# Patient Record
Sex: MALE | Race: WHITE | Employment: OTHER | URBAN - NONMETROPOLITAN AREA
[De-identification: names, ages, dates, MRNs, and addresses within clinical notes are randomized per-mention and may not be internally consistent; named-entity substitution may affect disease eponyms.]

---

## 2020-05-18 ENCOUNTER — OFFICE VISIT (OUTPATIENT)
Dept: FAMILY MEDICINE CLINIC | Age: 60
End: 2020-05-18
Payer: COMMERCIAL

## 2020-05-18 VITALS
HEIGHT: 69 IN | DIASTOLIC BLOOD PRESSURE: 64 MMHG | TEMPERATURE: 98 F | HEART RATE: 66 BPM | OXYGEN SATURATION: 98 % | WEIGHT: 238 LBS | RESPIRATION RATE: 18 BRPM | BODY MASS INDEX: 35.25 KG/M2 | SYSTOLIC BLOOD PRESSURE: 114 MMHG

## 2020-05-18 DIAGNOSIS — E61.1 IRON DEFICIENCY: ICD-10-CM

## 2020-05-18 DIAGNOSIS — K76.9 LIVER DISEASE: ICD-10-CM

## 2020-05-18 DIAGNOSIS — R00.2 PALPITATION: ICD-10-CM

## 2020-05-18 LAB
ALBUMIN SERPL-MCNC: 4.1 G/DL (ref 3.5–5.2)
ALP BLD-CCNC: 60 U/L (ref 40–130)
ALT SERPL-CCNC: 14 U/L (ref 5–41)
ANION GAP SERPL CALCULATED.3IONS-SCNC: 15 MMOL/L (ref 7–19)
APTT: 33.1 SEC (ref 26–36.2)
AST SERPL-CCNC: 21 U/L (ref 5–40)
BACTERIA: NEGATIVE /HPF
BILIRUB SERPL-MCNC: 1.3 MG/DL (ref 0.2–1.2)
BILIRUBIN URINE: NEGATIVE
BLOOD, URINE: NEGATIVE
BUN BLDV-MCNC: 10 MG/DL (ref 8–23)
CALCIUM SERPL-MCNC: 8.7 MG/DL (ref 8.8–10.2)
CHLORIDE BLD-SCNC: 103 MMOL/L (ref 98–111)
CHOLESTEROL, TOTAL: 149 MG/DL (ref 160–199)
CLARITY: CLEAR
CO2: 25 MMOL/L (ref 22–29)
COLOR: ABNORMAL
CREAT SERPL-MCNC: 0.9 MG/DL (ref 0.5–1.2)
EPITHELIAL CELLS, UA: 5 /HPF (ref 0–5)
FERRITIN: 70 NG/ML (ref 30–400)
FOLATE: >20 NG/ML (ref 4.5–32.2)
GFR NON-AFRICAN AMERICAN: >60
GLUCOSE BLD-MCNC: 94 MG/DL (ref 74–109)
GLUCOSE URINE: NEGATIVE MG/DL
HAV IGM SER IA-ACNC: NORMAL
HCT VFR BLD CALC: 41.8 % (ref 42–52)
HDLC SERPL-MCNC: 35 MG/DL (ref 55–121)
HEMOGLOBIN: 14.2 G/DL (ref 14–18)
HEPATITIS B CORE IGM ANTIBODY: NORMAL
HEPATITIS B SURFACE ANTIGEN INTERPRETATION: NORMAL
HEPATITIS C ANTIBODY INTERPRETATION: NORMAL
HYALINE CASTS: 5 /HPF (ref 0–8)
INR BLD: 1.32 (ref 0.88–1.18)
IRON SATURATION: 25 % (ref 14–50)
IRON: 78 UG/DL (ref 59–158)
KETONES, URINE: NEGATIVE MG/DL
LDL CHOLESTEROL CALCULATED: 101 MG/DL
LEUKOCYTE ESTERASE, URINE: ABNORMAL
MCH RBC QN AUTO: 33.2 PG (ref 27–31)
MCHC RBC AUTO-ENTMCNC: 34 G/DL (ref 33–37)
MCV RBC AUTO: 97.7 FL (ref 80–94)
NITRITE, URINE: NEGATIVE
PDW BLD-RTO: 13.8 % (ref 11.5–14.5)
PH UA: 7 (ref 5–8)
PLATELET # BLD: 79 K/UL (ref 130–400)
PMV BLD AUTO: 12 FL (ref 9.4–12.4)
POTASSIUM SERPL-SCNC: 4.4 MMOL/L (ref 3.5–5)
PROTEIN UA: NEGATIVE MG/DL
PROTHROMBIN TIME: 16.4 SEC (ref 12–14.6)
RBC # BLD: 4.28 M/UL (ref 4.7–6.1)
RBC UA: 1 /HPF (ref 0–4)
SODIUM BLD-SCNC: 143 MMOL/L (ref 136–145)
SPECIFIC GRAVITY UA: 1.02 (ref 1–1.03)
TOTAL IRON BINDING CAPACITY: 314 UG/DL (ref 250–400)
TOTAL PROTEIN: 6.9 G/DL (ref 6.6–8.7)
TRIGL SERPL-MCNC: 67 MG/DL (ref 0–149)
TSH SERPL DL<=0.05 MIU/L-ACNC: 2.53 UIU/ML (ref 0.27–4.2)
UROBILINOGEN, URINE: 2 E.U./DL
VITAMIN B-12: 943 PG/ML (ref 211–946)
VITAMIN D 25-HYDROXY: 40.4 NG/ML
WBC # BLD: 6.1 K/UL (ref 4.8–10.8)
WBC UA: 2 /HPF (ref 0–5)

## 2020-05-18 PROCEDURE — 99204 OFFICE O/P NEW MOD 45 MIN: CPT | Performed by: FAMILY MEDICINE

## 2020-05-18 RX ORDER — UMECLIDINIUM 62.5 UG/1
1 AEROSOL, POWDER ORAL DAILY
Status: ON HOLD | COMMUNITY
End: 2020-10-23

## 2020-05-18 RX ORDER — OXYCODONE AND ACETAMINOPHEN 10; 325 MG/1; MG/1
1 TABLET ORAL EVERY 6 HOURS PRN
COMMUNITY
End: 2020-09-23 | Stop reason: SDUPTHER

## 2020-05-18 RX ORDER — MULTIVIT WITH MINERALS/LUTEIN
1000 TABLET ORAL DAILY
COMMUNITY
End: 2021-04-29 | Stop reason: ALTCHOICE

## 2020-05-18 RX ORDER — CYANOCOBALAMIN (VITAMIN B-12) 500 MCG
450 LOZENGE ORAL DAILY
COMMUNITY
End: 2021-04-29 | Stop reason: ALTCHOICE

## 2020-05-18 RX ORDER — PNV NO.95/FERROUS FUM/FOLIC AC 28MG-0.8MG
325 TABLET ORAL DAILY
Qty: 30 TABLET | Refills: 5 | Status: SHIPPED | OUTPATIENT
Start: 2020-05-18 | End: 2021-04-29 | Stop reason: ALTCHOICE

## 2020-05-18 RX ORDER — FUROSEMIDE 20 MG/1
20 TABLET ORAL DAILY
COMMUNITY
End: 2021-02-23 | Stop reason: SDUPTHER

## 2020-05-18 RX ORDER — SPIRONOLACTONE 50 MG/1
50 TABLET, FILM COATED ORAL DAILY
COMMUNITY
End: 2021-03-16 | Stop reason: SDUPTHER

## 2020-05-18 RX ORDER — FOLIC ACID 1 MG/1
1 TABLET ORAL DAILY
COMMUNITY
End: 2020-09-16 | Stop reason: SDUPTHER

## 2020-05-18 RX ORDER — OMEPRAZOLE 40 MG/1
40 CAPSULE, DELAYED RELEASE ORAL DAILY
COMMUNITY
End: 2021-03-24 | Stop reason: SDUPTHER

## 2020-05-18 RX ORDER — VERAPAMIL HYDROCHLORIDE 120 MG/1
120 CAPSULE, EXTENDED RELEASE ORAL 2 TIMES DAILY
COMMUNITY
End: 2020-12-08 | Stop reason: ALTCHOICE

## 2020-05-18 ASSESSMENT — ENCOUNTER SYMPTOMS
EYES NEGATIVE: 1
ALLERGIC/IMMUNOLOGIC NEGATIVE: 1
RESPIRATORY NEGATIVE: 1
GASTROINTESTINAL NEGATIVE: 1
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
## 2020-05-18 NOTE — PATIENT INSTRUCTIONS
Patient Education        Liver Disease Diet: Care Instructions  Your Care Instructions    The liver does many jobs that are vital to the rest of your body. When something is wrong with the liver, your body may not get the nutrition it needs. It is important that you eat a healthy diet that includes a variety of foods from the basic food groups: grains, vegetables, fruits, dairy, and protein foods. Follow your doctor's instructions for eating carbohydrate, protein, and fat in the right amounts for you. Your doctor also may limit salt or take salt out of your diet to help protect the liver. Always talk with your doctor or dietitian before you make changes in your diet. Follow-up care is a key part of your treatment and safety. Be sure to make and go to all appointments, and call your doctor if you are having problems. It's also a good idea to know your test results and keep a list of the medicines you take. How can you care for yourself at home? · Work with your doctor or dietitian to create a food plan that guides your daily food choices. · Eat a balanced diet. Do not skip meals or go for many hours without eating. If you eat several small meals during the day, you have a better chance of getting the extra calories your body needs for energy. · Your doctor may recommend a high-carbohydrate diet to get enough calories, since you may have to limit fat. You may need to spread carbohydrate throughout your meals and snacks to control the amount of sugar in your blood. Eat six small meals a day, rather than three big ones. Carbohydrate is found in:  ? Whole-grain and refined breads and cereals. ? Some vegetables. ? Cooked beans (such as kidney or black beans) and peas (such as lentils or split peas). ? Fruits. ? Low-fat or nonfat milk and milk products, which also supply protein. ? Candy, table sugar, and sugary soda drinks (try to limit these).   · Follow your doctor's or dietitian's instructions on how to get the right amount of protein in your diet. Examples of animal protein are:  ? Meat, fish, and poultry. ? Eggs. ? Milk and milk products. · Your doctor or dietitian may ask you to eat a certain amount of protein that comes from plants (rather than protein that comes from animals). You can get plant protein from foods such as:  ? Cooked dried beans and peas. ? Peanut butter, nuts, and seeds. ? Tofu. · Limit salt, if your doctor tells you to. This will help prevent fluid buildup in your belly and chest, which can cause serious problems. Salt is in many prepared foods, such as gonzalez, canned foods, snack foods, sauces, and soups. Look for reduced-salt products. · Your doctor may recommend vitamin and mineral supplements. However, do not take any other medicine, including over-the-counter medicines, vitamins, and herbal products, without talking to your doctor first.  · Do NOT take acetaminophen (Tylenol), ibuprofen (Advil, Motrin), or naproxen (Aleve). These can cause more liver damage. · Do not drink any alcohol. It can harm your liver. Talk to your doctor if you need help to stop drinking. · If you have a loss of appetite or have nausea or vomiting, try to:  ? Stay away from foods and food smells that make you feel worse. ? Avoid greasy or fatty foods. ? Eat food that settles your stomach when it feels upset. Try crackers, dry toast, or ivan (ivan tea, hard ivan candy, or crystallized ivan). When should you call for help? Watch closely for changes in your health, and be sure to contact your doctor if you have any problems. Where can you learn more? Go to https://Well[employer] Holdingspepiceweb.[employer]. org and sign in to your [employer] account. Enter X359 in the Pinoccio box to learn more about \"Liver Disease Diet: Care Instructions. \"     If you do not have an account, please click on the \"Sign Up Now\" link.   Current as of: August 21, 2019Content Version: 12.4  © 6110-6939 Healthwise,

## 2020-05-19 ENCOUNTER — TELEPHONE (OUTPATIENT)
Dept: NEUROLOGY | Age: 60
End: 2020-05-19

## 2020-05-21 ENCOUNTER — OFFICE VISIT (OUTPATIENT)
Dept: FAMILY MEDICINE CLINIC | Age: 60
End: 2020-05-21
Payer: COMMERCIAL

## 2020-05-21 VITALS
WEIGHT: 233 LBS | OXYGEN SATURATION: 98 % | TEMPERATURE: 99 F | HEIGHT: 69 IN | BODY MASS INDEX: 34.51 KG/M2 | HEART RATE: 82 BPM | DIASTOLIC BLOOD PRESSURE: 78 MMHG | RESPIRATION RATE: 18 BRPM | SYSTOLIC BLOOD PRESSURE: 142 MMHG

## 2020-05-21 PROCEDURE — 99214 OFFICE O/P EST MOD 30 MIN: CPT | Performed by: FAMILY MEDICINE

## 2020-05-21 ASSESSMENT — ENCOUNTER SYMPTOMS
RESPIRATORY NEGATIVE: 1
EYES NEGATIVE: 1
NAUSEA: 1
ALLERGIC/IMMUNOLOGIC NEGATIVE: 1

## 2020-05-27 ENCOUNTER — TELEPHONE (OUTPATIENT)
Dept: NEUROLOGY | Age: 60
End: 2020-05-27

## 2020-05-27 NOTE — TELEPHONE ENCOUNTER
Received a referral from Dr. Paras Del Real office for this patient. Called and spoke with patient to let him know when I have him scheduled an appointment with Dr. Yolanda Baldwin. Patient is aware of the appointment time/date/location.

## 2020-05-29 ENCOUNTER — HOSPITAL ENCOUNTER (OUTPATIENT)
Dept: MRI IMAGING | Age: 60
Discharge: HOME | End: 2020-05-29
Payer: MEDICARE

## 2020-05-29 ENCOUNTER — HOSPITAL ENCOUNTER (OUTPATIENT)
Dept: ULTRASOUND IMAGING | Age: 60
Discharge: HOME | End: 2020-05-29
Payer: MEDICARE

## 2020-05-29 PROCEDURE — 6360000004 HC RX CONTRAST MEDICATION: Performed by: FAMILY MEDICINE

## 2020-05-29 PROCEDURE — 76705 ECHO EXAM OF ABDOMEN: CPT

## 2020-05-29 PROCEDURE — 70553 MRI BRAIN STEM W/O & W/DYE: CPT

## 2020-05-29 PROCEDURE — A9577 INJ MULTIHANCE: HCPCS | Performed by: FAMILY MEDICINE

## 2020-05-29 PROCEDURE — 76700 US EXAM ABDOM COMPLETE: CPT

## 2020-05-29 RX ADMIN — GADOBENATE DIMEGLUMINE 20 ML: 529 INJECTION, SOLUTION INTRAVENOUS at 18:56

## 2020-06-01 ENCOUNTER — CARE COORDINATION (OUTPATIENT)
Dept: CARE COORDINATION | Age: 60
End: 2020-06-01

## 2020-06-01 ENCOUNTER — VIRTUAL VISIT (OUTPATIENT)
Dept: FAMILY MEDICINE CLINIC | Age: 60
End: 2020-06-01
Payer: MEDICARE

## 2020-06-01 PROCEDURE — 99212 OFFICE O/P EST SF 10 MIN: CPT | Performed by: FAMILY MEDICINE

## 2020-06-04 ENCOUNTER — TELEPHONE (OUTPATIENT)
Dept: ADMINISTRATIVE | Age: 60
End: 2020-06-04

## 2020-06-15 RX ORDER — TRAZODONE HYDROCHLORIDE 100 MG/1
100 TABLET ORAL NIGHTLY
Qty: 30 TABLET | Refills: 5 | Status: SHIPPED | OUTPATIENT
Start: 2020-06-15 | End: 2020-09-23 | Stop reason: SDUPTHER

## 2020-06-25 ENCOUNTER — NURSE TRIAGE (OUTPATIENT)
Dept: OTHER | Facility: CLINIC | Age: 60
End: 2020-06-25

## 2020-06-25 ENCOUNTER — TELEPHONE (OUTPATIENT)
Dept: FAMILY MEDICINE CLINIC | Age: 60
End: 2020-06-25

## 2020-06-25 NOTE — TELEPHONE ENCOUNTER
Patient called [facility] pre-service center [facility]) to schedule appointment, with red flag complaint, transferred to RN access for triage. Reports having dizziness for \"past few days\". States present with change in position. Denies any current dizziness at the time of the call. Patient informed of disposition. Care advice as documented. Instructed patient to call back with worsening symptoms. Soft transfer to pre-service center to schedule appointment as recommended. Please do not respond to the triage nurse through this encounter. Any subsequent communication should be directly with the patient.     Ozzie    Reason for Disposition   [1] MODERATE dizziness (e.g., interferes with normal activities) AND [2] has NOT been evaluated by physician for this  (Exception: dizziness caused by heat exposure, sudden standing, or poor fluid intake)    Protocols used: [facility]

## 2020-06-26 ENCOUNTER — OFFICE VISIT (OUTPATIENT)
Dept: FAMILY MEDICINE CLINIC | Age: 60
End: 2020-06-26
Payer: MEDICARE

## 2020-06-26 VITALS
HEART RATE: 63 BPM | TEMPERATURE: 98 F | OXYGEN SATURATION: 98 % | DIASTOLIC BLOOD PRESSURE: 80 MMHG | SYSTOLIC BLOOD PRESSURE: 130 MMHG | WEIGHT: 234 LBS | BODY MASS INDEX: 34.56 KG/M2

## 2020-06-26 DIAGNOSIS — I95.2 HYPOTENSION DUE TO DRUGS: ICD-10-CM

## 2020-06-26 LAB
ALBUMIN SERPL-MCNC: 4.3 G/DL (ref 3.5–5.2)
ALP BLD-CCNC: 63 U/L (ref 40–130)
ALT SERPL-CCNC: 28 U/L (ref 5–41)
ANION GAP SERPL CALCULATED.3IONS-SCNC: 14 MMOL/L (ref 7–19)
AST SERPL-CCNC: 27 U/L (ref 5–40)
BILIRUB SERPL-MCNC: 1.8 MG/DL (ref 0.2–1.2)
BUN BLDV-MCNC: 18 MG/DL (ref 8–23)
CALCIUM SERPL-MCNC: 10.1 MG/DL (ref 8.8–10.2)
CHLORIDE BLD-SCNC: 99 MMOL/L (ref 98–111)
CO2: 23 MMOL/L (ref 22–29)
CREAT SERPL-MCNC: 1 MG/DL (ref 0.5–1.2)
GFR NON-AFRICAN AMERICAN: >60
GLUCOSE BLD-MCNC: 79 MG/DL (ref 74–109)
HCT VFR BLD CALC: 45.4 % (ref 42–52)
HEMOGLOBIN: 15.8 G/DL (ref 14–18)
MCH RBC QN AUTO: 32.8 PG (ref 27–31)
MCHC RBC AUTO-ENTMCNC: 34.8 G/DL (ref 33–37)
MCV RBC AUTO: 94.4 FL (ref 80–94)
PDW BLD-RTO: 12.9 % (ref 11.5–14.5)
PLATELET # BLD: 90 K/UL (ref 130–400)
PMV BLD AUTO: 12.9 FL (ref 9.4–12.4)
POTASSIUM SERPL-SCNC: 4.5 MMOL/L (ref 3.5–5)
RBC # BLD: 4.81 M/UL (ref 4.7–6.1)
SODIUM BLD-SCNC: 136 MMOL/L (ref 136–145)
TOTAL PROTEIN: 6.9 G/DL (ref 6.6–8.7)
WBC # BLD: 10.6 K/UL (ref 4.8–10.8)

## 2020-06-26 PROCEDURE — 99213 OFFICE O/P EST LOW 20 MIN: CPT | Performed by: FAMILY MEDICINE

## 2020-06-26 PROCEDURE — 93000 ELECTROCARDIOGRAM COMPLETE: CPT | Performed by: FAMILY MEDICINE

## 2020-06-26 PROCEDURE — 1036F TOBACCO NON-USER: CPT | Performed by: FAMILY MEDICINE

## 2020-06-26 PROCEDURE — 3017F COLORECTAL CA SCREEN DOC REV: CPT | Performed by: FAMILY MEDICINE

## 2020-06-26 PROCEDURE — G8427 DOCREV CUR MEDS BY ELIG CLIN: HCPCS | Performed by: FAMILY MEDICINE

## 2020-06-26 PROCEDURE — G8417 CALC BMI ABV UP PARAM F/U: HCPCS | Performed by: FAMILY MEDICINE

## 2020-06-26 ASSESSMENT — ENCOUNTER SYMPTOMS
ALLERGIC/IMMUNOLOGIC NEGATIVE: 1
RESPIRATORY NEGATIVE: 1
EYES NEGATIVE: 1
GASTROINTESTINAL NEGATIVE: 1

## 2020-06-26 NOTE — PROGRESS NOTES
Wt 234 lb (106.1 kg)   SpO2 98%   BMI 34.56 kg/m²      ASSESSMENT:     Diagnosis Orders   1. Hypotension due to drugs  Orthostatic blood pressure and pulse    CBC    Comprehensive Metabolic Panel    EKG 12 Lead        PLAN:    1. EKG shows normal sinus rhythm without evidence of acute ischemia. Recommend a Holter monitor but patient refused secondary to cost.  He said that he cannot afford more testing. Blood work. Orthostatic blood pressure. Encouraged fluid intake. May consider decreasing dose of spironolactone.   Stand up slowly and do not walk until feels back to normal.  Follow-up next scheduled visit

## 2020-06-29 ENCOUNTER — TELEPHONE (OUTPATIENT)
Dept: PRIMARY CARE CLINIC | Age: 60
End: 2020-06-29

## 2020-07-09 ENCOUNTER — TELEPHONE (OUTPATIENT)
Dept: FAMILY MEDICINE CLINIC | Age: 60
End: 2020-07-09

## 2020-07-09 NOTE — TELEPHONE ENCOUNTER
Pt has been going to Dr. Montse Franklin for [facility] for years. He is wanting to go to Pain Management at [facility]. Asking for a referral to them.

## 2020-07-10 ENCOUNTER — TELEPHONE (OUTPATIENT)
Dept: FAMILY MEDICINE CLINIC | Age: 60
End: 2020-07-10

## 2020-07-10 RX ORDER — TIZANIDINE 4 MG/1
TABLET ORAL
Qty: 150 TABLET | Refills: 1 | Status: SHIPPED | OUTPATIENT
Start: 2020-07-10 | End: 2020-09-16

## 2020-07-10 NOTE — TELEPHONE ENCOUNTER
Brendan Fisher is calling for a medication refill that is not listed in his chart. Last office visit: 6/26/2020  Next office visit: Visit date not found   Preferred Pharmacy: [facility]    Please call patient to clarify. Thank You.     *Patient called and asked if he could get his pain medication since he is leaving [facility] on 07/27/20 and doesn't have an appt with pain mgmt until September. Please call patient. Thanks.

## 2020-07-20 ENCOUNTER — OFFICE VISIT (OUTPATIENT)
Dept: FAMILY MEDICINE CLINIC | Age: 60
End: 2020-07-20
Payer: MEDICARE

## 2020-07-20 VITALS
OXYGEN SATURATION: 98 % | DIASTOLIC BLOOD PRESSURE: 74 MMHG | SYSTOLIC BLOOD PRESSURE: 126 MMHG | WEIGHT: 237 LBS | HEART RATE: 72 BPM | TEMPERATURE: 99 F | BODY MASS INDEX: 35 KG/M2

## 2020-07-20 PROCEDURE — 3017F COLORECTAL CA SCREEN DOC REV: CPT | Performed by: FAMILY MEDICINE

## 2020-07-20 PROCEDURE — 1036F TOBACCO NON-USER: CPT | Performed by: FAMILY MEDICINE

## 2020-07-20 PROCEDURE — G8427 DOCREV CUR MEDS BY ELIG CLIN: HCPCS | Performed by: FAMILY MEDICINE

## 2020-07-20 PROCEDURE — 99213 OFFICE O/P EST LOW 20 MIN: CPT | Performed by: FAMILY MEDICINE

## 2020-07-20 PROCEDURE — G8417 CALC BMI ABV UP PARAM F/U: HCPCS | Performed by: FAMILY MEDICINE

## 2020-07-20 RX ORDER — TRAZODONE HYDROCHLORIDE 100 MG/1
200 TABLET ORAL NIGHTLY
Qty: 60 TABLET | Refills: 5 | Status: SHIPPED | OUTPATIENT
Start: 2020-07-20 | End: 2020-08-12 | Stop reason: SDUPTHER

## 2020-07-20 ASSESSMENT — ENCOUNTER SYMPTOMS
ALLERGIC/IMMUNOLOGIC NEGATIVE: 1
EYES NEGATIVE: 1
GASTROINTESTINAL NEGATIVE: 1
RESPIRATORY NEGATIVE: 1

## 2020-07-20 NOTE — PROGRESS NOTES
SUBJECTIVE:    Patient ID: Jesus Reyna is a 56 y.o.male. HPI:   Patient here for follow-up of multiple medical problem  Patient have insomnia. He was taking trazodone 200 mg at bedtime with good results. His prescription was changed by mistake to 100 mg.  100 mg does not help. 200 was helping perfectly. Denies medication side effect. He also have chronic low back pain and sees pain management. Apparently he is a [place] at a time July 27 and his prescription was 3 dated to July 27 but he is cannot be leaving before the pharmacy open. He want me to prescribe his medications. Past Medical History:   Diagnosis Date    Chronic back pain     [facility] ([facility] [address]     COPD (chronic obstructive pulmonary disease) (HCC)     GERD (gastroesophageal reflux disease)     Liver disease       Current Outpatient Medications   Medication Sig Dispense Refill    traZODone (DESYREL) 100 MG tablet Take 2 tablets by mouth nightly 60 tablet 5    tiZANidine (ZANAFLEX) 4 MG tablet 1 - 2 pills every 8 hours prn 150 tablet 1    traZODone (DESYREL) 100 MG tablet Take 1 tablet by mouth nightly 30 tablet 5    linaCLOtide (LINZESS) 72 MCG CAPS capsule Take 1 capsule by mouth every morning (before breakfast) Indications: gets samples 30 capsule 5    diclofenac (VOLTAREN) 50 MG EC tablet Take 50 mg by mouth 2 times daily      folic acid (FOLVITE) 1 MG tablet Take 1 mg by mouth daily      furosemide (LASIX) 20 MG tablet Take 20 mg by mouth daily      Umeclidinium Bromide (INCRUSE ELLIPTA) 62.5 MCG/INH AEPB Inhale 1 puff into the lungs daily      omeprazole (PRILOSEC) 40 MG delayed release capsule Take 40 mg by mouth daily      oxyCODONE-acetaminophen (PERCOCET)  MG per tablet Take 1 tablet by mouth every 6 hours as needed for Pain.  Indications: Dr. Stanislav Pike at Pain Management       spironolactone (ALDACTONE) 50 MG tablet Take 50 mg by mouth daily      verapamil (VERELAN) 120 MG extended release capsule Take conjunctiva is not injected. Pupils: Pupils are equal, round, and reactive to light. Neck:      Musculoskeletal: Normal range of motion and neck supple. Thyroid: No thyromegaly. Vascular: No carotid bruit or JVD. Cardiovascular:      Rate and Rhythm: Normal rate and regular rhythm. Pulses:           Carotid pulses are 2+ on the right side and 2+ on the left side. Radial pulses are 2+ on the right side and 2+ on the left side. Heart sounds: Normal heart sounds, S1 normal and S2 normal. No murmur. Pulmonary:      Effort: Pulmonary effort is normal. No accessory muscle usage. Breath sounds: Normal breath sounds. Abdominal:      General: Bowel sounds are normal. There is no distension or abdominal bruit. Palpations: Abdomen is soft. There is no mass. Tenderness: There is no abdominal tenderness. Hernia: No hernia is present. Musculoskeletal: Normal range of motion. Right lower leg: No edema. Left lower leg: No edema. Lymphadenopathy:      Cervical:      Right cervical: No superficial cervical adenopathy. Left cervical: No superficial cervical adenopathy. Skin:     General: Skin is warm and dry. Coloration: Skin is not jaundiced or pale. Findings: No lesion or rash. Nails: There is no clubbing. Neurological:      Mental Status: He is alert and oriented to person, place, and time. Cranial Nerves: No facial asymmetry. Motor: No weakness or tremor. Coordination: Coordination normal.      Gait: Gait normal.      Deep Tendon Reflexes: Reflexes are normal and symmetric. Psychiatric:         Attention and Perception: Attention normal.         Mood and Affect: Mood normal.         Speech: Speech normal.         Behavior: Behavior normal.         Thought Content:  Thought content normal.         Cognition and Memory: Memory normal.         Judgment: Judgment normal.        /74   Pulse 72   Temp 99 °F (37.2 °C) Wt 237 lb (107.5 kg)   SpO2 98%   BMI 35.00 kg/m²      ASSESSMENT:     Diagnosis Orders   1. Insomnia, unspecified type- not controlled traZODone (DESYREL) 100 MG tablet        PLAN:    1. Increase trazodone to 200 mg. Will need to contact the orthopedic [facility] to see how we can help him. Follow-up 4 weeks.

## 2020-08-10 ENCOUNTER — HOSPITAL ENCOUNTER (OUTPATIENT)
Dept: GENERAL RADIOLOGY | Age: 60
Discharge: HOME | End: 2020-08-10
Payer: MEDICARE

## 2020-08-10 ENCOUNTER — OFFICE VISIT (OUTPATIENT)
Dept: FAMILY MEDICINE CLINIC | Age: 60
End: 2020-08-10
Payer: MEDICARE

## 2020-08-10 VITALS
RESPIRATION RATE: 20 BRPM | DIASTOLIC BLOOD PRESSURE: 60 MMHG | SYSTOLIC BLOOD PRESSURE: 112 MMHG | OXYGEN SATURATION: 98 % | BODY MASS INDEX: 34.96 KG/M2 | TEMPERATURE: 97.9 F | HEIGHT: 69 IN | WEIGHT: 236 LBS | HEART RATE: 75 BPM

## 2020-08-10 PROCEDURE — 99213 OFFICE O/P EST LOW 20 MIN: CPT | Performed by: FAMILY MEDICINE

## 2020-08-10 PROCEDURE — G8417 CALC BMI ABV UP PARAM F/U: HCPCS | Performed by: FAMILY MEDICINE

## 2020-08-10 PROCEDURE — 72040 X-RAY EXAM NECK SPINE 2-3 VW: CPT

## 2020-08-10 PROCEDURE — 3017F COLORECTAL CA SCREEN DOC REV: CPT | Performed by: FAMILY MEDICINE

## 2020-08-10 PROCEDURE — G8427 DOCREV CUR MEDS BY ELIG CLIN: HCPCS | Performed by: FAMILY MEDICINE

## 2020-08-10 PROCEDURE — 1036F TOBACCO NON-USER: CPT | Performed by: FAMILY MEDICINE

## 2020-08-11 ENCOUNTER — NURSE TRIAGE (OUTPATIENT)
Dept: OTHER | Facility: CLINIC | Age: 60
End: 2020-08-11

## 2020-08-11 ENCOUNTER — TELEPHONE (OUTPATIENT)
Dept: FAMILY MEDICINE CLINIC | Age: 60
End: 2020-08-11

## 2020-08-11 NOTE — TELEPHONE ENCOUNTER
Reason for Disposition   [1] Caller requests to speak ONLY to PCP AND [2] URGENT question    Protocols used: PCP CALL - NO TRIAGE-ADULT-    Spoke to Sivan at the office who states that [facility] will call patient back by the end of the day today.

## 2020-08-11 NOTE — TELEPHONE ENCOUNTER
Adrian Parker would like a call to discuss his Labs results.  his preferred call back time is  Anytime

## 2020-08-12 RX ORDER — TRAZODONE HYDROCHLORIDE 100 MG/1
200 TABLET ORAL NIGHTLY
Qty: 180 TABLET | Refills: 1 | Status: SHIPPED | OUTPATIENT
Start: 2020-08-12 | End: 2021-03-08

## 2020-08-16 ASSESSMENT — ENCOUNTER SYMPTOMS
CONSTIPATION: 0
COUGH: 0
BACK PAIN: 0
RHINORRHEA: 0
SHORTNESS OF BREATH: 0
NAUSEA: 0
EYE PAIN: 0
EYE DISCHARGE: 0
ABDOMINAL PAIN: 0
DIARRHEA: 0
VOMITING: 0

## 2020-08-16 NOTE — PATIENT INSTRUCTIONS
Patient Education        Neck: Exercises  Introduction  Here are some examples of exercises for you to try. The exercises may be suggested for a condition or for rehabilitation. Start each exercise slowly. Ease off the exercises if you start to have pain. You will be told when to start these exercises and which ones will work best for you. How to do the exercises  Neck stretch   1. This stretch works best if you keep your shoulder down as you lean away from it. To help you remember to do this, start by relaxing your shoulders and lightly holding on to your thighs or your chair. 2. Tilt your head toward your shoulder and hold for 15 to 30 seconds. Let the weight of your head stretch your muscles. 3. If you would like a little added stretch, use your hand to gently and steadily pull your head toward your shoulder. For example, keeping your right shoulder down, lean your head to the left. 4. Repeat 2 to 4 times toward each shoulder. Diagonal neck stretch   1. Turn your head slightly toward the direction you will be stretching, and tilt your head diagonally toward your chest and hold for 15 to 30 seconds. 2. If you would like a little added stretch, use your hand to gently and steadily pull your head forward on the diagonal.  3. Repeat 2 to 4 times toward each side. Dorsal glide stretch   The dorsal glide stretches the back of the neck. If you feel pain, do not glide so far back. Some people find this exercise easier to do while lying on their backs with an ice pack on the neck. 1. Sit or stand tall and look straight ahead. 2. Slowly tuck your chin as you glide your head backward over your body  3. Hold for a count of 6, and then relax for up to 10 seconds. 4. Repeat 8 to 12 times. Chest and shoulder stretch   1. Sit or stand tall and glide your head backward as in the dorsal glide stretch. 2. Raise both arms so that your hands are next to your ears.   3. Take a deep breath, and as you breathe out, lower your elbows down and behind your back. You will feel your shoulder blades slide down and together, and at the same time you will feel a stretch across your chest and the front of your shoulders. 4. Hold for about 6 seconds, and then relax for up to 10 seconds. 5. Repeat 8 to 12 times. Strengthening: Hands on head   1. Move your head backward, forward, and side to side against gentle pressure from your hands, holding each position for about 6 seconds. 2. Repeat 8 to 12 times. Follow-up care is a key part of your treatment and safety. Be sure to make and go to all appointments, and call your doctor if you are having problems. It's also a good idea to know your test results and keep a list of the medicines you take. Where can you learn more? Go to https://[employer] AppliancepeminiMaritime provinceseb.[employer]. org and sign in to your HeyKiki account. Enter P975 in the SCADA Access box to learn more about \"Neck: Exercises. \"     If you do not have an account, please click on the \"Sign Up Now\" link. Current as of: March 2, 2020               Content Version: 12.5  © 6855-7450 Healthwise, Incorporated. Care instructions adapted under license by [facility] ([facility]). If you have questions about a medical condition or this instruction, always ask your healthcare professional. [address] any warranty or liability for your use of this information.

## 2020-08-18 ENCOUNTER — OFFICE VISIT (OUTPATIENT)
Dept: FAMILY MEDICINE CLINIC | Age: 60
End: 2020-08-18
Payer: MEDICARE

## 2020-08-18 VITALS
TEMPERATURE: 97.3 F | SYSTOLIC BLOOD PRESSURE: 120 MMHG | HEART RATE: 74 BPM | DIASTOLIC BLOOD PRESSURE: 78 MMHG | OXYGEN SATURATION: 98 % | BODY MASS INDEX: 33.97 KG/M2 | WEIGHT: 230 LBS

## 2020-08-18 PROCEDURE — 3017F COLORECTAL CA SCREEN DOC REV: CPT | Performed by: FAMILY MEDICINE

## 2020-08-18 PROCEDURE — G8417 CALC BMI ABV UP PARAM F/U: HCPCS | Performed by: FAMILY MEDICINE

## 2020-08-18 PROCEDURE — 1036F TOBACCO NON-USER: CPT | Performed by: FAMILY MEDICINE

## 2020-08-18 PROCEDURE — 99213 OFFICE O/P EST LOW 20 MIN: CPT | Performed by: FAMILY MEDICINE

## 2020-08-18 PROCEDURE — G8427 DOCREV CUR MEDS BY ELIG CLIN: HCPCS | Performed by: FAMILY MEDICINE

## 2020-08-18 ASSESSMENT — ENCOUNTER SYMPTOMS
ALLERGIC/IMMUNOLOGIC NEGATIVE: 1
EYES NEGATIVE: 1
RESPIRATORY NEGATIVE: 1
GASTROINTESTINAL NEGATIVE: 1

## 2020-08-18 NOTE — PROGRESS NOTES
SUBJECTIVE:    Patient ID: Nidia Garcia is a 56 y.o.male. HPI:   Patient here for evaluation of neck pain. Patient complains of neck pain that radiates to his left shoulder blade and left arm. Cause numbness on the fourth and fifth digit. Denies any weakness in the left arm. He have x-rays that show some osteoarthritis. Some muscle spasms. He sees pain management. He does not want any surgical interventions. Pain worse with activity. Pain medication do not really help much the pain. This been going on for a while now. More than 2 months. Past Medical History:   Diagnosis Date    Chronic back pain     Cirrhosis ([facility] [address]     COPD (chronic obstructive pulmonary disease) (HCC)     GERD (gastroesophageal reflux disease)     Liver disease       Current Outpatient Medications   Medication Sig Dispense Refill    traZODone (DESYREL) 100 MG tablet Take 2 tablets by mouth nightly 180 tablet 1    tiZANidine (ZANAFLEX) 4 MG tablet 1 - 2 pills every 8 hours prn 150 tablet 1    traZODone (DESYREL) 100 MG tablet Take 1 tablet by mouth nightly 30 tablet 5    linaCLOtide (LINZESS) 72 MCG CAPS capsule Take 1 capsule by mouth every morning (before breakfast) Indications: gets samples 30 capsule 5    diclofenac (VOLTAREN) 50 MG EC tablet Take 50 mg by mouth 2 times daily      folic acid (FOLVITE) 1 MG tablet Take 1 mg by mouth daily      furosemide (LASIX) 20 MG tablet Take 20 mg by mouth daily      Umeclidinium Bromide (INCRUSE ELLIPTA) 62.5 MCG/INH AEPB Inhale 1 puff into the lungs daily      omeprazole (PRILOSEC) 40 MG delayed release capsule Take 40 mg by mouth daily      oxyCODONE-acetaminophen (PERCOCET)  MG per tablet Take 1 tablet by mouth every 6 hours as needed for Pain.  Indications: Dr. Nisreen Reid at [facility]       spironolactone (ALDACTONE) 50 MG tablet Take 50 mg by mouth daily      verapamil (VERELAN) 120 MG extended release capsule Take 120 mg by mouth 2 times daily     

## 2020-08-25 ENCOUNTER — HOSPITAL ENCOUNTER (OUTPATIENT)
Dept: PAIN MANAGEMENT | Age: 60
Discharge: HOME | End: 2020-08-25
Payer: MEDICARE

## 2020-08-25 ENCOUNTER — TELEPHONE (OUTPATIENT)
Dept: PRIMARY CARE CLINIC | Age: 60
End: 2020-08-25

## 2020-08-25 NOTE — TELEPHONE ENCOUNTER
Pt was told when he started coming here that he would not get pain medication from Dr. Suzy Orozco. Pt was well aware of this. He had a misunderstanding with Dr. Rick Davis and wanted to be referred to [facility]. He was told by Dr. Suzy Orozco that he should stay with Dr. Rick Davis and he refused. He had an appointment with [facility] today and evidently they did not have what they needed and did not see him today. They re-scheduled him for October 28th and now he will be out of pain medication tomorrow. I explained that I would call both Dr. Rick Davis and [facility] to see if I could help with a rx refill, but Dr. Suzy Orozco would not write it. I told him I would call him as soon as I knew anything. Pt agreed to wait for my call.

## 2020-08-25 NOTE — PROGRESS NOTES
Nursing Admission Record    Current Issues / Falls / ER Visits:  No        Opioids Prescribed: percocet        Hx of any Neck/Back Surgeries? Is Patient currently taking a blood thinner? MRI exams received in the past 2 years:  Yes       When 5/21/2020                                              Where       Imaging on chart: Yes         Imaging records requested: Yes    CT exam received during the last 12 months: No       When                                               Where       Imaging on chart: No         Imaging records requested: No    X-ray exam received during the last 12 months: Yes       When 8/10/2020                                              Where       Imaging on chart: Yes         Imaging records requested: Yes    Nerve Conduction Study/EMG:  No       When                                          Where       Imaging on chart: No         Imaging records requested: No    Physical therapy: active order       When:                       Where     [facility]       When:                        Where     Labs  Yes       When: 6/26/2020                                             Where     PEG Score:     Last PEG Score: n/a    Annual ORT Score:      Annual PHQ Score:     Last UDS Results: n/a    Education Provided:  [x] Review of Mode  [] Agreement Review  [x] PEG Score Calculated [] PHQ Score Calculated [] ORT Score Calculated    [] Compliance Issues Discussed [] Cognitive Behavior Needs [x] Exercise [] Review of Test [] Financial Issues  [x] Tobacco/Alcohol Use Reviewed [x] Teaching [] New Patient [] Picture Obtained    Physician Plan:  [] Outgoing Referral  [] Pharmacy Consult  [] Test Ordered [] Prescription Ordered/Changed [] Blood Thinner Request Form  [] Obtained Test Results / Consult Notes  [] UDS due at next visit, verified per EPIC      [] Suspected Physical Abuse or Suicide Risk assessed - IF YES COMPLETE QUESTIONS BELOW    If any of the following questions are answered yes - contact attending physician for referral:    Has been considering harming self to escape stress, pain problems? [] YES  [] NO  Has a suicide plan? [] YES  [] NO  Has attempted suicide in the past?   [] YES  [] NO  Has a close friend or family member who committed suicide?   [] YES  [] NO    Assessment Completed by:  Electronically signed by Angel Infante RN on 8/25/2020 at 1:02 PM

## 2020-08-31 ENCOUNTER — VIRTUAL VISIT (OUTPATIENT)
Dept: FAMILY MEDICINE CLINIC | Age: 60
End: 2020-08-31
Payer: MEDICARE

## 2020-08-31 PROCEDURE — 99442 PR PHYS/QHP TELEPHONE EVALUATION 11-20 MIN: CPT | Performed by: FAMILY MEDICINE

## 2020-08-31 RX ORDER — OXYCODONE AND ACETAMINOPHEN 10; 325 MG/1; MG/1
1 TABLET ORAL EVERY 6 HOURS PRN
Qty: 60 TABLET | Refills: 0 | Status: SHIPPED | OUTPATIENT
Start: 2020-08-31 | End: 2020-09-16 | Stop reason: SDUPTHER

## 2020-08-31 NOTE — PROGRESS NOTES
Buddy Valdes is a 56 y.o. male evaluated via telephone on 8/31/2020. Consent:  He and/or health care decision maker is aware that that he may receive a bill for this telephone service, depending on his insurance coverage, and has provided verbal consent to proceed: Yes      Documentation:  I communicated with the patient and/or health care decision maker about patient is a 55-year-old white male. He has chronic low back pain that was seen Dr. Montse Franklin at the orthopedic [place]. His back pain have continued to worsen according to him. No recent MRIs. He states that Dr mendiola Assistant was not helpful to him when he was trying to go out of town. He since then was referred to [facility] pain management. Looks like he was late for his appointment and there was some paperwork that was missing. Still not completely comprehend the whole situation very well. He was rescheduled for late October. He has been out of his pain medication for a couple of days now. He takes the medication every 6 hours. Medication provide some relief to the point is tolerable. Pain radiates down his legs. Pain is debilitating. .   Details of this discussion including any medical advice provided: We will set up for an MRI of the lumbar spine. Cora Govea performed and reviewed by me today. We will refill the medication for 2 weeks until he can see pain management. We will follow-up in 2 weeks. I am trying to get his appointment moved to an earlier date. After his pain management appointment I will not continue to refill the medication. Patient states that he comprehend the situation. I affirm this is a Patient Initiated Episode with a Patient who has not had a related appointment within my department in the past 7 days or scheduled within the next 24 hours.     Patient identification was verified at the start of the visit: Yes    Total Time: minutes: 11-20 minutes    Note: not billable if this call serves to triage the patient into an appointment for the relevant concern      Dorita An `````````````````````````````````

## 2020-09-16 RX ORDER — FOLIC ACID 1 MG/1
1 TABLET ORAL DAILY
Qty: 30 TABLET | Refills: 5 | Status: SHIPPED | OUTPATIENT
Start: 2020-09-16 | End: 2021-02-18 | Stop reason: SDUPTHER

## 2020-09-16 RX ORDER — OXYCODONE AND ACETAMINOPHEN 10; 325 MG/1; MG/1
1 TABLET ORAL EVERY 6 HOURS PRN
Status: CANCELLED | OUTPATIENT
Start: 2020-09-16

## 2020-09-16 RX ORDER — OXYCODONE AND ACETAMINOPHEN 10; 325 MG/1; MG/1
1 TABLET ORAL EVERY 6 HOURS PRN
Qty: 60 TABLET | Refills: 0 | Status: SHIPPED | OUTPATIENT
Start: 2020-09-16 | End: 2020-10-01 | Stop reason: SDUPTHER

## 2020-09-16 RX ORDER — TIZANIDINE 4 MG/1
TABLET ORAL
Qty: 150 TABLET | Refills: 1 | Status: SHIPPED | OUTPATIENT
Start: 2020-09-16 | End: 2021-04-01 | Stop reason: SDUPTHER

## 2020-09-16 NOTE — TELEPHONE ENCOUNTER
Patient called stating his  for his disability states he needs to have a physical. He also wants a refill on folic acid and the pain medication until his apt with pain management. Pt is also wanting his testosterone checked. I explained we could talk to him about that at his physical.     He is also needing a referral to neurology. He had an apt back in July but it was cancelled. He needs to get that appointment rescheduled.

## 2020-09-23 ENCOUNTER — OFFICE VISIT (OUTPATIENT)
Dept: FAMILY MEDICINE CLINIC | Age: 60
End: 2020-09-23
Payer: MEDICARE

## 2020-09-23 VITALS
TEMPERATURE: 97.1 F | DIASTOLIC BLOOD PRESSURE: 72 MMHG | OXYGEN SATURATION: 98 % | SYSTOLIC BLOOD PRESSURE: 120 MMHG | WEIGHT: 237 LBS | HEART RATE: 61 BPM | BODY MASS INDEX: 35 KG/M2

## 2020-09-23 DIAGNOSIS — R53.83 FATIGUE, UNSPECIFIED TYPE: ICD-10-CM

## 2020-09-23 LAB
ALBUMIN SERPL-MCNC: 4 G/DL (ref 3.5–5.2)
ALP BLD-CCNC: 47 U/L (ref 40–130)
ALT SERPL-CCNC: 64 U/L (ref 5–41)
ANION GAP SERPL CALCULATED.3IONS-SCNC: 16 MMOL/L (ref 7–19)
AST SERPL-CCNC: 93 U/L (ref 5–40)
BILIRUB SERPL-MCNC: 0.8 MG/DL (ref 0.2–1.2)
BUN BLDV-MCNC: 14 MG/DL (ref 8–23)
CALCIUM SERPL-MCNC: 9.6 MG/DL (ref 8.8–10.2)
CHLORIDE BLD-SCNC: 101 MMOL/L (ref 98–111)
CO2: 26 MMOL/L (ref 22–29)
CREAT SERPL-MCNC: 0.7 MG/DL (ref 0.5–1.2)
GFR AFRICAN AMERICAN: >59
GFR NON-AFRICAN AMERICAN: >60
GLUCOSE BLD-MCNC: 104 MG/DL (ref 74–109)
HCT VFR BLD CALC: 45.4 % (ref 42–52)
HEMOGLOBIN: 14.8 G/DL (ref 14–18)
MCH RBC QN AUTO: 31.2 PG (ref 27–31)
MCHC RBC AUTO-ENTMCNC: 32.6 G/DL (ref 33–37)
MCV RBC AUTO: 95.8 FL (ref 80–94)
PDW BLD-RTO: 13.3 % (ref 11.5–14.5)
PLATELET # BLD: 54 K/UL (ref 130–400)
PMV BLD AUTO: 12.7 FL (ref 9.4–12.4)
POTASSIUM SERPL-SCNC: 4.3 MMOL/L (ref 3.5–5)
RBC # BLD: 4.74 M/UL (ref 4.7–6.1)
SODIUM BLD-SCNC: 143 MMOL/L (ref 136–145)
TOTAL PROTEIN: 6.4 G/DL (ref 6.6–8.7)
TSH SERPL DL<=0.05 MIU/L-ACNC: 2.23 UIU/ML (ref 0.27–4.2)
WBC # BLD: 7.1 K/UL (ref 4.8–10.8)

## 2020-09-23 PROCEDURE — 1036F TOBACCO NON-USER: CPT | Performed by: FAMILY MEDICINE

## 2020-09-23 PROCEDURE — G8427 DOCREV CUR MEDS BY ELIG CLIN: HCPCS | Performed by: FAMILY MEDICINE

## 2020-09-23 PROCEDURE — 99214 OFFICE O/P EST MOD 30 MIN: CPT | Performed by: FAMILY MEDICINE

## 2020-09-23 PROCEDURE — G8417 CALC BMI ABV UP PARAM F/U: HCPCS | Performed by: FAMILY MEDICINE

## 2020-09-23 PROCEDURE — 3017F COLORECTAL CA SCREEN DOC REV: CPT | Performed by: FAMILY MEDICINE

## 2020-09-23 ASSESSMENT — ENCOUNTER SYMPTOMS
RESPIRATORY NEGATIVE: 1
ALLERGIC/IMMUNOLOGIC NEGATIVE: 1
EYES NEGATIVE: 1
GASTROINTESTINAL NEGATIVE: 1

## 2020-09-23 NOTE — PROGRESS NOTES
SUBJECTIVE:    Patient ID: Jesus Garduno is a 56 y.o.male. HPI:   Patient here for wellness examination. Patient is a 24-year-old white male. He was asked by his lobular to make an appointment to get a physical and have cognitive testing. His MoCA score was 23. He was referred to neurology this summer secondary to memory problems. Dementia was suspected at that time. He never follow-up. Depression screen negative. Past history of tobacco use not recent tobacco use. He has history of alcoholism. He relapsed a week or 2 ago according to him. He is drinking sixpack of beer. Denies illegal drug use. He had chronic pain and see pain management. He also complains of fatigue. He still he feels tired all the time. Seems to be getting worse in the last couple of weeks to months. Denies any black stools or bloody stools. He refused colonoscopy but is willing to do Cologuard. Influenza pneumonia vaccine up-to-date. Past Medical History:   Diagnosis Date    Chronic back pain     Cirrhosis (HCC)     COPD (chronic obstructive pulmonary disease) (HCC)     GERD (gastroesophageal reflux disease)     Liver disease       Current Outpatient Medications   Medication Sig Dispense Refill    tiZANidine (ZANAFLEX) 4 MG tablet TAKE 1 TO 2 TABLETS BY MOUTH EVERY 8 HOURS AS NEEDED 150 tablet 1    oxyCODONE-acetaminophen (PERCOCET)  MG per tablet Take 1 tablet by mouth every 6 hours as needed for Pain for up to 14 days.  60 tablet 0    folic acid (FOLVITE) 1 MG tablet Take 1 tablet by mouth daily 30 tablet 5    traZODone (DESYREL) 100 MG tablet Take 2 tablets by mouth nightly 180 tablet 1    linaCLOtide (LINZESS) 72 MCG CAPS capsule Take 1 capsule by mouth every morning (before breakfast) Indications: gets samples 30 capsule 5    diclofenac (VOLTAREN) 50 MG EC tablet Take 50 mg by mouth 2 times daily      furosemide (LASIX) 20 MG tablet Take 20 mg by mouth daily      Umeclidinium Bromide (INCRUSE ELLIPTA) 62.5 MCG/INH AEPB Inhale 1 puff into the lungs daily      omeprazole (PRILOSEC) 40 MG delayed release capsule Take 40 mg by mouth daily      spironolactone (ALDACTONE) 50 MG tablet Take 50 mg by mouth daily      verapamil (VERELAN) 120 MG extended release capsule Take 120 mg by mouth 2 times daily      Cyanocobalamin (HM SUPER VITAMIN B12) 2500 MCG CHEW Take 3,000 mcg by mouth daily      Ascorbic Acid (VITAMIN C) 1000 MG tablet Take 1,000 mg by mouth daily      vitamin D (CHOLECALCIFEROL) 25 MCG (1000 UT) TABS tablet Take 1,000 Units by mouth daily      Vitamin E 400 units TABS Take 450 Units by mouth daily      Ferrous Sulfate (IRON) 325 (65 Fe) MG TABS Take 325 mg by mouth daily 30 tablet 5     No current facility-administered medications for this visit. Allergies   Allergen Reactions    Bee Venom        Review of Systems   Constitutional: Positive for fatigue. HENT: Negative. Eyes: Negative. Respiratory: Negative. Cardiovascular: Negative. Gastrointestinal: Negative. Endocrine: Negative. Genitourinary: Negative. Musculoskeletal: Negative. Skin: Negative. Allergic/Immunologic: Negative. Neurological: Negative. Hematological: Negative. Psychiatric/Behavioral: Negative. OBJECTIVE:    Physical Exam  Vitals signs reviewed. Constitutional:       Appearance: Normal appearance. He is well-developed. HENT:      Head: Normocephalic and atraumatic. Right Ear: Tympanic membrane, ear canal and external ear normal. There is no impacted cerumen. Left Ear: Tympanic membrane, ear canal and external ear normal. There is no impacted cerumen. Nose: Nose normal.      Mouth/Throat:      Lips: Pink. Mouth: Mucous membranes are moist.      Dentition: Normal dentition. Tongue: No lesions. Pharynx: Oropharynx is clear. Uvula midline. Tonsils: No tonsillar exudate or tonsillar abscesses.    Eyes:      General: Lids are normal.         Right eye: No discharge. Left eye: No discharge. Extraocular Movements:      Right eye: Normal extraocular motion. Left eye: Normal extraocular motion. Conjunctiva/sclera: Conjunctivae normal.      Right eye: Right conjunctiva is not injected. Left eye: Left conjunctiva is not injected. Pupils: Pupils are equal, round, and reactive to light. Neck:      Musculoskeletal: Normal range of motion and neck supple. Thyroid: No thyromegaly. Vascular: No carotid bruit or JVD. Cardiovascular:      Rate and Rhythm: Normal rate and regular rhythm. Pulses:           Carotid pulses are 2+ on the right side and 2+ on the left side. Radial pulses are 2+ on the right side and 2+ on the left side. Heart sounds: Normal heart sounds, S1 normal and S2 normal. No murmur. Pulmonary:      Effort: Pulmonary effort is normal. No accessory muscle usage. Breath sounds: Normal breath sounds. Abdominal:      General: Bowel sounds are normal. There is no distension or abdominal bruit. Palpations: Abdomen is soft. There is no mass. Tenderness: There is no abdominal tenderness. Hernia: No hernia is present. Musculoskeletal:      Cervical back: He exhibits decreased range of motion, tenderness, pain and spasm. Right lower leg: No edema. Left lower leg: No edema. Lymphadenopathy:      Cervical:      Right cervical: No superficial cervical adenopathy. Left cervical: No superficial cervical adenopathy. Skin:     General: Skin is warm and dry. Coloration: Skin is not jaundiced or pale. Findings: No lesion or rash. Nails: There is no clubbing. Neurological:      Mental Status: He is alert and oriented to person, place, and time. Cranial Nerves: No facial asymmetry. Motor: No weakness or tremor.       Coordination: Coordination normal.      Gait: Gait normal.      Deep Tendon Reflexes: Reflexes are normal and symmetric. Psychiatric:         Attention and Perception: Attention normal.         Mood and Affect: Mood normal.         Speech: Speech normal.         Behavior: Behavior normal.         Thought Content: Thought content normal.         Cognition and Memory: Memory normal.         Judgment: Judgment normal.        /72   Pulse 61   Temp 97.1 °F (36.2 °C) (Temporal)   Wt 237 lb (107.5 kg)   SpO2 98%   BMI 35.00 kg/m²      ASSESSMENT:     Diagnosis Orders   1. Wellness examination     2. Potential for cognitive impairment-marked current impairment versus early dementia    3. Liver disease-cirrhosis    4. Alcohol abuse- with recent recurrence    5. Fatigue, unspecified type- more than likely multifactorial. CBC    Comprehensive Metabolic Panel    Testosterone Free and Total Male    TSH without Reflex   6. Colon cancer screening  Cologuard        PLAN:    1. Encourage abstinence from alcohol. Encourage diet and exercise. 2.  Refer to neurology. 3.  Continue to monitor. Continue treatment plan. 4.  Encouraged abstinence from alcohol. 5.  Blood work. 6.  Cologuard  Follow-up after seen by neurology.

## 2020-09-23 NOTE — LETTER
[facility] AT [facility]  [address] [address]  Phone: [phone]  Fax: [phone]    Bria Houston MD        September 23, 2020     Patient: Leesa Viramontes   YOB: 1960   Date of Visit: 9/23/2020       To Whom it May Concern:    Thiago Grewal was seen in my clinic on 9/23/2020. We preformed the [facility] cognitive assessment test, he received a 24/30 which is sign of mental decline. Has been referred to neurology for formal testing. If you have any questions or concerns, please don't hesitate to call.     Sincerely,         Bria Houston MD

## 2020-09-23 NOTE — PATIENT INSTRUCTIONS
Patient Education        Learning About Alcohol Use Disorder  What is alcohol use disorder? Alcohol use disorder means that a person drinks alcohol even though it causes harm to themselves or others. It can range from mild to severe. The more signs of this disorder you have, the more severe it may be. Moderate to severe alcohol use disorder is sometimes called addiction. People who have it may find it hard to control their use of alcohol. People who have this disorder may argue with others about how much they're drinking. Their job may be affected because of drinking. They may drink when it's dangerous or illegal, such as when they drive. They also may have a strong need, or craving, to drink. They may feel like they must drink just to get by. Their drinking may increase their risk of getting hurt or being in a car crash. Over time, drinking too much alcohol may cause health problems. These may include high blood pressure, liver problems, or problems with digestion. What are the signs? Maybe you've wondered about your alcohol habits, or how to tell if your drinking is becoming a problem. Here are some of the signs of alcohol use disorder. You may have it if you have two or more of the following signs:  · You drink larger amounts of alcohol than you ever meant to. Or you've been drinking for a longer time than you ever meant to. · You can't cut down or control your use. Or you constantly wish you could cut down. · You spend a lot of time getting or drinking alcohol or recovering from its effects. · You have strong cravings for alcohol. · You can no longer do your main jobs at work, at school, or at home. · You keep drinking alcohol, even though your use hurts your relationships. · You have stopped doing important activities because of your alcohol use. · You drink alcohol in situations where doing so is dangerous. · You keep drinking alcohol even though you know it's causing health problems.   · You high-carbohydrate diet to get enough calories, since you may have to limit fat. You may need to spread carbohydrate throughout your meals and snacks to control the amount of sugar in your blood. Eat six small meals a day, rather than three big ones. Carbohydrate is found in:  ? Whole-grain and refined breads and cereals. ? Some vegetables. ? Cooked beans (such as kidney or black beans) and peas (such as lentils or split peas). ? Fruits. ? Low-fat or nonfat milk and milk products, which also supply protein. ? Candy, table sugar, and sugary soda drinks (try to limit these). · Follow your doctor's or dietitian's instructions on how to get the right amount of protein in your diet. Examples of animal protein are:  ? Meat, fish, and poultry. ? Eggs. ? Milk and milk products. · Your doctor or dietitian may ask you to eat a certain amount of protein that comes from plants (rather than protein that comes from animals). You can get plant protein from foods such as:  ? Cooked dried beans and peas. ? Peanut butter, nuts, and seeds. ? Tofu. · Limit salt, if your doctor tells you to. This will help prevent fluid buildup in your belly and chest, which can cause serious problems. Salt is in many prepared foods, such as gonzalez, canned foods, snack foods, sauces, and soups. Look for reduced-salt products. · Your doctor may recommend vitamin and mineral supplements. However, do not take any other medicine, including over-the-counter medicines, vitamins, and herbal products, without talking to your doctor first.  · Do NOT take acetaminophen (Tylenol), ibuprofen (Advil, Motrin), or naproxen (Aleve). These can cause more liver damage. · Do not drink any alcohol. It can harm your liver. Talk to your doctor if you need help to stop drinking. · If you have a loss of appetite or have nausea or vomiting, try to:  ? Stay away from foods and food smells that make you feel worse. ? Avoid greasy or fatty foods.   ? Eat food that settles your stomach when it feels upset. Try crackers, dry toast, or ivan (ivan tea, hard ivan candy, or crystallized ivan). When should you call for help? Watch closely for changes in your health, and be sure to contact your doctor if you have any problems. Where can you learn more? Go to https://chpepiceweb.[employer]. org and sign in to your [email or web address] account. Enter C627 in the [employer] box to learn more about \"Liver Disease Diet: Care Instructions. \"     If you do not have an account, please click on the \"Sign Up Now\" link. Current as of: August 22, 2019               Content Version: 12.5  © 3797-4019 Healthwise, [employer]. Care instructions adapted under license by [facility] ([facility]). If you have questions about a medical condition or this instruction, always ask your healthcare professional. [address] any warranty or liability for your use of this information. Patient Education        Learning About Positive Thinking  What is positive thinking? Positive thinking, or healthy thinking, is a way to help you stay well or cope with a health problem by changing how you think. It's based on research that shows that you can change how you think. And how you think affects how you feel. Cognitive-behavioral therapy, also called CBT, is a therapy that is often used to help people think in a healthy way. It focuses on thought (cognitive) and action (behavioral). How can positive thinking help you? If you think in a positive way, you may be more able to care for yourself and handle life's challenges. You will feel better. And you may be more able to avoid or cope with stress, anxiety, and depression. CBT may be able to help you sleep better and lose weight. How can you get started with positive thinking? CBT involves techniques that you can practice every day so that healthy thinking comes naturally. Here are the steps for one technique. 1. Stop.  When you notice a negative thought, stop it in its tracks and write it down. 2. Ask. Look at that thought and ask yourself whether it is helpful or unhelpful right now. 3. Choose. Choose a new, helpful thought to replace a negative one. Here's an example of how this might work:  · In a job review, your boss praised several things about your work. But you're feeling down because she had one small criticism. You might even think, \"I'm no good at my job\" or \"She doesn't like me. I must be bad. \" These are negative thoughts. You want to stop them. · Ask yourself questions about the situation and your negative thoughts. You might ask, \"What did my boss say exactly? \" \"Were there positive comments? \" \"Why do I focus only on one criticism? \" Your answers can help you find more accurate and helpful statements. · Now choose a helpful thought to replace the negative thoughts. For example, you might think, \"I've done a lot of good work this year, and my boss noticed it. She thought there was one area I can improve. So I'll think of some things I can do to get stronger in that area. \"  With time and practice, you can learn to see that the harsh things you say to yourself may keep you from enjoying your life and work. You can replace them with more helpful thoughts. Where can you learn more? Go to https://chclive.[employer]. org and sign in to your [employer] account. Enter X486 in the [employer] box to learn more about \"Learning About Positive Thinking. \"     If you do not have an account, please click on the \"Sign Up Now\" link. Current as of: January 31, 2020               Content Version: 12.5  © 2489-1613 Healthwise, Incorporated. Care instructions adapted under license by [facility] [employer] [facility] ([facility]). If you have questions about a medical condition or this instruction, always ask your healthcare professional. [address] any warranty or liability for your use of this information.

## 2020-09-25 LAB
SEX HORMONE BINDING GLOBULIN: 46 NMOL/L (ref 11–80)
TESTOSTERONE FREE-NONMALE: 80.3 PG/ML (ref 47–244)
TESTOSTERONE TOTAL: 477 NG/DL (ref 220–1000)

## 2020-09-30 ENCOUNTER — TELEPHONE (OUTPATIENT)
Dept: FAMILY MEDICINE CLINIC | Age: 60
End: 2020-09-30

## 2020-09-30 NOTE — TELEPHONE ENCOUNTER
Pt refused hematology referral due to financial reasons.  Told patient to call back when he gets his finances settled to let us know so we can start the process for the referral.

## 2020-09-30 NOTE — TELEPHONE ENCOUNTER
Lissett Pearson would like a call to discuss his Labs results. his preferred call back time is  Anytime   Pt called to review test results.

## 2020-10-01 RX ORDER — OXYCODONE AND ACETAMINOPHEN 10; 325 MG/1; MG/1
1 TABLET ORAL EVERY 6 HOURS PRN
Qty: 60 TABLET | Refills: 0 | Status: SHIPPED | OUTPATIENT
Start: 2020-10-01 | End: 2020-10-15 | Stop reason: SDUPTHER

## 2020-10-09 ENCOUNTER — TELEPHONE (OUTPATIENT)
Dept: ADMINISTRATIVE | Age: 60
End: 2020-10-09

## 2020-10-09 NOTE — TELEPHONE ENCOUNTER
The pt is calling stating his Physical Therapist would like the PCP to place an order for an x-ray of his neck asap please?     Please Advise

## 2020-10-12 NOTE — TELEPHONE ENCOUNTER
[facility], per Dr. Lyndsey Cuadra. Pt did have a c-spine xray on 8/10/2020. Faxed that report to Keyur Syed at [phone] [facility] to review and see if she wanted it repeated.

## 2020-10-13 NOTE — TELEPHONE ENCOUNTER
Gayle Guy with [facility] PT department called stating the patient did not need a repeat C-spine xray, but an MRI C-spine would be helpful. Please advise.

## 2020-10-15 RX ORDER — NITROGLYCERIN 0.4 MG/1
0.4 TABLET SUBLINGUAL EVERY 5 MIN PRN
Qty: 25 TABLET | Refills: 3 | Status: SHIPPED | OUTPATIENT
Start: 2020-10-15

## 2020-10-15 RX ORDER — OXYCODONE AND ACETAMINOPHEN 10; 325 MG/1; MG/1
1 TABLET ORAL EVERY 6 HOURS PRN
Qty: 60 TABLET | Refills: 0 | Status: SHIPPED | OUTPATIENT
Start: 2020-10-15 | End: 2020-10-28 | Stop reason: ALTCHOICE

## 2020-10-15 NOTE — TELEPHONE ENCOUNTER
Pt called stating he went to the ER for chest heaviness and shortness of breath. He called needing an appointment to follow up with Dr. Sekou Stewart. Pt has an apt scheduled for Tuesday and will make sure patient has nitro to use prn. Pt also needs pain medication refill for 2 weeks until his pain management appointment, 10/28/20.

## 2020-10-19 ENCOUNTER — VIRTUAL VISIT (OUTPATIENT)
Dept: FAMILY MEDICINE CLINIC | Age: 60
End: 2020-10-19
Payer: MEDICARE

## 2020-10-19 VITALS — HEART RATE: 66 BPM | BODY MASS INDEX: 35.25 KG/M2 | WEIGHT: 238 LBS | HEIGHT: 69 IN

## 2020-10-19 PROCEDURE — 3017F COLORECTAL CA SCREEN DOC REV: CPT | Performed by: FAMILY MEDICINE

## 2020-10-19 PROCEDURE — G8484 FLU IMMUNIZE NO ADMIN: HCPCS | Performed by: FAMILY MEDICINE

## 2020-10-19 PROCEDURE — G0438 PPPS, INITIAL VISIT: HCPCS | Performed by: FAMILY MEDICINE

## 2020-10-19 ASSESSMENT — LIFESTYLE VARIABLES
HAS A RELATIVE, FRIEND, DOCTOR, OR ANOTHER HEALTH PROFESSIONAL EXPRESSED CONCERN ABOUT YOUR DRINKING OR SUGGESTED YOU CUT DOWN: 0
HOW OFTEN DURING THE LAST YEAR HAVE YOU FAILED TO DO WHAT WAS NORMALLY EXPECTED FROM YOU BECAUSE OF DRINKING: 0
HOW OFTEN DURING THE LAST YEAR HAVE YOU BEEN UNABLE TO REMEMBER WHAT HAPPENED THE NIGHT BEFORE BECAUSE YOU HAD BEEN DRINKING: 0
AUDIT TOTAL SCORE: 3
HOW OFTEN DO YOU HAVE A DRINK CONTAINING ALCOHOL: 3
HOW OFTEN DURING THE LAST YEAR HAVE YOU FOUND THAT YOU WERE NOT ABLE TO STOP DRINKING ONCE YOU HAD STARTED: 0
HAVE YOU OR SOMEONE ELSE BEEN INJURED AS A RESULT OF YOUR DRINKING: 0
HOW OFTEN DO YOU HAVE SIX OR MORE DRINKS ON ONE OCCASION: 0
HOW MANY STANDARD DRINKS CONTAINING ALCOHOL DO YOU HAVE ON A TYPICAL DAY: 0
HOW OFTEN DURING THE LAST YEAR HAVE YOU HAD A FEELING OF GUILT OR REMORSE AFTER DRINKING: 0
HOW OFTEN DURING THE LAST YEAR HAVE YOU NEEDED AN ALCOHOLIC DRINK FIRST THING IN THE MORNING TO GET YOURSELF GOING AFTER A NIGHT OF HEAVY DRINKING: 0
AUDIT-C TOTAL SCORE: 3

## 2020-10-19 ASSESSMENT — PATIENT HEALTH QUESTIONNAIRE - PHQ9
SUM OF ALL RESPONSES TO PHQ QUESTIONS 1-9: 2
SUM OF ALL RESPONSES TO PHQ QUESTIONS 1-9: 2
1. LITTLE INTEREST OR PLEASURE IN DOING THINGS: 2
SUM OF ALL RESPONSES TO PHQ9 QUESTIONS 1 & 2: 2
2. FEELING DOWN, DEPRESSED OR HOPELESS: 0
SUM OF ALL RESPONSES TO PHQ QUESTIONS 1-9: 2

## 2020-10-19 NOTE — PROGRESS NOTES
verapamil (VERELAN) 120 MG extended release capsule Take 120 mg by mouth 2 times daily Yes Historical Provider, MD   Cyanocobalamin (HM SUPER VITAMIN B12) 2500 MCG CHEW Take 3,000 mcg by mouth daily Yes Historical Provider, MD   Ascorbic Acid (VITAMIN C) 1000 MG tablet Take 1,000 mg by mouth daily Yes Historical Provider, MD   vitamin D (CHOLECALCIFEROL) 25 MCG (1000 UT) TABS tablet Take 1,000 Units by mouth daily  Historical Provider, MD   Vitamin E 400 units TABS Take 450 Units by mouth daily  Historical Provider, MD   Ferrous Sulfate (IRON) 325 (65 Fe) MG TABS Take 325 mg by mouth daily  Patient not taking: Reported on 10/19/2020  Raymond Garcia MD       Past Medical History:   Diagnosis Date    Chronic back pain     [facility]     COPD [facility]chronic obstructive pulmonary disease) [facility]     GERD (gastroesophageal reflux disease)     Liver disease        Past Surgical History:   Procedure Laterality Date    FRACTURE SURGERY Left 1980    [facility]         Family History   Problem Relation Age of Onset    Other Mother         advance age   Hanny Solum Alcohol Abuse Father     Heart Disease Brother     Cancer Brother         Throat Cancer       CareTeam (Including outside providers/suppliers regularly involved in providing care):   Patient Care Team:  Raymond Garcia MD as PCP - General (Family Medicine)  Raymond Garcia MD as PCP - [facility] Empaneled Provider  Malaika Ramos as Consulting Physician (Gastroenterology)  Karla Martini MD as Consulting Physician    Wt Readings from Last 3 Encounters:   10/19/20 238 lb (108 kg)   09/23/20 237 lb (107.5 kg)   08/18/20 230 lb (104.3 kg)     Vitals:    10/19/20 1426   Pulse: 66   Weight: 238 lb (108 kg)   Height: 5' 9\" (1.753 m)     Body mass index is 35.15 kg/m². Based upon direct observation of the patient, evaluation of cognition reveals recent and remote memory intact.     Patient's complete Health Risk Assessment and screening values have been your stairways have a railing or banister?: Yes  Are your doorways, halls and stairs free of clutter?: Yes  Do you always fasten your seatbelt when you are in a car?: Yes  Safety Interventions:  · Home safety tips provided    Personalized Preventive Plan   Current Health Maintenance Status  Immunization History   Administered Date(s) Administered    Influenza Virus Vaccine 09/09/2020    Pneumococcal Polysaccharide ([id number]) 08/23/2020, 09/25/2020        Health Maintenance   Topic Date Due    Hepatitis A vaccine (1 of 2 - Risk 2-dose series) 01/12/1961    HIV screen  01/12/1973    Hepatitis B vaccine (1 of 3 - Risk 3-dose series) 01/12/1977    DTaP/Tdap/Td vaccine (1 - Tdap) 01/12/1977    Diabetes screen  01/12/1998    Shingles Vaccine (1 of 2) 01/12/2008    Colon cancer screen colonoscopy  01/12/2008    Annual Wellness Visit (AWV)  05/29/2020    Potassium monitoring  09/23/2021    Creatinine monitoring  09/23/2021    Lipid screen  05/18/2025    Flu vaccine  Completed    Pneumococcal 0-64 years Vaccine  Completed    Hepatitis C screen  Completed    Hib vaccine  Aged Out    Meningococcal (ACWY) vaccine  Aged Out     Recommendations for [place] Due: see orders and patient instructions/AVS.  . Health Maintenance plan reviewed with patient. Patient reports he has the cologuard test kit at home. Recommended screening schedule for the next 5-10 years is provided to the patient in written form: see Patient Instructions/AVS.    Antoinette Kelley LPN, 29/37/2021, performed the documented evaluation under the direct supervision of the attending physician. Gabriele Kebede is a 56 y.o. male evaluated via telephone on 10/19/2020.       Consent:  He and/or health care decision maker is aware that that he may receive a bill for this telephone service, depending on his insurance coverage, and has provided verbal consent to proceed: Yes      Documentation:  I communicated with the patient and/or health care decision maker about AWV. Details of this discussion including any medical advice provided. I affirm this is a Patient Initiated Episode with a Patient who has not had a related appointment within my department in the past 7 days or scheduled within the next 24 hours.     Patient identification was verified at the start of the visit: Yes    Total Time: minutes: 21-30 minutes    Note: not billable if this call serves to triage the patient into an appointment for the relevant concern      Jabier Martinez

## 2020-10-19 NOTE — PATIENT INSTRUCTIONS
Personalized Preventive Plan for Clyde Sample - 10/19/2020  Medicare offers a range of preventive health benefits. Some of the tests and screenings are paid in full while other may be subject to a deductible, co-insurance, and/or copay. Some of these benefits include a comprehensive review of your medical history including lifestyle, illnesses that may run in your family, and various assessments and screenings as appropriate. After reviewing your medical record and screening and assessments performed today your provider may have ordered immunizations, labs, imaging, and/or referrals for you. A list of these orders (if applicable) as well as your Preventive Care list are included within your After Visit Summary for your review. Other Preventive Recommendations:    · A preventive eye exam performed by an eye specialist is recommended every 1-2 years to screen for glaucoma; cataracts, macular degeneration, and other eye disorders. · A preventive dental visit is recommended every 6 months. · Try to get at least 150 minutes of exercise per week or 10,000 steps per day on a pedometer . · Order or download the FREE \"Exercise & Physical Activity: Your Everyday Guide\" from [employer]. Call 3-183.337.8937 or search [employer] online. · You need 1909-1614 mg of calcium and 6262-9367 IU of vitamin D per day. It is possible to meet your calcium requirement with diet alone, but a vitamin D supplement is usually necessary to meet this goal.  · When exposed to the sun, use a sunscreen that protects against both UVA and UVB radiation with an SPF of 30 or greater. Reapply every 2 to 3 hours or after sweating, drying off with a towel, or swimming. · Always wear a seat belt when traveling in a car. Always wear a helmet when riding a bicycle or motorcycle.

## 2020-10-20 ENCOUNTER — OFFICE VISIT (OUTPATIENT)
Dept: FAMILY MEDICINE CLINIC | Age: 60
End: 2020-10-20
Payer: MEDICARE

## 2020-10-20 VITALS
SYSTOLIC BLOOD PRESSURE: 122 MMHG | TEMPERATURE: 97.9 F | OXYGEN SATURATION: 97 % | BODY MASS INDEX: 34.56 KG/M2 | WEIGHT: 234 LBS | DIASTOLIC BLOOD PRESSURE: 68 MMHG | HEART RATE: 63 BPM

## 2020-10-20 DIAGNOSIS — I20.8 STABLE ANGINA PECTORIS: ICD-10-CM

## 2020-10-20 LAB
ALBUMIN SERPL-MCNC: 4.1 G/DL (ref 3.5–5.2)
ALP BLD-CCNC: 64 U/L (ref 40–130)
ALT SERPL-CCNC: 18 U/L (ref 5–41)
ANION GAP SERPL CALCULATED.3IONS-SCNC: 12 MMOL/L (ref 7–19)
AST SERPL-CCNC: 29 U/L (ref 5–40)
BILIRUB SERPL-MCNC: 1 MG/DL (ref 0.2–1.2)
BUN BLDV-MCNC: 8 MG/DL (ref 8–23)
CALCIUM SERPL-MCNC: 9.1 MG/DL (ref 8.8–10.2)
CHLORIDE BLD-SCNC: 101 MMOL/L (ref 98–111)
CO2: 27 MMOL/L (ref 22–29)
CREAT SERPL-MCNC: 0.9 MG/DL (ref 0.5–1.2)
GFR AFRICAN AMERICAN: >59
GFR NON-AFRICAN AMERICAN: >60
GLUCOSE BLD-MCNC: 101 MG/DL (ref 74–109)
HCT VFR BLD CALC: 45.7 % (ref 42–52)
HEMOGLOBIN: 14.7 G/DL (ref 14–18)
MCH RBC QN AUTO: 30.8 PG (ref 27–31)
MCHC RBC AUTO-ENTMCNC: 32.2 G/DL (ref 33–37)
MCV RBC AUTO: 95.8 FL (ref 80–94)
PDW BLD-RTO: 15.2 % (ref 11.5–14.5)
PLATELET # BLD: 72 K/UL (ref 130–400)
PMV BLD AUTO: 12.4 FL (ref 9.4–12.4)
POTASSIUM SERPL-SCNC: 4.1 MMOL/L (ref 3.5–5)
RBC # BLD: 4.77 M/UL (ref 4.7–6.1)
SODIUM BLD-SCNC: 140 MMOL/L (ref 136–145)
TOTAL PROTEIN: 7 G/DL (ref 6.6–8.7)
WBC # BLD: 7.2 K/UL (ref 4.8–10.8)

## 2020-10-20 PROCEDURE — 1036F TOBACCO NON-USER: CPT | Performed by: FAMILY MEDICINE

## 2020-10-20 PROCEDURE — G8427 DOCREV CUR MEDS BY ELIG CLIN: HCPCS | Performed by: FAMILY MEDICINE

## 2020-10-20 PROCEDURE — 99214 OFFICE O/P EST MOD 30 MIN: CPT | Performed by: FAMILY MEDICINE

## 2020-10-20 PROCEDURE — 3017F COLORECTAL CA SCREEN DOC REV: CPT | Performed by: FAMILY MEDICINE

## 2020-10-20 PROCEDURE — G8417 CALC BMI ABV UP PARAM F/U: HCPCS | Performed by: FAMILY MEDICINE

## 2020-10-20 PROCEDURE — G8484 FLU IMMUNIZE NO ADMIN: HCPCS | Performed by: FAMILY MEDICINE

## 2020-10-20 RX ORDER — BETAMETHASONE DIPROPIONATE 0.5 MG/G
LOTION TOPICAL
Qty: 1 BOTTLE | Refills: 0 | Status: ON HOLD | OUTPATIENT
Start: 2020-10-20 | End: 2021-08-11

## 2020-10-20 ASSESSMENT — ENCOUNTER SYMPTOMS
GASTROINTESTINAL NEGATIVE: 1
RESPIRATORY NEGATIVE: 1
ALLERGIC/IMMUNOLOGIC NEGATIVE: 1
EYES NEGATIVE: 1

## 2020-10-20 NOTE — PROGRESS NOTES
SUBJECTIVE:    Patient ID: Shannon Banegas is a 56 y.o.male. HPI:   Here for follow-up of multiple medical problems. Patient is a 49-year-old white male. He was seen at [facility] emergency room secondary to chest pain. Chest pain was pressure in nature. It was on the left side of the chest.  Chest pain was associated with shortness of breath and nausea. He went to the ER. He had 2 cardiac enzymes and they were negative. Chest pain resolved and he was discharged home with follow-up. He said that he has some mild symptoms since stenting of chest pressure and shortness of breath but they self resolved. Nitroglycerin helped in the emergency room. He cannot walking on treadmill secondary to spinal disease. He also have a rash on the scalp that he want me to give something for that. Past Medical History:   Diagnosis Date    Chronic back pain     Cirrhosis (HCC)     COPD (chronic obstructive pulmonary disease) (HCC)     GERD (gastroesophageal reflux disease)     Liver disease       Current Outpatient Medications   Medication Sig Dispense Refill    oxyCODONE-acetaminophen (PERCOCET)  MG per tablet Take 1 tablet by mouth every 6 hours as needed for Pain for up to 14 days. 60 tablet 0    nitroGLYCERIN (NITROSTAT) 0.4 MG SL tablet Place 1 tablet under the tongue every 5 minutes as needed for Chest pain up to max of 3 total doses.  If no relief after 1 dose, call 911. 25 tablet 3    tiZANidine (ZANAFLEX) 4 MG tablet TAKE 1 TO 2 TABLETS BY MOUTH EVERY 8 HOURS AS NEEDED 375 tablet 1    folic acid (FOLVITE) 1 MG tablet Take 1 tablet by mouth daily 30 tablet 5    traZODone (DESYREL) 100 MG tablet Take 2 tablets by mouth nightly 180 tablet 1    linaCLOtide (LINZESS) 72 MCG CAPS capsule Take 1 capsule by mouth every morning (before breakfast) Indications: gets samples 30 capsule 5    diclofenac (VOLTAREN) 50 MG EC tablet Take 50 mg by mouth 2 times daily      furosemide (LASIX) 20 MG tablet Take 20 abscesses. Eyes:      General: Lids are normal.         Right eye: No discharge. Left eye: No discharge. Extraocular Movements:      Right eye: Normal extraocular motion. Left eye: Normal extraocular motion. Conjunctiva/sclera: Conjunctivae normal.      Right eye: Right conjunctiva is not injected. Left eye: Left conjunctiva is not injected. Pupils: Pupils are equal, round, and reactive to light. Neck:      Musculoskeletal: Normal range of motion and neck supple. Thyroid: No thyromegaly. Vascular: No carotid bruit or JVD. Cardiovascular:      Rate and Rhythm: Normal rate and regular rhythm. Pulses:           Carotid pulses are 2+ on the right side and 2+ on the left side. Radial pulses are 2+ on the right side and 2+ on the left side. Heart sounds: Normal heart sounds, S1 normal and S2 normal. No murmur. Pulmonary:      Effort: Pulmonary effort is normal. No accessory muscle usage. Breath sounds: Normal breath sounds. Abdominal:      General: Bowel sounds are normal. There is no distension or abdominal bruit. Palpations: Abdomen is soft. There is no mass. Tenderness: There is no abdominal tenderness. Hernia: No hernia is present. Musculoskeletal:      Cervical back: He exhibits decreased range of motion, tenderness, pain and spasm. Right lower leg: No edema. Left lower leg: No edema. Lymphadenopathy:      Cervical:      Right cervical: No superficial cervical adenopathy. Left cervical: No superficial cervical adenopathy. Skin:     General: Skin is warm and dry. Coloration: Skin is not jaundiced or pale. Findings: No lesion or rash. Nails: There is no clubbing. Comments: Erythematous patches in the hairline with dry scaly skin   Neurological:      Mental Status: He is alert and oriented to person, place, and time. Cranial Nerves: No facial asymmetry.       Motor: No weakness or tremor. Coordination: Coordination normal.      Gait: Gait normal.      Deep Tendon Reflexes: Reflexes are normal and symmetric. Psychiatric:         Attention and Perception: Attention normal.         Mood and Affect: Mood normal.         Speech: Speech normal.         Behavior: Behavior normal.         Thought Content: Thought content normal.         Cognition and Memory: Memory normal.         Judgment: Judgment normal.        /68   Pulse 63   Temp 97.9 °F (36.6 °C)   Wt 234 lb (106.1 kg)   SpO2 97%   BMI 34.56 kg/m²      ASSESSMENT:     Diagnosis Orders   1. Stable angina pectoris (HCC)-need work-up ECHO (Dobutamine) Pharmacological Stress Test    CBC    Comprehensive Metabolic Panel   2. Seborrheic dermatitis of scalp-no control         PLAN:    1. Obtain records from [facility]. Set up for stress test.  Blood work. 2.  Will provide a prescription for topical steroid.   Follow-up after stress test

## 2020-10-21 ENCOUNTER — TELEPHONE (OUTPATIENT)
Dept: CARDIOLOGY | Age: 60
End: 2020-10-21

## 2020-10-21 NOTE — TELEPHONE ENCOUNTER
Lara with [facility] called to get patient scheduled for heart cath with Dr. Ree Gallego. Patient had a positive stress test today that Dr. Jazmine Chino read. Per Dr. Jazmine Chino he wants patient to have heart cath with Dr. Ree Gallego. Dr. Jazmine Chino explained results of stress test to patient and patient is aware he needs a heart cath. They will fax over stress test results. Patient can be reached at [phone].

## 2020-10-22 RX ORDER — ISOSORBIDE MONONITRATE 30 MG/1
30 TABLET, EXTENDED RELEASE ORAL DAILY
Qty: 30 TABLET | Refills: 3 | Status: SHIPPED | OUTPATIENT
Start: 2020-10-22 | End: 2021-03-16 | Stop reason: SDUPTHER

## 2020-10-22 RX ORDER — LEVOFLOXACIN 750 MG/1
750 TABLET ORAL DAILY
Qty: 10 TABLET | Refills: 0 | Status: SHIPPED | OUTPATIENT
Start: 2020-10-22 | End: 2020-11-01

## 2020-10-22 NOTE — TELEPHONE ENCOUNTER
I called pt and he let me know that he does not have a  so I cannot get him scheduled for an outpatient heart cath,I did let him know that if he has chest pain he needs to call the ambulance to pick him up. If he happens to find a  then I can get him rescheduled. SM

## 2020-10-23 ENCOUNTER — HOSPITAL ENCOUNTER (INPATIENT)
Age: 60
LOS: 1 days | Discharge: HOME | DRG: 287 | End: 2020-10-23
Attending: EMERGENCY MEDICINE | Admitting: HOSPITALIST
Payer: MEDICARE

## 2020-10-23 ENCOUNTER — APPOINTMENT (OUTPATIENT)
Dept: GENERAL RADIOLOGY | Age: 60
DRG: 287 | End: 2020-10-23
Payer: MEDICARE

## 2020-10-23 VITALS
RESPIRATION RATE: 20 BRPM | TEMPERATURE: 98 F | BODY MASS INDEX: 34.6 KG/M2 | HEIGHT: 69 IN | DIASTOLIC BLOOD PRESSURE: 63 MMHG | SYSTOLIC BLOOD PRESSURE: 115 MMHG | WEIGHT: 233.6 LBS | HEART RATE: 56 BPM | OXYGEN SATURATION: 94 %

## 2020-10-23 PROBLEM — R07.9 CHRONIC CHEST PAIN WITH HIGH RISK FOR CAD: Status: ACTIVE | Noted: 2020-10-23

## 2020-10-23 PROBLEM — Z91.89 CHRONIC CHEST PAIN WITH HIGH RISK FOR CAD: Status: ACTIVE | Noted: 2020-10-23

## 2020-10-23 PROBLEM — G89.29 CHRONIC CHEST PAIN WITH HIGH RISK FOR CAD: Status: ACTIVE | Noted: 2020-10-23

## 2020-10-23 PROBLEM — Z79.891 CHRONIC PRESCRIPTION OPIATE USE: Status: ACTIVE | Noted: 2020-10-23

## 2020-10-23 LAB
ADENOVIRUS BY PCR: NOT DETECTED
ALBUMIN SERPL-MCNC: 3.9 G/DL (ref 3.5–5.2)
ALP BLD-CCNC: 57 U/L (ref 40–130)
ALT SERPL-CCNC: 18 U/L (ref 5–41)
ANION GAP SERPL CALCULATED.3IONS-SCNC: 16 MMOL/L (ref 7–19)
APTT: 31.2 SEC (ref 26–36.2)
AST SERPL-CCNC: 27 U/L (ref 5–40)
BASOPHILS ABSOLUTE: 0.1 K/UL (ref 0–0.2)
BASOPHILS RELATIVE PERCENT: 0.7 % (ref 0–1)
BILIRUB SERPL-MCNC: 0.9 MG/DL (ref 0.2–1.2)
BORDETELLA PARAPERTUSSIS BY PCR: NOT DETECTED
BORDETELLA PERTUSSIS BY PCR: NOT DETECTED
BUN BLDV-MCNC: 11 MG/DL (ref 8–23)
CALCIUM SERPL-MCNC: 8.4 MG/DL (ref 8.8–10.2)
CHLAMYDOPHILIA PNEUMONIAE BY PCR: NOT DETECTED
CHLORIDE BLD-SCNC: 101 MMOL/L (ref 98–111)
CO2: 19 MMOL/L (ref 22–29)
CORONAVIRUS 229E BY PCR: NOT DETECTED
CORONAVIRUS HKU1 BY PCR: NOT DETECTED
CORONAVIRUS NL63 BY PCR: NOT DETECTED
CORONAVIRUS OC43 BY PCR: NOT DETECTED
CREAT SERPL-MCNC: 0.9 MG/DL (ref 0.5–1.2)
D DIMER: 0.29 UG/ML FEU (ref 0–0.48)
EKG P AXIS: 56 DEGREES
EKG P-R INTERVAL: 158 MS
EKG Q-T INTERVAL: 348 MS
EKG QRS DURATION: 96 MS
EKG QTC CALCULATION (BAZETT): 416 MS
EKG T AXIS: 29 DEGREES
EOSINOPHILS ABSOLUTE: 0.1 K/UL (ref 0–0.6)
EOSINOPHILS RELATIVE PERCENT: 0.9 % (ref 0–5)
GFR AFRICAN AMERICAN: >59
GFR NON-AFRICAN AMERICAN: >60
GLUCOSE BLD-MCNC: 157 MG/DL (ref 74–109)
HCT VFR BLD CALC: 43.2 % (ref 42–52)
HEMOGLOBIN: 14.4 G/DL (ref 14–18)
HUMAN METAPNEUMOVIRUS BY PCR: NOT DETECTED
HUMAN RHINOVIRUS/ENTEROVIRUS BY PCR: NOT DETECTED
IMMATURE GRANULOCYTES #: 0.2 K/UL
INFLUENZA A BY PCR: NOT DETECTED
INFLUENZA B BY PCR: NOT DETECTED
INR BLD: 1.24 (ref 0.88–1.18)
LV EF: 58 %
LVEF MODALITY: NORMAL
LYMPHOCYTES ABSOLUTE: 1.1 K/UL (ref 1.1–4.5)
LYMPHOCYTES RELATIVE PERCENT: 12.3 % (ref 20–40)
MCH RBC QN AUTO: 31 PG (ref 27–31)
MCHC RBC AUTO-ENTMCNC: 33.3 G/DL (ref 33–37)
MCV RBC AUTO: 93.1 FL (ref 80–94)
MONOCYTES ABSOLUTE: 0.7 K/UL (ref 0–0.9)
MONOCYTES RELATIVE PERCENT: 7.5 % (ref 0–10)
MYCOPLASMA PNEUMONIAE BY PCR: NOT DETECTED
NEUTROPHILS ABSOLUTE: 6.8 K/UL (ref 1.5–7.5)
NEUTROPHILS RELATIVE PERCENT: 76.9 % (ref 50–65)
PARAINFLUENZA VIRUS 1 BY PCR: NOT DETECTED
PARAINFLUENZA VIRUS 2 BY PCR: NOT DETECTED
PARAINFLUENZA VIRUS 3 BY PCR: NOT DETECTED
PARAINFLUENZA VIRUS 4 BY PCR: NOT DETECTED
PDW BLD-RTO: 15.1 % (ref 11.5–14.5)
PLATELET # BLD: 81 K/UL (ref 130–400)
PMV BLD AUTO: 12.3 FL (ref 9.4–12.4)
POTASSIUM REFLEX MAGNESIUM: 3.9 MMOL/L (ref 3.5–5)
PRO-BNP: 36 PG/ML (ref 0–900)
PROTHROMBIN TIME: 15.6 SEC (ref 12–14.6)
RBC # BLD: 4.64 M/UL (ref 4.7–6.1)
RESPIRATORY SYNCYTIAL VIRUS BY PCR: NOT DETECTED
SARS-COV-2, PCR: NOT DETECTED
SODIUM BLD-SCNC: 136 MMOL/L (ref 136–145)
TOTAL PROTEIN: 6.6 G/DL (ref 6.6–8.7)
TROPONIN: <0.01 NG/ML (ref 0–0.03)
TROPONIN: <0.01 NG/ML (ref 0–0.03)
WBC # BLD: 8.8 K/UL (ref 4.8–10.8)

## 2020-10-23 PROCEDURE — 96374 THER/PROPH/DIAG INJ IV PUSH: CPT

## 2020-10-23 PROCEDURE — 6370000000 HC RX 637 (ALT 250 FOR IP): Performed by: HOSPITALIST

## 2020-10-23 PROCEDURE — 84484 ASSAY OF TROPONIN QUANT: CPT

## 2020-10-23 PROCEDURE — 4A023N7 MEASUREMENT OF CARDIAC SAMPLING AND PRESSURE, LEFT HEART, PERCUTANEOUS APPROACH: ICD-10-PCS | Performed by: INTERNAL MEDICINE

## 2020-10-23 PROCEDURE — 4A03X51 MEASUREMENT OF ARTERIAL FLOW, PERIPHERAL, EXTERNAL APPROACH: ICD-10-PCS | Performed by: INTERNAL MEDICINE

## 2020-10-23 PROCEDURE — 99223 1ST HOSP IP/OBS HIGH 75: CPT | Performed by: INTERNAL MEDICINE

## 2020-10-23 PROCEDURE — 80053 COMPREHEN METABOLIC PANEL: CPT

## 2020-10-23 PROCEDURE — 0202U NFCT DS 22 TRGT SARS-COV-2: CPT

## 2020-10-23 PROCEDURE — 93923 UPR/LXTR ART STDY 3+ LVLS: CPT

## 2020-10-23 PROCEDURE — 93458 L HRT ARTERY/VENTRICLE ANGIO: CPT

## 2020-10-23 PROCEDURE — B2151ZZ FLUOROSCOPY OF LEFT HEART USING LOW OSMOLAR CONTRAST: ICD-10-PCS | Performed by: INTERNAL MEDICINE

## 2020-10-23 PROCEDURE — 6360000004 HC RX CONTRAST MEDICATION: Performed by: HOSPITALIST

## 2020-10-23 PROCEDURE — 2580000003 HC RX 258: Performed by: INTERNAL MEDICINE

## 2020-10-23 PROCEDURE — 6360000002 HC RX W HCPCS: Performed by: EMERGENCY MEDICINE

## 2020-10-23 PROCEDURE — 75716 ARTERY X-RAYS ARMS/LEGS: CPT

## 2020-10-23 PROCEDURE — 2709999900 HC NON-CHARGEABLE SUPPLY

## 2020-10-23 PROCEDURE — 99284 EMERGENCY DEPT VISIT MOD MDM: CPT

## 2020-10-23 PROCEDURE — 85025 COMPLETE CBC W/AUTO DIFF WBC: CPT

## 2020-10-23 PROCEDURE — 93458 L HRT ARTERY/VENTRICLE ANGIO: CPT | Performed by: INTERNAL MEDICINE

## 2020-10-23 PROCEDURE — 36415 COLL VENOUS BLD VENIPUNCTURE: CPT

## 2020-10-23 PROCEDURE — 99152 MOD SED SAME PHYS/QHP 5/>YRS: CPT

## 2020-10-23 PROCEDURE — 83880 ASSAY OF NATRIURETIC PEPTIDE: CPT

## 2020-10-23 PROCEDURE — 85379 FIBRIN DEGRADATION QUANT: CPT

## 2020-10-23 PROCEDURE — 6360000002 HC RX W HCPCS

## 2020-10-23 PROCEDURE — 85610 PROTHROMBIN TIME: CPT

## 2020-10-23 PROCEDURE — B4101ZZ FLUOROSCOPY OF ABDOMINAL AORTA USING LOW OSMOLAR CONTRAST: ICD-10-PCS | Performed by: INTERNAL MEDICINE

## 2020-10-23 PROCEDURE — 99999 PR OFFICE/OUTPT VISIT,PROCEDURE ONLY: CPT | Performed by: EMERGENCY MEDICINE

## 2020-10-23 PROCEDURE — 99152 MOD SED SAME PHYS/QHP 5/>YRS: CPT | Performed by: INTERNAL MEDICINE

## 2020-10-23 PROCEDURE — 71045 X-RAY EXAM CHEST 1 VIEW: CPT

## 2020-10-23 PROCEDURE — 93010 ELECTROCARDIOGRAM REPORT: CPT | Performed by: INTERNAL MEDICINE

## 2020-10-23 PROCEDURE — 93005 ELECTROCARDIOGRAM TRACING: CPT | Performed by: EMERGENCY MEDICINE

## 2020-10-23 PROCEDURE — 85730 THROMBOPLASTIN TIME PARTIAL: CPT

## 2020-10-23 PROCEDURE — B2111ZZ FLUOROSCOPY OF MULTIPLE CORONARY ARTERIES USING LOW OSMOLAR CONTRAST: ICD-10-PCS | Performed by: INTERNAL MEDICINE

## 2020-10-23 PROCEDURE — 93306 TTE W/DOPPLER COMPLETE: CPT

## 2020-10-23 PROCEDURE — 94640 AIRWAY INHALATION TREATMENT: CPT

## 2020-10-23 PROCEDURE — 2500000003 HC RX 250 WO HCPCS

## 2020-10-23 PROCEDURE — C1769 GUIDE WIRE: HCPCS

## 2020-10-23 PROCEDURE — C1894 INTRO/SHEATH, NON-LASER: HCPCS

## 2020-10-23 PROCEDURE — 2140000000 HC CCU INTERMEDIATE R&B

## 2020-10-23 RX ORDER — ASCORBIC ACID 500 MG
1000 TABLET ORAL DAILY
Status: DISCONTINUED | OUTPATIENT
Start: 2020-10-23 | End: 2020-10-23 | Stop reason: HOSPADM

## 2020-10-23 RX ORDER — PROMETHAZINE HYDROCHLORIDE 25 MG/1
12.5 TABLET ORAL EVERY 6 HOURS PRN
Status: DISCONTINUED | OUTPATIENT
Start: 2020-10-23 | End: 2020-10-23 | Stop reason: HOSPADM

## 2020-10-23 RX ORDER — SPIRONOLACTONE 50 MG/1
50 TABLET, FILM COATED ORAL DAILY
Status: DISCONTINUED | OUTPATIENT
Start: 2020-10-23 | End: 2020-10-23 | Stop reason: HOSPADM

## 2020-10-23 RX ORDER — ISOSORBIDE MONONITRATE 30 MG/1
30 TABLET, EXTENDED RELEASE ORAL DAILY
Status: DISCONTINUED | OUTPATIENT
Start: 2020-10-23 | End: 2020-10-23 | Stop reason: HOSPADM

## 2020-10-23 RX ORDER — BETAMETHASONE DIPROPIONATE 0.5 MG/G
CREAM TOPICAL 2 TIMES DAILY
Status: DISCONTINUED | OUTPATIENT
Start: 2020-10-23 | End: 2020-10-23 | Stop reason: HOSPADM

## 2020-10-23 RX ORDER — ACETAMINOPHEN 325 MG/1
650 TABLET ORAL EVERY 4 HOURS PRN
Status: DISCONTINUED | OUTPATIENT
Start: 2020-10-23 | End: 2020-10-23 | Stop reason: SDUPTHER

## 2020-10-23 RX ORDER — FOLIC ACID 1 MG/1
1 TABLET ORAL DAILY
Status: DISCONTINUED | OUTPATIENT
Start: 2020-10-23 | End: 2020-10-23 | Stop reason: HOSPADM

## 2020-10-23 RX ORDER — ONDANSETRON 2 MG/ML
4 INJECTION INTRAMUSCULAR; INTRAVENOUS EVERY 6 HOURS PRN
Status: DISCONTINUED | OUTPATIENT
Start: 2020-10-23 | End: 2020-10-23 | Stop reason: SDUPTHER

## 2020-10-23 RX ORDER — PANTOPRAZOLE SODIUM 40 MG/1
40 TABLET, DELAYED RELEASE ORAL
Status: DISCONTINUED | OUTPATIENT
Start: 2020-10-23 | End: 2020-10-23 | Stop reason: HOSPADM

## 2020-10-23 RX ORDER — PNV NO.95/FERROUS FUM/FOLIC AC 28MG-0.8MG
325 TABLET ORAL DAILY
Status: DISCONTINUED | OUTPATIENT
Start: 2020-10-23 | End: 2020-10-23 | Stop reason: CLARIF

## 2020-10-23 RX ORDER — VERAPAMIL HYDROCHLORIDE 120 MG/1
120 CAPSULE, EXTENDED RELEASE ORAL 2 TIMES DAILY
Status: DISCONTINUED | OUTPATIENT
Start: 2020-10-23 | End: 2020-10-23

## 2020-10-23 RX ORDER — LEVOFLOXACIN 750 MG/1
750 TABLET ORAL DAILY
Status: DISCONTINUED | OUTPATIENT
Start: 2020-10-23 | End: 2020-10-23 | Stop reason: HOSPADM

## 2020-10-23 RX ORDER — NITROGLYCERIN 20 MG/100ML
5 INJECTION INTRAVENOUS CONTINUOUS
Status: DISCONTINUED | OUTPATIENT
Start: 2020-10-23 | End: 2020-10-23 | Stop reason: HOSPADM

## 2020-10-23 RX ORDER — ASPIRIN 81 MG/1
81 TABLET ORAL ONCE
Status: DISCONTINUED | OUTPATIENT
Start: 2020-10-23 | End: 2020-10-23 | Stop reason: SDUPTHER

## 2020-10-23 RX ORDER — TIZANIDINE 4 MG/1
4 TABLET ORAL 3 TIMES DAILY
Status: DISCONTINUED | OUTPATIENT
Start: 2020-10-23 | End: 2020-10-23 | Stop reason: HOSPADM

## 2020-10-23 RX ORDER — MORPHINE SULFATE 4 MG/ML
2 INJECTION, SOLUTION INTRAMUSCULAR; INTRAVENOUS ONCE
Status: COMPLETED | OUTPATIENT
Start: 2020-10-23 | End: 2020-10-23

## 2020-10-23 RX ORDER — CHOLECALCIFEROL (VITAMIN D3) 125 MCG
3000 CAPSULE ORAL DAILY
Status: DISCONTINUED | OUTPATIENT
Start: 2020-10-23 | End: 2020-10-23 | Stop reason: HOSPADM

## 2020-10-23 RX ORDER — ONDANSETRON 4 MG/1
4 TABLET, ORALLY DISINTEGRATING ORAL EVERY 4 HOURS PRN
COMMUNITY
End: 2021-01-22 | Stop reason: SDUPTHER

## 2020-10-23 RX ORDER — SODIUM CHLORIDE 0.9 % (FLUSH) 0.9 %
10 SYRINGE (ML) INJECTION EVERY 12 HOURS SCHEDULED
Status: DISCONTINUED | OUTPATIENT
Start: 2020-10-23 | End: 2020-10-23 | Stop reason: SDUPTHER

## 2020-10-23 RX ORDER — ACETAMINOPHEN 650 MG/1
650 SUPPOSITORY RECTAL EVERY 6 HOURS PRN
Status: DISCONTINUED | OUTPATIENT
Start: 2020-10-23 | End: 2020-10-23 | Stop reason: HOSPADM

## 2020-10-23 RX ORDER — SODIUM CHLORIDE 0.9 % (FLUSH) 0.9 %
10 SYRINGE (ML) INJECTION EVERY 12 HOURS SCHEDULED
Status: DISCONTINUED | OUTPATIENT
Start: 2020-10-23 | End: 2020-10-23 | Stop reason: HOSPADM

## 2020-10-23 RX ORDER — NITROGLYCERIN 0.4 MG/1
0.4 TABLET SUBLINGUAL EVERY 5 MIN PRN
Status: DISCONTINUED | OUTPATIENT
Start: 2020-10-23 | End: 2020-10-23 | Stop reason: HOSPADM

## 2020-10-23 RX ORDER — SODIUM CHLORIDE 0.9 % (FLUSH) 0.9 %
10 SYRINGE (ML) INJECTION PRN
Status: DISCONTINUED | OUTPATIENT
Start: 2020-10-23 | End: 2020-10-23 | Stop reason: HOSPADM

## 2020-10-23 RX ORDER — ATORVASTATIN CALCIUM 40 MG/1
40 TABLET, FILM COATED ORAL NIGHTLY
Status: DISCONTINUED | OUTPATIENT
Start: 2020-10-23 | End: 2020-10-23 | Stop reason: HOSPADM

## 2020-10-23 RX ORDER — VITAMIN E 268 MG
400 CAPSULE ORAL DAILY
Status: DISCONTINUED | OUTPATIENT
Start: 2020-10-23 | End: 2020-10-23 | Stop reason: HOSPADM

## 2020-10-23 RX ORDER — SODIUM CHLORIDE 0.9 % (FLUSH) 0.9 %
10 SYRINGE (ML) INJECTION PRN
Status: DISCONTINUED | OUTPATIENT
Start: 2020-10-23 | End: 2020-10-23 | Stop reason: SDUPTHER

## 2020-10-23 RX ORDER — HYDRALAZINE HYDROCHLORIDE 20 MG/ML
10 INJECTION INTRAMUSCULAR; INTRAVENOUS EVERY 10 MIN PRN
Status: DISCONTINUED | OUTPATIENT
Start: 2020-10-23 | End: 2020-10-23 | Stop reason: HOSPADM

## 2020-10-23 RX ORDER — POTASSIUM CHLORIDE 20 MEQ/1
40 TABLET, EXTENDED RELEASE ORAL PRN
Status: DISCONTINUED | OUTPATIENT
Start: 2020-10-23 | End: 2020-10-23 | Stop reason: HOSPADM

## 2020-10-23 RX ORDER — VERAPAMIL HYDROCHLORIDE 240 MG/1
120 TABLET, FILM COATED, EXTENDED RELEASE ORAL 2 TIMES DAILY
Status: DISCONTINUED | OUTPATIENT
Start: 2020-10-23 | End: 2020-10-23 | Stop reason: HOSPADM

## 2020-10-23 RX ORDER — SODIUM CHLORIDE 9 MG/ML
INJECTION, SOLUTION INTRAVENOUS CONTINUOUS
Status: DISCONTINUED | OUTPATIENT
Start: 2020-10-23 | End: 2020-10-23 | Stop reason: SDUPTHER

## 2020-10-23 RX ORDER — ACETAMINOPHEN 325 MG/1
650 TABLET ORAL EVERY 6 HOURS PRN
Status: DISCONTINUED | OUTPATIENT
Start: 2020-10-23 | End: 2020-10-23 | Stop reason: HOSPADM

## 2020-10-23 RX ORDER — POTASSIUM CHLORIDE 7.45 MG/ML
10 INJECTION INTRAVENOUS PRN
Status: DISCONTINUED | OUTPATIENT
Start: 2020-10-23 | End: 2020-10-23 | Stop reason: HOSPADM

## 2020-10-23 RX ORDER — ONDANSETRON 2 MG/ML
4 INJECTION INTRAMUSCULAR; INTRAVENOUS EVERY 6 HOURS PRN
Status: DISCONTINUED | OUTPATIENT
Start: 2020-10-23 | End: 2020-10-23 | Stop reason: HOSPADM

## 2020-10-23 RX ORDER — FUROSEMIDE 20 MG/1
20 TABLET ORAL DAILY
Status: DISCONTINUED | OUTPATIENT
Start: 2020-10-23 | End: 2020-10-23 | Stop reason: HOSPADM

## 2020-10-23 RX ORDER — IPRATROPIUM BROMIDE AND ALBUTEROL SULFATE 2.5; .5 MG/3ML; MG/3ML
1 SOLUTION RESPIRATORY (INHALATION) ONCE
Status: COMPLETED | OUTPATIENT
Start: 2020-10-23 | End: 2020-10-23

## 2020-10-23 RX ORDER — LORAZEPAM 0.5 MG/1
0.5 TABLET ORAL ONCE
Status: DISCONTINUED | OUTPATIENT
Start: 2020-10-23 | End: 2020-10-23 | Stop reason: HOSPADM

## 2020-10-23 RX ORDER — SODIUM CHLORIDE 9 MG/ML
1000 INJECTION, SOLUTION INTRAVENOUS CONTINUOUS
Status: DISCONTINUED | OUTPATIENT
Start: 2020-10-23 | End: 2020-10-23 | Stop reason: HOSPADM

## 2020-10-23 RX ORDER — VITAMIN B COMPLEX
1000 TABLET ORAL DAILY
Status: DISCONTINUED | OUTPATIENT
Start: 2020-10-23 | End: 2020-10-23 | Stop reason: HOSPADM

## 2020-10-23 RX ORDER — POLYETHYLENE GLYCOL 3350 17 G/17G
17 POWDER, FOR SOLUTION ORAL DAILY PRN
Status: DISCONTINUED | OUTPATIENT
Start: 2020-10-23 | End: 2020-10-23 | Stop reason: HOSPADM

## 2020-10-23 RX ORDER — OXYCODONE AND ACETAMINOPHEN 10; 325 MG/1; MG/1
1 TABLET ORAL EVERY 6 HOURS PRN
Status: DISCONTINUED | OUTPATIENT
Start: 2020-10-23 | End: 2020-10-23 | Stop reason: HOSPADM

## 2020-10-23 RX ORDER — MAGNESIUM SULFATE 1 G/100ML
1 INJECTION INTRAVENOUS PRN
Status: DISCONTINUED | OUTPATIENT
Start: 2020-10-23 | End: 2020-10-23 | Stop reason: HOSPADM

## 2020-10-23 RX ORDER — BETAMETHASONE DIPROPIONATE 0.5 MG/G
LOTION TOPICAL 2 TIMES DAILY
Status: DISCONTINUED | OUTPATIENT
Start: 2020-10-23 | End: 2020-10-23 | Stop reason: CLARIF

## 2020-10-23 RX ORDER — CYANOCOBALAMIN (VITAMIN B-12) 500 MCG
450 LOZENGE ORAL DAILY
Status: DISCONTINUED | OUTPATIENT
Start: 2020-10-23 | End: 2020-10-23 | Stop reason: CLARIF

## 2020-10-23 RX ORDER — FERROUS SULFATE 325(65) MG
325 TABLET ORAL
Status: DISCONTINUED | OUTPATIENT
Start: 2020-10-23 | End: 2020-10-23 | Stop reason: HOSPADM

## 2020-10-23 RX ORDER — ASPIRIN 81 MG/1
81 TABLET, CHEWABLE ORAL DAILY
Status: DISCONTINUED | OUTPATIENT
Start: 2020-10-24 | End: 2020-10-23 | Stop reason: HOSPADM

## 2020-10-23 RX ADMIN — LEVOFLOXACIN 750 MG: 750 TABLET, FILM COATED ORAL at 13:01

## 2020-10-23 RX ADMIN — IOPAMIDOL 130 ML: 612 INJECTION, SOLUTION INTRAVENOUS at 11:49

## 2020-10-23 RX ADMIN — IPRATROPIUM BROMIDE AND ALBUTEROL SULFATE 1 AMPULE: .5; 3 SOLUTION RESPIRATORY (INHALATION) at 18:45

## 2020-10-23 RX ADMIN — MORPHINE SULFATE 2 MG: 4 INJECTION, SOLUTION INTRAMUSCULAR; INTRAVENOUS at 08:03

## 2020-10-23 RX ADMIN — VERAPAMIL HYDROCHLORIDE 120 MG: 240 TABLET, FILM COATED, EXTENDED RELEASE ORAL at 13:01

## 2020-10-23 RX ADMIN — Medication 400 UNITS: at 13:01

## 2020-10-23 RX ADMIN — OXYCODONE HYDROCHLORIDE AND ACETAMINOPHEN 1000 MG: 500 TABLET ORAL at 13:01

## 2020-10-23 RX ADMIN — FUROSEMIDE 20 MG: 20 TABLET ORAL at 13:01

## 2020-10-23 RX ADMIN — FOLIC ACID 1 MG: 1 TABLET ORAL at 13:01

## 2020-10-23 RX ADMIN — TIZANIDINE 4 MG: 4 TABLET ORAL at 13:01

## 2020-10-23 RX ADMIN — OXYCODONE HYDROCHLORIDE AND ACETAMINOPHEN 1 TABLET: 10; 325 TABLET ORAL at 14:23

## 2020-10-23 RX ADMIN — SODIUM CHLORIDE 1000 ML: 9 INJECTION, SOLUTION INTRAVENOUS at 12:15

## 2020-10-23 RX ADMIN — SPIRONOLACTONE 50 MG: 50 TABLET ORAL at 13:02

## 2020-10-23 ASSESSMENT — ENCOUNTER SYMPTOMS
VOMITING: 0
EYES NEGATIVE: 1
NAUSEA: 0
SHORTNESS OF BREATH: 0
DIARRHEA: 0
ABDOMINAL PAIN: 0
SHORTNESS OF BREATH: 1
GASTROINTESTINAL NEGATIVE: 1
COUGH: 1
EYE PAIN: 0
RESPIRATORY NEGATIVE: 1

## 2020-10-23 ASSESSMENT — PAIN SCALES - GENERAL
PAINLEVEL_OUTOF10: 10
PAINLEVEL_OUTOF10: 7
PAINLEVEL_OUTOF10: 8

## 2020-10-23 ASSESSMENT — PAIN DESCRIPTION - ORIENTATION: ORIENTATION: LEFT

## 2020-10-23 ASSESSMENT — PAIN DESCRIPTION - LOCATION: LOCATION: SHOULDER

## 2020-10-23 NOTE — PROGRESS NOTES
Vascular Preliminary Report      Peripheral Arterial Pleth Exam: Bilateral Lower Extremities    Peripheral Vascular Screening   Ankle Brachial Index (PRAKASH)    Right (PT) PRAKASH: 1.18  Right (DP) PRAKASH: 1.21  Right TBI: 0.94    Left (PT) PRAKASH: 1.09  Left (DP) PRAKASH: 1.06  Left TBI: 0.84    Final Report Pending

## 2020-10-23 NOTE — ED TRIAGE NOTES
Pt was in SVT as soon as EMS arrived and got him on the rig EMS states that he converted back to sinus rhythm.  EMS gave 324mg of aspirin and a total of 3 nitros in route to hospital.
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
## 2020-10-23 NOTE — PROGRESS NOTES
Cardiac cath preliminary note right common femoral artery retrograde approach left ventricular function normal coronary angiograms normal abdominal pelvic angiogram normal

## 2020-10-23 NOTE — DISCHARGE INSTR - ACTIVITY
No lifting >10lbs for 2 weeks. No riding horses/atv/tractors/lawnmowers/bikes    Do not take a bath, may shower in 48hrs and remove drsg.  No powders/lotions/sprays to inc site    Report temp >100.5,drainage,lg amt of swelling,uncontrolled pain in groin

## 2020-10-23 NOTE — CARE COORDINATION
Spoke with pt who states does not have dc transportation. Pt reports resides home alone and all his family is OOS. Pt has disability income that is over the limit for medicaid services and has a vehicle in his name which would disqualify him for public transit services. Pt reports ability to drive himself, but pt had heart cath today (with negative results) and unable to drive himself at dc. Pt states has contacted his PCP office and one of the staff is contacting a [employer] member that may be willing to assist the pt home upon dc. SW requested the pt notify her either way if needing transportation or not. Pt denies any in home services pta and does not require use/need of DME pta. Pt is not requiring O2 at this time. Pt reports having medicare for coverage of services and meds and affordable copays. Pt denies further dc needs at this time.
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
## 2020-10-23 NOTE — PROGRESS NOTES
Pt informed that he does albuterol and trelogy at home. Put on home med list and  txt pt will be d/c at some point pt 92-94% on ra.  tgreeno rn

## 2020-10-23 NOTE — PROGRESS NOTES
Apparently pt told soc service that someone from [facility] would come get her. But he just informed me that they never called him back. I called supervisor to get cab voucher per soc service.  tgreeno rn

## 2020-10-24 NOTE — DISCHARGE SUMMARY
Danielle Matthews, 53 [facility] MEDICINE        Please see the H&P from earlier today. Please note this is Short Stay Summary evaluation for this patient. .. which means it is OBS admission and OBS discharge. .. both rolled into one document.        SHORT  STAY  SUMMARY:            Sierra Monsivais MD  10/23/2020, 11:39 PM

## 2020-10-24 NOTE — PROGRESS NOTES
Pt advises he has no one to drive him home. He is unable to produce any names or numbers for the staff to contact. He does have $90 in cash. [facility] contacted and advised it would be $50 for the ride to his residence. Pt's IV and monitor D/C'd . Pt discharged via w/c to the ER waiting room to wait for cab.

## 2020-10-26 ENCOUNTER — OFFICE VISIT (OUTPATIENT)
Dept: NEUROLOGY | Age: 60
End: 2020-10-26
Payer: MEDICARE

## 2020-10-26 VITALS
SYSTOLIC BLOOD PRESSURE: 138 MMHG | RESPIRATION RATE: 20 BRPM | HEART RATE: 66 BPM | BODY MASS INDEX: 34.51 KG/M2 | WEIGHT: 233 LBS | HEIGHT: 69 IN | DIASTOLIC BLOOD PRESSURE: 65 MMHG

## 2020-10-26 PROCEDURE — 1036F TOBACCO NON-USER: CPT | Performed by: PSYCHIATRY & NEUROLOGY

## 2020-10-26 PROCEDURE — 99204 OFFICE O/P NEW MOD 45 MIN: CPT | Performed by: PSYCHIATRY & NEUROLOGY

## 2020-10-26 PROCEDURE — 1111F DSCHRG MED/CURRENT MED MERGE: CPT | Performed by: PSYCHIATRY & NEUROLOGY

## 2020-10-26 PROCEDURE — G8484 FLU IMMUNIZE NO ADMIN: HCPCS | Performed by: PSYCHIATRY & NEUROLOGY

## 2020-10-26 PROCEDURE — G8417 CALC BMI ABV UP PARAM F/U: HCPCS | Performed by: PSYCHIATRY & NEUROLOGY

## 2020-10-26 PROCEDURE — G8427 DOCREV CUR MEDS BY ELIG CLIN: HCPCS | Performed by: PSYCHIATRY & NEUROLOGY

## 2020-10-26 PROCEDURE — 3017F COLORECTAL CA SCREEN DOC REV: CPT | Performed by: PSYCHIATRY & NEUROLOGY
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
## 2020-10-28 ENCOUNTER — HOSPITAL ENCOUNTER (OUTPATIENT)
Dept: PAIN MANAGEMENT | Age: 60
Discharge: HOME | End: 2020-10-28
Payer: MEDICARE

## 2020-10-28 VITALS
WEIGHT: 236 LBS | SYSTOLIC BLOOD PRESSURE: 148 MMHG | HEIGHT: 69 IN | TEMPERATURE: 98.4 F | OXYGEN SATURATION: 92 % | HEART RATE: 91 BPM | DIASTOLIC BLOOD PRESSURE: 76 MMHG | BODY MASS INDEX: 34.96 KG/M2

## 2020-10-28 PROBLEM — M47.816 FACET HYPERTROPHY OF LUMBAR REGION: Status: ACTIVE | Noted: 2020-10-28

## 2020-10-28 PROBLEM — M79.18 MYOFASCIAL MUSCLE PAIN: Status: ACTIVE | Noted: 2020-10-28

## 2020-10-28 PROBLEM — M54.50 CHRONIC BILATERAL LOW BACK PAIN WITHOUT SCIATICA: Status: ACTIVE | Noted: 2020-10-28

## 2020-10-28 PROBLEM — G89.29 CHRONIC BILATERAL LOW BACK PAIN WITHOUT SCIATICA: Status: ACTIVE | Noted: 2020-10-28

## 2020-10-28 PROBLEM — M62.830 MUSCLE SPASM OF BACK: Status: ACTIVE | Noted: 2020-10-28

## 2020-10-28 PROCEDURE — 99203 OFFICE O/P NEW LOW 30 MIN: CPT | Performed by: NURSE PRACTITIONER

## 2020-10-28 PROCEDURE — 99215 OFFICE O/P EST HI 40 MIN: CPT

## 2020-10-28 ASSESSMENT — PAIN SCALES - GENERAL: PAINLEVEL_OUTOF10: 9

## 2020-10-28 ASSESSMENT — PAIN DESCRIPTION - PAIN TYPE: TYPE: CHRONIC PAIN

## 2020-10-28 ASSESSMENT — PAIN DESCRIPTION - LOCATION: LOCATION: BACK

## 2020-10-28 NOTE — PROGRESS NOTES
Nursing Admission Record    Current Issues / Falls / ER Visits:  New patient referral for chronic back pain. Patient was previously seen by Dr. Herbert Landeros but self discharged due to being unsatisfied with treatment. Injection In the Past? Yes, [facility]    Opioids Prescribed: Percocet 10mg Q6HRS PRN    Was Medication Brought to Appt: N/A    Hx of any Neck/Back Surgeries? None    Is Patient currently taking a blood thinner?  None    MRI exams received in the past 2 years:  MRI Lumbar Spine       When 11/2019                                              Where [facility]       Imaging on chart: Yes         Imaging records requested: No    CT exam received during the last 12 months: No       When na                                              Where na       Imaging on chart: No         Imaging records requested: No    X-ray exam received during the last 12 months: Yes XR Cervical Spine       When 8/10/2020                                              Where [facility]       Imaging on chart: Yes         Imaging records requested: No    Nerve Conduction Study/EMG:  No       When na                                              Where na       Imaging on chart: No         Imaging records requested: No    Physical therapy: Yes       When: 2020                        Where [facility] No       When: na                        Where na    Labs  Yes       When: 10/23/2020                                             Where [facility])    PEG Score: 9    Last PEG Score: N/A    Annual ORT Score: 17    Annual PHQ Score: 7    Last UDS Results: N/A    Education Provided:  [x] Review of Mode  [x] Agreement Review  [x] PEG Score Calculated [x] PHQ Score Calculated [x] ORT Score Calculated    [] Compliance Issues Discussed [] Cognitive Behavior Needs [x] Exercise [] Review of Test [] Financial Issues  [x] Tobacco/Alcohol Use Reviewed [x] Teaching [x] New Patient [] Picture Obtained    Physician Plan:  [] Outgoing Referral  [] Pharmacy Consult  [] Test Ordered [x] Prescription Ordered/Changed [] Blood Thinner Request Form  [] Obtained Test Results / Consult Notes  [] UDS due at next visit, verified per EPIC      [] Suspected Physical Abuse or Suicide Risk assessed - IF YES COMPLETE QUESTIONS BELOW    If any of the following questions are answered yes - contact attending physician for referral:    Has been considering harming self to escape stress, pain problems? [] YES  [] NO  Has a suicide plan? [] YES  [] NO  Has attempted suicide in the past?   [] YES  [] NO  Has a close friend or family member who committed suicide?   [] YES  [] NO    Assessment Completed by:  Electronically signed by Malaika Mejia RN on 10/28/2020 at 10:17 AM
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
## 2020-10-29 ENCOUNTER — VIRTUAL VISIT (OUTPATIENT)
Dept: FAMILY MEDICINE CLINIC | Age: 60
End: 2020-10-29
Payer: MEDICARE

## 2020-10-29 PROCEDURE — 99443 PR PHYS/QHP TELEPHONE EVALUATION 21-30 MIN: CPT | Performed by: FAMILY MEDICINE

## 2020-10-29 NOTE — PROGRESS NOTES
Erwin Vega is a 56 y.o. male evaluated via telephone on 10/29/2020. Consent:  He and/or health care decision maker is aware that that he may receive a bill for this telephone service, depending on his insurance coverage, and has provided verbal consent to proceed: Yes      Documentation:  I communicated with the patient and/or health care decision maker about here for follow-up of multiple medical problems. Patient is a 68-year-old white male. He was complaining of chest pain. He went to get a stress test.  Stress test was positive. He have a heart cath that was essentially unremarkable. He continues to complain of chest pressure. He has acid reflux. He have an appointment to see GI next month. I encouraged him to modify his diet to help with that. He also have some memory problems. He have a. Abnormal [place] test.  He was seen by neurology but I do not see any evaluation for dementia. He also have chronic pain. He was referred to pain management. He was seen by pain management. I explained to him that he need to follow-up with them to be able to manage his chronic pain. Details of this discussion including any medical advice provided: Records from [address] [place] [address] reviewed by me today. Encouraged acid reflux medication use and diet modifications. Follow-up with GI. Also will need to follow-up with neurology. Will reach out to neurology about evaluation of dementia. He will continue to follow-up with pain management. I affirm this is a Patient Initiated Episode with a Patient who has not had a related appointment within my department in the past 7 days or scheduled within the next 24 hours.     Patient identification was verified at the start of the visit: Yes    Total Time: minutes: 21-30 minutes    Note: not billable if this call serves to triage the patient into an appointment for the relevant concern      Dain Spears

## 2020-10-30 ENCOUNTER — TELEPHONE (OUTPATIENT)
Dept: FAMILY MEDICINE CLINIC | Age: 60
End: 2020-10-30

## 2020-10-30 ENCOUNTER — TELEPHONE (OUTPATIENT)
Dept: NEUROLOGY | Age: 60
End: 2020-10-30

## 2020-10-30 RX ORDER — OXYCODONE HYDROCHLORIDE 5 MG/1
5 TABLET ORAL EVERY 6 HOURS PRN
Qty: 120 TABLET | Refills: 0 | Status: SHIPPED | OUTPATIENT
Start: 2020-10-30 | End: 2020-11-30 | Stop reason: SDUPTHER

## 2020-10-30 NOTE — TELEPHONE ENCOUNTER
[facility]. The best time to reach him is Anytime. Pt called to see if Liborio Paiz has talked to the Neurologist. Please call pt back. Thank you.

## 2020-10-30 NOTE — PROGRESS NOTES
Spoke with patient, Dr. Golden Alvarado spoke to . I explained that to patient and he seemed to understand.

## 2020-10-30 NOTE — TELEPHONE ENCOUNTER
Spoke with patient, Dr. Heidi Vazquez spoke to . I explained that to patient and he seemed to understand.

## 2020-11-02 ENCOUNTER — TELEPHONE (OUTPATIENT)
Dept: PAIN MANAGEMENT | Age: 60
End: 2020-11-02

## 2020-11-02 NOTE — TELEPHONE ENCOUNTER
Patient called wanting to discuss his pain medication. He stated that the medication he was previously on was discontinued due to having Tylenol in it and he cannot take it due to his liver. He was put on Oxy IR 5mg and says that it is \"like a placebo\" it is not doing anything. He would like a medication change. I let the patient know that Cecily Arndt does not make changes to medications over the phone. I let him know that he does have two procedure appointments coming up in November and then an office f/u appointment on 1/5/21. Patient was very unhappy and said he cannot wait that long. I explained to him that there are no available office appointments any sooner than that at this time. I let him know that he could contact the third floor scheduling line to see if there have been any cancellations. Patient hung up before the any further information could be exchanged.

## 2020-11-05 ENCOUNTER — TELEPHONE (OUTPATIENT)
Dept: PAIN MANAGEMENT | Age: 60
End: 2020-11-05

## 2020-11-05 NOTE — TELEPHONE ENCOUNTER
Patient had placed second call to third floor scheduling line asking for an appointment to come in to the office. Call was transferred to nurse line. I called the patient back and let him know that Melvina cannot change his medication until his scheduled appointment. He let me know that he is trying to get his pain under control, and that the other day he was drinking \"beer\" to help his pain. He stated that he had taken all his Oxy IR doses for the day, at one time. I cautioned him that he cannot be drinking alcohol and taking his pain medication and that Melvina cannot continue to prescribe him medication if he is drinking. Patient said he understood, but that he really is just trying to ease his pain.

## 2020-11-12 ENCOUNTER — TELEPHONE (OUTPATIENT)
Dept: FAMILY MEDICINE CLINIC | Age: 60
End: 2020-11-12

## 2020-11-12 ENCOUNTER — OFFICE VISIT (OUTPATIENT)
Dept: CARDIOLOGY | Age: 60
End: 2020-11-12
Payer: MEDICARE

## 2020-11-12 ENCOUNTER — HOSPITAL ENCOUNTER (OUTPATIENT)
Dept: PAIN MANAGEMENT | Age: 60
Discharge: HOME | End: 2020-11-12
Payer: MEDICARE

## 2020-11-12 VITALS
WEIGHT: 237 LBS | BODY MASS INDEX: 35.1 KG/M2 | DIASTOLIC BLOOD PRESSURE: 82 MMHG | HEIGHT: 69 IN | HEART RATE: 73 BPM | SYSTOLIC BLOOD PRESSURE: 132 MMHG

## 2020-11-12 VITALS
DIASTOLIC BLOOD PRESSURE: 62 MMHG | HEART RATE: 83 BPM | RESPIRATION RATE: 20 BRPM | SYSTOLIC BLOOD PRESSURE: 140 MMHG | OXYGEN SATURATION: 94 % | TEMPERATURE: 98.8 F

## 2020-11-12 PROBLEM — R00.0 RACING HEART BEAT: Status: ACTIVE | Noted: 2020-11-12

## 2020-11-12 PROBLEM — I51.89 GRADE I DIASTOLIC DYSFUNCTION: Status: ACTIVE | Noted: 2020-11-12

## 2020-11-12 PROBLEM — R00.2 PALPITATIONS: Status: ACTIVE | Noted: 2020-11-12

## 2020-11-12 PROCEDURE — 20553 NJX 1/MLT TRIGGER POINTS 3/>: CPT | Performed by: NURSE PRACTITIONER

## 2020-11-12 PROCEDURE — 93000 ELECTROCARDIOGRAM COMPLETE: CPT | Performed by: NURSE PRACTITIONER

## 2020-11-12 PROCEDURE — 1036F TOBACCO NON-USER: CPT | Performed by: NURSE PRACTITIONER

## 2020-11-12 PROCEDURE — 0296T PR EXT ECG > 48HR TO 21 DAY RCRD W/CONECT INTL RCRD: CPT | Performed by: NURSE PRACTITIONER

## 2020-11-12 PROCEDURE — 1111F DSCHRG MED/CURRENT MED MERGE: CPT | Performed by: NURSE PRACTITIONER

## 2020-11-12 PROCEDURE — G8484 FLU IMMUNIZE NO ADMIN: HCPCS | Performed by: NURSE PRACTITIONER

## 2020-11-12 PROCEDURE — 99213 OFFICE O/P EST LOW 20 MIN: CPT | Performed by: NURSE PRACTITIONER

## 2020-11-12 PROCEDURE — 20553 NJX 1/MLT TRIGGER POINTS 3/>: CPT

## 2020-11-12 PROCEDURE — 2500000003 HC RX 250 WO HCPCS

## 2020-11-12 PROCEDURE — 3017F COLORECTAL CA SCREEN DOC REV: CPT | Performed by: NURSE PRACTITIONER

## 2020-11-12 PROCEDURE — G8417 CALC BMI ABV UP PARAM F/U: HCPCS | Performed by: NURSE PRACTITIONER

## 2020-11-12 PROCEDURE — G8427 DOCREV CUR MEDS BY ELIG CLIN: HCPCS | Performed by: NURSE PRACTITIONER

## 2020-11-12 RX ORDER — LIDOCAINE HYDROCHLORIDE 10 MG/ML
10 INJECTION, SOLUTION EPIDURAL; INFILTRATION; INTRACAUDAL; PERINEURAL ONCE
Status: DISCONTINUED | OUTPATIENT
Start: 2020-11-12 | End: 2020-11-14 | Stop reason: HOSPADM

## 2020-11-12 RX ORDER — BUPIVACAINE HYDROCHLORIDE 5 MG/ML
10 INJECTION, SOLUTION EPIDURAL; INTRACAUDAL ONCE
Status: DISCONTINUED | OUTPATIENT
Start: 2020-11-12 | End: 2020-11-14 | Stop reason: HOSPADM
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
## 2020-11-12 NOTE — PATIENT INSTRUCTIONS
New instructions for today: The Zio cardiac monitor needs to stay on for 2 weeks. Use the symptom marker as instructed. Mail back monitor in box provided. Patient Instructions:  Continue current medications as prescribed. Always keep a current medication list. Bring your medications to every office visit. Continue to follow up with primary care provider for non cardiac medical problems. Call the office with any problems, questions or concerns at [phone]. If you have been asked to keep a blood pressure log, do so for 2 weeks. Call the office to report readings to the triage nurse at [phone]. Follow up with cardiologist as scheduled. The following educational material has been included in this after visit summary for your review: Life [employer] 7. Heart health. Life simple 7  1) Manage blood pressure - high blood pressure is a major risk factor for heart disease and stroke. Keeping blood pressure in health range reduces strain on your heart, arteries and kidneys. Blood pressure goal is less than 130/80. 2) Control cholesterol - contributes to plaque, which can clog arteries and lead to heart disease and stroke. When you control your cholesterol you are giving your arteries their best chance to remain clear. It is recommended that you get cholesterol lab work done once a year. 3) Reduce blood sugar - most of the food we eat is turning into glucose or blood sugar that our body uses for energy. Over time, high levels of blood sugar can damage your heart, kidneys, eyes and nerves. 4) Get active - living an active life is one of the most rewarding gifts you can give yourself and those you love. Simply put, daily physical activity increases your length and quality of life. Strive to exercise 15 minutes most days of the week. 5)  Eat better - A healthy diet is one of your best weapons for fighting cardiovascular disease.   When you eat a heart healthy diet, you improve your chances for risk of heart disease by raising cholesterol levels. · Limit the amount of solid fat-butter, margarine, and shortening-you eat. Use olive, peanut, or canola oil when you cook. Bake, broil, and steam foods instead of frying them. · Eat a variety of fruit and vegetables every day. Dark green, deep orange, red, or yellow fruits and vegetables are especially good for you. Examples include spinach, carrots, peaches, and berries. · Foods high in fiber can reduce your cholesterol and provide important vitamins and minerals. High-fiber foods include whole-grain cereals and breads, oatmeal, beans, brown rice, citrus fruits, and apples. · Eat lean proteins. Heart-healthy proteins include seafood, lean meats and poultry, eggs, beans, peas, nuts, seeds, and soy products. · Limit drinks and foods with added sugar. These include candy, desserts, and soda pop. Lifestyle changes  · If your doctor recommends it, get more exercise. Walking is a good choice. Bit by bit, increase the amount you walk every day. Try for at least 30 minutes on most days of the week. You also may want to swim, bike, or do other activities. · Do not smoke. If you need help quitting, talk to your doctor about stop-smoking programs and medicines. These can increase your chances of quitting for good. Quitting smoking may be the most important step you can take to protect your heart. It is never too late to quit. · Limit alcohol to 2 drinks a day for men and 1 drink a day for women. Too much alcohol can cause health problems. · Manage other health problems such as diabetes, high blood pressure, and high cholesterol. If you think you may have a problem with alcohol or drug use, talk to your doctor. Medicines  · Take your medicines exactly as prescribed. Call your doctor if you think you are having a problem with your medicine. · If your doctor recommends aspirin, take the amount directed each day.  Make sure you take aspirin and not another kind of

## 2020-11-12 NOTE — PROCEDURES
[facility]      Patient Name: Stephanie Read    : 1960                    Age: 56 y.o. Sex: male    Date: 2020    Pre-op Diagnosis: Myofascial Pain/ Muscle Spasms    Post-op Diagnosis: Myofascial Pain/ Muscle Spasms    Procedure: Lumbar Trigger Point Injections    Performing Procedure: Fausto Cage, MSN, APRN, FNP-C    Previously Had Procedure:  [] Yes    [x] No    Patient Vitals for the past 24 hrs:   BP Temp Temp src Pulse Resp SpO2   20 0954 (!) 140/62 98.8 °F (37.1 °C) Temporal 83 20 94 %       Description of Procedure:    After a brief physical assessment and failure to improve with conservative measures the patient presented for Lumbar Trigger Point Injections. The indications, limitations and possible complications were discussed with the patient and the patient elected to proceed with the procedure. After voluntary, informed and signed consent obtained the patient was placed in a   [x] Sitting  []  Prone position     Appropriate time out was obtained per policy. The area of maximal tenderness was palpated over the  [] Right   [] Left   [x] Bilateral Lower  [x] Latissimus  [x] Erector Spinae   [x] Lumbar Paraspinous. The skin overlying these areas was marked with a skin marker. The skin overlying the proposed injection sites were then sprayed with Gebauer's Solution and prepped in a sterile fashion with Prevantics swab. Each trigger point of the  [] Right   [] Left   [x] Bilateral Lower  [x] Latissimus  [x] Erector Spinae   [x] Lumbar Paraspinous  was then injected after negative aspiration using a 25 gauge 1 1/2 in needle with approximately 2 ml of a solution of     [x] 5 ml of 1% Lidocaine Plain and 5 ml of 0.5% Marcaine Plain per 10 ml syringe  [] Toradol 0.5 ml (30 mg/ml) per 10 ml syringe    Sterile dressings applied. Discharge: The patient tolerated the procedure well.  There were no complications during the procedure and the patient was discharged home with discharge instructions. The patient has been instructed to contact the office should there be any complications or questions to arise between today and their next appointment.     Plan:  [x] Will return to the office in  [] 1 month  [x]  4 - 6 weeks  [] 2 months   [] 3 months for:  [] Planned Procedure [x] Procedure Follow-up  [] Office Visit      [facility], 11/12/2020 at 10:23 AM

## 2020-11-12 NOTE — PROGRESS NOTES
Procedure:  Level of Consciousness: [x]Alert [x]Oriented []Disoriented []Lethargic  Anxiety Level: [x]Calm []Anxious []Depressed []Other  Skin: [x]Warm [x]Dry []Cool []Moist []Intact []Other  Cardiovascular: [x]Palpitations: [x]Never []Occasionally []Frequently  Chest Pain: [x]No []Yes  Respiratory:  [x]Unlabored []Labored []Cough ([] Productive []Unproductive)  HCG Required: [x]No []Yes   Results: []Negative []Positive  Knowledge Level:        [x]Patient/Other verbalized understanding of pre-procedure instructions. [x]Assessment of post-op care needs (transportation, responsible caregiver)        [x]Able to discuss health care problems and how to deal with it. Factors that Affect Teaching:        Language Barrier: [x]No []Yes - why:        Hearing Loss:        [x]No []Yes            Corrective Device:  []Yes []No        Vision Loss:           []No [x]Yes            Corrective Device:  [x]Yes []No        Memory Loss:       [x]No []Yes            []Short Term []Long Term  Motivational Level:  [x]Asks Questions                  []Extremely Anxious       [x]Seems Interested               []Seems Uninterested                  [x]Denies need for Education  Risk for Injury:  [x]Patient oriented to person, place and time  []History of frequent falls/loss of balance  Nutritional:  []Change in appetite   []Weight Gain   []Weight Loss  Functional:  []Requires assistance with ADL's      Botox Assessment  [] Patient  has had a reduction of at least 100 hours (5 Days) in Migraines since receiving Botox  []  []  []  Factors that Affect Teaching:  Assessment of post-op care needs (transportation, responsible caregiver)        []Able to discuss health care problems and how to deal with it.   Factors that Affect Teaching:

## 2020-11-16 ENCOUNTER — OFFICE VISIT (OUTPATIENT)
Dept: FAMILY MEDICINE CLINIC | Age: 60
End: 2020-11-16
Payer: MEDICARE

## 2020-11-16 VITALS
HEART RATE: 66 BPM | WEIGHT: 240 LBS | SYSTOLIC BLOOD PRESSURE: 134 MMHG | HEIGHT: 69 IN | OXYGEN SATURATION: 97 % | DIASTOLIC BLOOD PRESSURE: 66 MMHG | TEMPERATURE: 98.6 F | BODY MASS INDEX: 35.55 KG/M2

## 2020-11-16 PROCEDURE — 1036F TOBACCO NON-USER: CPT | Performed by: FAMILY MEDICINE

## 2020-11-16 PROCEDURE — G8417 CALC BMI ABV UP PARAM F/U: HCPCS | Performed by: FAMILY MEDICINE

## 2020-11-16 PROCEDURE — G8484 FLU IMMUNIZE NO ADMIN: HCPCS | Performed by: FAMILY MEDICINE

## 2020-11-16 PROCEDURE — G8427 DOCREV CUR MEDS BY ELIG CLIN: HCPCS | Performed by: FAMILY MEDICINE

## 2020-11-16 PROCEDURE — 3017F COLORECTAL CA SCREEN DOC REV: CPT | Performed by: FAMILY MEDICINE

## 2020-11-16 PROCEDURE — 99213 OFFICE O/P EST LOW 20 MIN: CPT | Performed by: FAMILY MEDICINE

## 2020-11-16 PROCEDURE — 1111F DSCHRG MED/CURRENT MED MERGE: CPT | Performed by: FAMILY MEDICINE

## 2020-11-16 ASSESSMENT — ENCOUNTER SYMPTOMS
EYES NEGATIVE: 1
GASTROINTESTINAL NEGATIVE: 1
BACK PAIN: 1
ALLERGIC/IMMUNOLOGIC NEGATIVE: 1
RESPIRATORY NEGATIVE: 1

## 2020-11-16 NOTE — PROGRESS NOTES
SUBJECTIVE:    Patient ID: Erwin Vega is a 56 y.o.male. HPI:   Patient here for evaluation for a lift chair. Patient is a 49-year-old white male. He is morbid obese and have chronic osteoarthritis of the hips and knees and chronic low back pain. He see pain management. He is more difficult for him to be able to stand up on his own. He would like to have a prescription for a lift chair to help him with standing. He lives alone. Past Medical History:   Diagnosis Date    Chronic back pain     Cirrhosis ([address]     COPD (chronic obstructive pulmonary disease) (HCC)     GERD (gastroesophageal reflux disease)     Liver disease       Current Outpatient Medications   Medication Sig Dispense Refill    Lift Chair MISC by Does not apply route 1 each 0    oxyCODONE (ROXICODONE) 5 MG immediate release tablet Take 1 tablet by mouth every 6 hours as needed for Pain for up to 30 days. May fill 10/30/2020 120 tablet 0    ondansetron (ZOFRAN-ODT) 4 MG disintegrating tablet Take 4 mg by mouth every 4 hours as needed for Nausea or Vomiting      albuterol sulfate (PROAIR RESPICLICK) 657 (90 Base) MCG/ACT aerosol powder inhalation Inhale 2 puffs into the lungs every 4 hours as needed for Wheezing or Shortness of Breath      Fluticasone-Umeclidin-Vilant (TRELEGY ELLIPTA IN) Inhale 1 puff into the lungs daily      isosorbide mononitrate (IMDUR) 30 MG extended release tablet Take 1 tablet by mouth daily 30 tablet 3    betamethasone dipropionate 0.05 % lotion Apply topically 2 times daily. 1 Bottle 0    nitroGLYCERIN (NITROSTAT) 0.4 MG SL tablet Place 1 tablet under the tongue every 5 minutes as needed for Chest pain up to max of 3 total doses.  If no relief after 1 dose, call 911. 25 tablet 3    tiZANidine (ZANAFLEX) 4 MG tablet TAKE 1 TO 2 TABLETS BY MOUTH EVERY 8 HOURS AS NEEDED 269 tablet 1    folic acid (FOLVITE) 1 MG tablet Take 1 tablet by mouth daily 30 tablet 5    traZODone (DESYREL) 100 MG tablet Take 2 tablets by mouth nightly 180 tablet 1    linaCLOtide (LINZESS) 72 MCG CAPS capsule Take 1 capsule by mouth every morning (before breakfast) Indications: gets samples 30 capsule 5    diclofenac (VOLTAREN) 50 MG EC tablet Take 50 mg by mouth 2 times daily      furosemide (LASIX) 20 MG tablet Take 20 mg by mouth daily      omeprazole (PRILOSEC) 40 MG delayed release capsule Take 40 mg by mouth daily      spironolactone (ALDACTONE) 50 MG tablet Take 50 mg by mouth daily      verapamil (VERELAN) 120 MG extended release capsule Take 120 mg by mouth 2 times daily      Ascorbic Acid (VITAMIN C) 1000 MG tablet Take 1,000 mg by mouth daily      Vitamin E 400 units TABS Take 450 Units by mouth daily      Ferrous Sulfate (IRON) 325 (65 Fe) MG TABS Take 325 mg by mouth daily 30 tablet 5     No current facility-administered medications for this visit. Allergies   Allergen Reactions    Bee Venom        Review of Systems   Constitutional: Negative. HENT: Negative. Eyes: Negative. Respiratory: Negative. Cardiovascular: Negative. Gastrointestinal: Negative. Endocrine: Negative. Genitourinary: Negative. Musculoskeletal: Positive for arthralgias and back pain. Skin: Negative. Allergic/Immunologic: Negative. Neurological: Negative. Hematological: Negative. Psychiatric/Behavioral: Negative. OBJECTIVE:    Physical Exam  Vitals signs reviewed. Constitutional:       Appearance: Normal appearance. He is well-developed. He is obese. HENT:      Head: Normocephalic and atraumatic. Right Ear: Tympanic membrane, ear canal and external ear normal. There is no impacted cerumen. Left Ear: Tympanic membrane, ear canal and external ear normal. There is no impacted cerumen. Nose: Nose normal.      Mouth/Throat:      Lips: Pink. Mouth: Mucous membranes are moist.      Dentition: Normal dentition. Tongue: No lesions. Pharynx: Oropharynx is clear.  Uvula midline. Tonsils: No tonsillar exudate or tonsillar abscesses. Eyes:      General: Lids are normal.         Right eye: No discharge. Left eye: No discharge. Extraocular Movements:      Right eye: Normal extraocular motion. Left eye: Normal extraocular motion. Conjunctiva/sclera: Conjunctivae normal.      Right eye: Right conjunctiva is not injected. Left eye: Left conjunctiva is not injected. Pupils: Pupils are equal, round, and reactive to light. Neck:      Musculoskeletal: Normal range of motion and neck supple. Thyroid: No thyromegaly. Vascular: No carotid bruit or JVD. Cardiovascular:      Rate and Rhythm: Normal rate and regular rhythm. Pulses:           Carotid pulses are 2+ on the right side and 2+ on the left side. Radial pulses are 2+ on the right side and 2+ on the left side. Heart sounds: Normal heart sounds, S1 normal and S2 normal. No murmur. Pulmonary:      Effort: Pulmonary effort is normal. No accessory muscle usage. Breath sounds: Normal breath sounds. Abdominal:      General: Bowel sounds are normal. There is no distension or abdominal bruit. Palpations: Abdomen is soft. There is no mass. Tenderness: There is no abdominal tenderness. Hernia: No hernia is present. Musculoskeletal:      Right hip: He exhibits decreased range of motion. Left hip: He exhibits decreased range of motion. Cervical back: He exhibits decreased range of motion, tenderness, pain and spasm. Lumbar back: He exhibits decreased range of motion, tenderness, pain and spasm. Right lower leg: No edema. Left lower leg: No edema. Lymphadenopathy:      Cervical:      Right cervical: No superficial cervical adenopathy. Left cervical: No superficial cervical adenopathy. Skin:     General: Skin is warm and dry. Coloration: Skin is not jaundiced or pale. Findings: No lesion or rash. Nails:  There is no clubbing. Neurological:      Mental Status: He is alert and oriented to person, place, and time. Cranial Nerves: No facial asymmetry. Motor: No weakness or tremor. Coordination: Coordination normal.      Gait: Gait normal.      Deep Tendon Reflexes: Reflexes are normal and symmetric. Psychiatric:         Attention and Perception: Attention normal.         Mood and Affect: Mood normal.         Speech: Speech normal.         Behavior: Behavior normal.         Thought Content: Thought content normal.         Cognition and Memory: Memory normal.         Judgment: Judgment normal.        /66 (Site: Right Upper Arm, Position: Sitting, Cuff Size: Medium Adult)   Pulse 66   Temp 98.6 °F (37 °C) (Temporal)   Ht 5' 9\" (1.753 m)   Wt 240 lb (108.9 kg)   SpO2 97%   BMI 35.44 kg/m²      ASSESSMENT:     Diagnosis Orders   1. Chronic bilateral low back pain without sciatica  Lift Chair MISC   2. Difficulty standing  Lift Chair MISC        PLAN:    1/2.   Given his chronic  problems I believe that he will benefit from the lipid therapy will recommend for him to get a lift chair

## 2020-11-20 ENCOUNTER — APPOINTMENT (OUTPATIENT)
Dept: PAIN MANAGEMENT | Age: 60
End: 2020-11-20
Payer: MEDICARE

## 2020-11-20 ENCOUNTER — HOSPITAL ENCOUNTER (OUTPATIENT)
Dept: PAIN MANAGEMENT | Age: 60
Discharge: HOME | End: 2020-11-20
Payer: MEDICARE

## 2020-11-20 ENCOUNTER — HOSPITAL ENCOUNTER (OUTPATIENT)
Dept: MRI IMAGING | Age: 60
Discharge: HOME | End: 2020-11-20
Payer: MEDICARE

## 2020-11-20 ENCOUNTER — HOSPITAL ENCOUNTER (OUTPATIENT)
Dept: SLEEP CENTER | Age: 60
Discharge: HOME | End: 2020-11-22
Payer: MEDICARE

## 2020-11-20 VITALS
RESPIRATION RATE: 18 BRPM | HEART RATE: 134 BPM | SYSTOLIC BLOOD PRESSURE: 130 MMHG | DIASTOLIC BLOOD PRESSURE: 82 MMHG | OXYGEN SATURATION: 97 % | TEMPERATURE: 97.9 F

## 2020-11-20 PROCEDURE — G0399 HOME SLEEP TEST/TYPE 3 PORTA: HCPCS

## 2020-11-20 PROCEDURE — 2500000003 HC RX 250 WO HCPCS

## 2020-11-20 PROCEDURE — 64493 INJ PARAVERT F JNT L/S 1 LEV: CPT

## 2020-11-20 PROCEDURE — 72141 MRI NECK SPINE W/O DYE: CPT

## 2020-11-20 PROCEDURE — 64494 INJ PARAVERT F JNT L/S 2 LEV: CPT

## 2020-11-20 PROCEDURE — 6360000002 HC RX W HCPCS

## 2020-11-20 PROCEDURE — 3209999900 FLUORO FOR SURGICAL PROCEDURES

## 2020-11-20 RX ORDER — SODIUM CHLORIDE 9 MG/ML
INJECTION INTRAVENOUS
Status: COMPLETED | OUTPATIENT
Start: 2020-11-20 | End: 2020-11-20

## 2020-11-20 RX ORDER — LIDOCAINE HYDROCHLORIDE 10 MG/ML
INJECTION, SOLUTION EPIDURAL; INFILTRATION; INTRACAUDAL; PERINEURAL
Status: COMPLETED | OUTPATIENT
Start: 2020-11-20 | End: 2020-11-20

## 2020-11-20 RX ORDER — METHYLPREDNISOLONE ACETATE 80 MG/ML
INJECTION, SUSPENSION INTRA-ARTICULAR; INTRALESIONAL; INTRAMUSCULAR; SOFT TISSUE
Status: COMPLETED | OUTPATIENT
Start: 2020-11-20 | End: 2020-11-20

## 2020-11-20 RX ADMIN — METHYLPREDNISOLONE ACETATE 80 MG: 80 INJECTION, SUSPENSION INTRA-ARTICULAR; INTRALESIONAL; INTRAMUSCULAR; SOFT TISSUE at 09:53

## 2020-11-20 RX ADMIN — LIDOCAINE HYDROCHLORIDE 20 ML: 10 INJECTION, SOLUTION EPIDURAL; INFILTRATION; INTRACAUDAL; PERINEURAL at 09:52

## 2020-11-20 RX ADMIN — SODIUM CHLORIDE 5 ML: 9 INJECTION INTRAVENOUS at 09:53

## 2020-11-20 ASSESSMENT — PAIN - FUNCTIONAL ASSESSMENT: PAIN_FUNCTIONAL_ASSESSMENT: 0-10

## 2020-11-20 ASSESSMENT — PAIN DESCRIPTION - DESCRIPTORS: DESCRIPTORS: ACHING

## 2020-11-20 NOTE — PROGRESS NOTES
Patient's heart rate 146-Dr. Rebollar notifie. Will allow patient to rest a minute and reevaluate. He stated he has not taken any of his meds this am and he is not due them.

## 2020-11-21 PROCEDURE — G0399 HOME SLEEP TEST/TYPE 3 PORTA: HCPCS

## 2020-11-21 PROCEDURE — 95806 SLEEP STUDY UNATT&RESP EFFT: CPT | Performed by: PSYCHIATRY & NEUROLOGY

## 2020-11-23 PROCEDURE — 95806 SLEEP STUDY UNATT&RESP EFFT: CPT | Performed by: PSYCHIATRY & NEUROLOGY

## 2020-11-24 ENCOUNTER — TELEPHONE (OUTPATIENT)
Dept: NEUROLOGY | Age: 60
End: 2020-11-24

## 2020-11-24 PROCEDURE — 95806 SLEEP STUDY UNATT&RESP EFFT: CPT | Performed by: PSYCHIATRY & NEUROLOGY

## 2020-11-24 ASSESSMENT — ENCOUNTER SYMPTOMS
SHORTNESS OF BREATH: 0
COUGH: 0
PHOTOPHOBIA: 0
BACK PAIN: 1
VOMITING: 0
NAUSEA: 0

## 2020-11-24 NOTE — TELEPHONE ENCOUNTER
Dr. Elio Rey    Patient came into the office today requesting a letter today from you. Stating that he is physically and mentally able to take care of himself. Patient kept stating that this should have been done already. I stated to the patient that I did not see any messages from him stating he was needing an letter. If we had received the information from him, the letter may would have been done. Patient mentioned he needs the letter today and that it would only take 5min to complete. I mentioned to the patient that the letter would not be done today. Patient then stated why not? I told the patient that you are busy with patients. Patient then stated that I was making him very upset. And for me to call him.

## 2020-11-25 ENCOUNTER — VIRTUAL VISIT (OUTPATIENT)
Dept: FAMILY MEDICINE CLINIC | Age: 60
End: 2020-11-25
Payer: MEDICARE

## 2020-11-25 PROCEDURE — 99442 PR PHYS/QHP TELEPHONE EVALUATION 11-20 MIN: CPT | Performed by: FAMILY MEDICINE

## 2020-11-25 NOTE — PROGRESS NOTES
Amee Mckenna is a 56 y.o. male evaluated via telephone on 11/25/2020. Consent:  He and/or health care decision maker is aware that that he may receive a bill for this telephone service, depending on his insurance coverage, and has provided verbal consent to proceed: Yes      Documentation:  I communicated with the patient and/or health care decision maker about patient was seen in my office for evaluation of dementia. His MoCA test was not normal.  He was referred to neurology. Neurology evaluate him and diagnosed him with mild cognitive impairment at this point. According to neurology, he does not need POA or conservatorship at this point he should be able to make his own decisions at this particular time. Details of this discussion including any medical advice provided: Encourage healthy diet and exercise. Try to stay as active as possible. I affirm this is a Patient Initiated Episode with a Patient who has not had a related appointment within my department in the past 7 days or scheduled within the next 24 hours.     Patient identification was verified at the start of the visit: Yes    Total Time: minutes: 11-20 minutes    Note: not billable if this call serves to triage the patient into an appointment for the relevant concern      Lugenia Hughes

## 2020-11-30 ENCOUNTER — TELEPHONE (OUTPATIENT)
Dept: PAIN MANAGEMENT | Age: 60
End: 2020-11-30

## 2020-11-30 NOTE — TELEPHONE ENCOUNTER
Return call placed to the patient and I let him know that Jesus Almaguer does not have any openings at this time. I also let him know that she will be sending him a non-narcotic letter. I explained that a weaning dose will be sent to his pharmacy so that the medication is not stopped all at once, but that there would be no further refills after that. I explained that he is still able to come to the office for appointments, and can schedule injections. Patient stated \"whatever you want to do\" and hung up.

## 2020-12-01 RX ORDER — OXYCODONE HYDROCHLORIDE 5 MG/1
5 TABLET ORAL 3 TIMES DAILY
Qty: 42 TABLET | Refills: 0 | Status: SHIPPED | OUTPATIENT
Start: 2020-12-01 | End: 2021-04-29 | Stop reason: ALTCHOICE

## 2020-12-04 ENCOUNTER — TELEPHONE (OUTPATIENT)
Dept: CARDIOLOGY | Age: 60
End: 2020-12-04

## 2020-12-04 ENCOUNTER — OFFICE VISIT (OUTPATIENT)
Dept: FAMILY MEDICINE CLINIC | Age: 60
End: 2020-12-04
Payer: MEDICARE

## 2020-12-04 VITALS
DIASTOLIC BLOOD PRESSURE: 72 MMHG | OXYGEN SATURATION: 98 % | HEART RATE: 102 BPM | SYSTOLIC BLOOD PRESSURE: 138 MMHG | TEMPERATURE: 98.1 F | WEIGHT: 230 LBS | BODY MASS INDEX: 33.97 KG/M2

## 2020-12-04 DIAGNOSIS — I47.10 SVT (SUPRAVENTRICULAR TACHYCARDIA): ICD-10-CM

## 2020-12-04 LAB
ALBUMIN SERPL-MCNC: 4.2 G/DL (ref 3.5–5.2)
ALP BLD-CCNC: 73 U/L (ref 40–130)
ALT SERPL-CCNC: 14 U/L (ref 5–41)
ANION GAP SERPL CALCULATED.3IONS-SCNC: 14 MMOL/L (ref 7–19)
AST SERPL-CCNC: 22 U/L (ref 5–40)
BILIRUB SERPL-MCNC: 1.3 MG/DL (ref 0.2–1.2)
BUN BLDV-MCNC: 9 MG/DL (ref 8–23)
CALCIUM SERPL-MCNC: 9.2 MG/DL (ref 8.8–10.2)
CHLORIDE BLD-SCNC: 103 MMOL/L (ref 98–111)
CO2: 23 MMOL/L (ref 22–29)
CREAT SERPL-MCNC: 0.8 MG/DL (ref 0.5–1.2)
GFR AFRICAN AMERICAN: >59
GFR NON-AFRICAN AMERICAN: >60
GLUCOSE BLD-MCNC: 105 MG/DL (ref 74–109)
HCT VFR BLD CALC: 47.1 % (ref 42–52)
HEMOGLOBIN: 15.6 G/DL (ref 14–18)
MCH RBC QN AUTO: 30.9 PG (ref 27–31)
MCHC RBC AUTO-ENTMCNC: 33.1 G/DL (ref 33–37)
MCV RBC AUTO: 93.3 FL (ref 80–94)
PDW BLD-RTO: 14.2 % (ref 11.5–14.5)
PLATELET # BLD: 102 K/UL (ref 130–400)
PMV BLD AUTO: 12.7 FL (ref 9.4–12.4)
POTASSIUM SERPL-SCNC: 3.9 MMOL/L (ref 3.5–5)
RBC # BLD: 5.05 M/UL (ref 4.7–6.1)
SODIUM BLD-SCNC: 140 MMOL/L (ref 136–145)
TOTAL PROTEIN: 7.4 G/DL (ref 6.6–8.7)
WBC # BLD: 10.3 K/UL (ref 4.8–10.8)

## 2020-12-04 PROCEDURE — G8417 CALC BMI ABV UP PARAM F/U: HCPCS | Performed by: FAMILY MEDICINE

## 2020-12-04 PROCEDURE — 3017F COLORECTAL CA SCREEN DOC REV: CPT | Performed by: FAMILY MEDICINE

## 2020-12-04 PROCEDURE — 1036F TOBACCO NON-USER: CPT | Performed by: FAMILY MEDICINE

## 2020-12-04 PROCEDURE — G8427 DOCREV CUR MEDS BY ELIG CLIN: HCPCS | Performed by: FAMILY MEDICINE

## 2020-12-04 PROCEDURE — 99214 OFFICE O/P EST MOD 30 MIN: CPT | Performed by: FAMILY MEDICINE

## 2020-12-04 PROCEDURE — G8484 FLU IMMUNIZE NO ADMIN: HCPCS | Performed by: FAMILY MEDICINE

## 2020-12-04 PROCEDURE — 93000 ELECTROCARDIOGRAM COMPLETE: CPT | Performed by: FAMILY MEDICINE

## 2020-12-04 ASSESSMENT — ENCOUNTER SYMPTOMS
EYES NEGATIVE: 1
ALLERGIC/IMMUNOLOGIC NEGATIVE: 1
RESPIRATORY NEGATIVE: 1
GASTROINTESTINAL NEGATIVE: 1
BACK PAIN: 1

## 2020-12-04 NOTE — PROGRESS NOTES
IN) Inhale 1 puff into the lungs daily      isosorbide mononitrate (IMDUR) 30 MG extended release tablet Take 1 tablet by mouth daily 30 tablet 3    betamethasone dipropionate 0.05 % lotion Apply topically 2 times daily. 1 Bottle 0    nitroGLYCERIN (NITROSTAT) 0.4 MG SL tablet Place 1 tablet under the tongue every 5 minutes as needed for Chest pain up to max of 3 total doses. If no relief after 1 dose, call 911. 25 tablet 3    tiZANidine (ZANAFLEX) 4 MG tablet TAKE 1 TO 2 TABLETS BY MOUTH EVERY 8 HOURS AS NEEDED 566 tablet 1    folic acid (FOLVITE) 1 MG tablet Take 1 tablet by mouth daily 30 tablet 5    traZODone (DESYREL) 100 MG tablet Take 2 tablets by mouth nightly 180 tablet 1    linaCLOtide (LINZESS) 72 MCG CAPS capsule Take 1 capsule by mouth every morning (before breakfast) Indications: gets samples 30 capsule 5    furosemide (LASIX) 20 MG tablet Take 20 mg by mouth daily      omeprazole (PRILOSEC) 40 MG delayed release capsule Take 40 mg by mouth daily      spironolactone (ALDACTONE) 50 MG tablet Take 50 mg by mouth daily      verapamil (VERELAN) 120 MG extended release capsule Take 120 mg by mouth 2 times daily      Ascorbic Acid (VITAMIN C) 1000 MG tablet Take 1,000 mg by mouth daily      Vitamin E 400 units TABS Take 450 Units by mouth daily      Ferrous Sulfate (IRON) 325 (65 Fe) MG TABS Take 325 mg by mouth daily 30 tablet 5     No current facility-administered medications for this visit. Allergies   Allergen Reactions    Bee Venom        Review of Systems   Constitutional: Negative. HENT: Negative. Eyes: Negative. Respiratory: Negative. Cardiovascular: Negative. Gastrointestinal: Negative. Endocrine: Negative. Genitourinary: Negative. Musculoskeletal: Positive for back pain. Skin: Negative. Allergic/Immunologic: Negative. Neurological: Negative. Hematological: Negative. Psychiatric/Behavioral: Negative.         OBJECTIVE:    Physical Exam  Vitals signs reviewed. Constitutional:       Appearance: Normal appearance. He is well-developed. He is obese. HENT:      Head: Normocephalic and atraumatic. Right Ear: Tympanic membrane, ear canal and external ear normal. There is no impacted cerumen. Left Ear: Tympanic membrane, ear canal and external ear normal. There is no impacted cerumen. Nose: Nose normal.      Mouth/Throat:      Lips: Pink. Mouth: Mucous membranes are moist.      Dentition: Normal dentition. Tongue: No lesions. Pharynx: Oropharynx is clear. Uvula midline. Tonsils: No tonsillar exudate or tonsillar abscesses. Eyes:      General: Lids are normal.         Right eye: No discharge. Left eye: No discharge. Extraocular Movements:      Right eye: Normal extraocular motion. Left eye: Normal extraocular motion. Conjunctiva/sclera: Conjunctivae normal.      Right eye: Right conjunctiva is not injected. Left eye: Left conjunctiva is not injected. Pupils: Pupils are equal, round, and reactive to light. Neck:      Musculoskeletal: Normal range of motion and neck supple. Thyroid: No thyromegaly. Vascular: No carotid bruit or JVD. Cardiovascular:      Rate and Rhythm: Normal rate and regular rhythm. Pulses:           Carotid pulses are 2+ on the right side and 2+ on the left side. Radial pulses are 2+ on the right side and 2+ on the left side. Heart sounds: Normal heart sounds, S1 normal and S2 normal. No murmur. Pulmonary:      Effort: Pulmonary effort is normal. No accessory muscle usage. Breath sounds: Normal breath sounds. Abdominal:      General: Bowel sounds are normal. There is no distension or abdominal bruit. Palpations: Abdomen is soft. There is no mass. Tenderness: There is no abdominal tenderness. Hernia: No hernia is present. Musculoskeletal:      Right hip: He exhibits decreased range of motion.       Left hip: He exhibits decreased range of motion. Cervical back: He exhibits decreased range of motion, tenderness, pain and spasm. Lumbar back: He exhibits decreased range of motion, tenderness, pain and spasm. Right lower leg: No edema. Left lower leg: No edema. Lymphadenopathy:      Cervical:      Right cervical: No superficial cervical adenopathy. Left cervical: No superficial cervical adenopathy. Skin:     General: Skin is warm and dry. Coloration: Skin is not jaundiced or pale. Findings: No lesion or rash. Nails: There is no clubbing. Neurological:      Mental Status: He is alert and oriented to person, place, and time. Cranial Nerves: No facial asymmetry. Motor: No weakness or tremor. Coordination: Coordination normal.      Gait: Gait normal.      Deep Tendon Reflexes: Reflexes are normal and symmetric. Psychiatric:         Attention and Perception: Attention normal.         Mood and Affect: Mood normal.         Speech: Speech normal.         Behavior: Behavior normal.         Thought Content: Thought content normal.         Cognition and Memory: Memory normal.         Judgment: Judgment normal.        /72 (Site: Left Upper Arm, Position: Sitting, Cuff Size: Medium Adult)   Pulse 102   Temp 98.1 °F (36.7 °C) (Temporal)   Wt 230 lb (104.3 kg)   SpO2 98%   BMI 33.97 kg/m²      ASSESSMENT:     Diagnosis Orders   1. Chronic midline low back pain, unspecified whether sciatica present-chronic cannot rule out spinal bleed as a cause of his back pain CT LUMBAR SPINE W CONTRAST    External Referral To Pain Clinic   2. SVT (supraventricular tachycardia) (HCC)-back in sinus rhythm External Referral To Cardiology    [facility]    Comprehensive Metabolic Panel        PLAN:    1.  CT of the lumbar spine. Refer to pain management. 2.  Patient want to be referred to cardiology in [facility]. He does not want to go back to [facility].   EKG shows normal sinus rhythm.   Follow-up 3 months

## 2020-12-07 ENCOUNTER — TELEPHONE (OUTPATIENT)
Dept: CARDIOLOGY | Age: 60
End: 2020-12-07

## 2020-12-07 NOTE — TELEPHONE ENCOUNTER
Patient is requesting results of [facility]. I don't see them in media. Can you check website and [facility]

## 2020-12-08 ENCOUNTER — TELEPHONE (OUTPATIENT)
Dept: FAMILY MEDICINE CLINIC | Age: 60
End: 2020-12-08

## 2020-12-08 ENCOUNTER — VIRTUAL VISIT (OUTPATIENT)
Dept: FAMILY MEDICINE CLINIC | Age: 60
End: 2020-12-08
Payer: MEDICARE

## 2020-12-08 ENCOUNTER — TELEPHONE (OUTPATIENT)
Dept: CARDIOLOGY | Age: 60
End: 2020-12-08

## 2020-12-08 PROCEDURE — 99442 PR PHYS/QHP TELEPHONE EVALUATION 11-20 MIN: CPT | Performed by: FAMILY MEDICINE

## 2020-12-08 RX ORDER — METOPROLOL SUCCINATE 50 MG/1
50 TABLET, EXTENDED RELEASE ORAL DAILY
Qty: 30 TABLET | Refills: 5 | Status: SHIPPED | OUTPATIENT
Start: 2020-12-08 | End: 2021-01-28 | Stop reason: ALTCHOICE

## 2020-12-08 NOTE — TELEPHONE ENCOUNTER
We did not order that test. Cardiology did. I sent a message to Vincenzo Blanco with Cardiology to see if they have a report. He was registered for it on 11/12/20.

## 2020-12-08 NOTE — TELEPHONE ENCOUNTER
Indra Galdamez requests that clinic return His call in regards to heart monitor results . The best time to reach him is Anytime. Thank you.

## 2020-12-08 NOTE — PROGRESS NOTES
Naheed Leung is a 56 y.o. male evaluated via telephone on 12/8/2020. Consent:  He and/or health care decision maker is aware that that he may receive a bill for this telephone service, depending on his insurance coverage, and has provided verbal consent to proceed: Yes      Documentation:  I communicated with the patient and/or health care decision maker about patient here for evaluation of SVT. Patient have a event monitor. He have SVT. He decided to switch cardiologist.  Cardiology have not seen him for the SVT. He is taking verapamil. Heart rates occasionally goes to the 170s and slow down. He does not feel good when that happened. He have an appointment to see the new cardiologist in [facility] on Friday. .   Details of this discussion including any medical advice provided: Discontinue verapamil. Trial of Toprol 50 daily. Patient more than likely will need to see an electrophysiologist.      I affirm this is a Patient Initiated Episode with a Patient who has not had a related appointment within my department in the past 7 days or scheduled within the next 24 hours.     Patient identification was verified at the start of the visit: Yes    Total Time: minutes: 11-20 minutes    Note: not billable if this call serves to triage the patient into an appointment for the relevant concern      Nolan Hawley

## 2020-12-14 ENCOUNTER — TELEPHONE (OUTPATIENT)
Dept: CARDIOLOGY | Age: 60
End: 2020-12-14

## 2020-12-14 NOTE — TELEPHONE ENCOUNTER
Keitho analysis time 7 days and 20 hours. PCP called for rereport on 12/8/20. The report had not been printed for my review as of 12/8/20. The report was faxed to Raven Ayala as requested. Upon review of his note, verapamil was stopped and Toprol XL was started. The patient did not come to follow up appointment with Dr. Deangelo Rose. Per Dr. Philip Kent note, the patient opted to see a cardiologist in [place] due to be  12/11/20. Underlying rhythm - NSR  Average heart rate 69  Minimum heart rate 40 at 12:07 am.  Max heart rate 174 with SVT run   12 SVT runs - longest 6 hours and 30 minutes. 1 VT run 10 beats in duration. No AF, pauses or heart block. Rare PAC and PVC. 2 patient triggers - NSR, SVT and PAC.   TLM

## 2020-12-15 NOTE — TELEPHONE ENCOUNTER
Called and spoke with patient, he advised that he was unable to keep his appt because he broke his foot and had to r/s. Patient advised there's only one cardiologist at [facility]. Will call for their fax number and send records.

## 2020-12-28 ENCOUNTER — VIRTUAL VISIT (OUTPATIENT)
Dept: FAMILY MEDICINE CLINIC | Age: 60
End: 2020-12-28
Payer: MEDICARE

## 2020-12-28 PROCEDURE — 99442 PR PHYS/QHP TELEPHONE EVALUATION 11-20 MIN: CPT | Performed by: FAMILY MEDICINE

## 2020-12-28 RX ORDER — MELATONIN 10 MG
20 CAPSULE ORAL NIGHTLY
Qty: 60 CAPSULE | Refills: 5 | Status: SHIPPED | OUTPATIENT
Start: 2020-12-28 | End: 2021-04-29 | Stop reason: ALTCHOICE

## 2020-12-28 RX ORDER — DOXYCYCLINE HYCLATE 100 MG
100 TABLET ORAL 2 TIMES DAILY
Qty: 20 TABLET | Refills: 0 | Status: SHIPPED | OUTPATIENT
Start: 2020-12-28 | End: 2021-01-07

## 2020-12-28 RX ORDER — HYDROCODONE BITARTRATE AND ACETAMINOPHEN 7.5; 325 MG/1; MG/1
1 TABLET ORAL EVERY 6 HOURS PRN
Qty: 30 TABLET | Refills: 0 | Status: SHIPPED | OUTPATIENT
Start: 2020-12-28 | End: 2021-01-04

## 2021-01-05 ENCOUNTER — TELEPHONE (OUTPATIENT)
Dept: PAIN MANAGEMENT | Age: 61
End: 2021-01-05

## 2021-01-05 ENCOUNTER — TELEPHONE (OUTPATIENT)
Dept: FAMILY MEDICINE CLINIC | Age: 61
End: 2021-01-05

## 2021-01-05 NOTE — LETTER
January 7, 2021      ALESSIO Horne  ATTN: [facility]  [address], [address]  [email or web address]      RE: [email or web address] [email or web address]  [phone]      To whom it may concern; This letter is for Mr. Anai Chiu, who I have been seeing over the past year. He currently has a P.O. Box for his mail and due to his health problems and current foot fracture and upcoming surgery, he is requesting a letter from me requesting his mail be delivered to his home. I do feel this is a reasonable request given his current health and transportation issues. Please take this into consideration. If you have any additional requests for me or need anything additional, please let me know.     Sincerely,        Nelson Mccabe MD

## 2021-01-07 ENCOUNTER — TELEPHONE (OUTPATIENT)
Dept: FAMILY MEDICINE CLINIC | Age: 61
End: 2021-01-07

## 2021-01-07 ENCOUNTER — TELEPHONE (OUTPATIENT)
Dept: CARDIOLOGY CLINIC | Age: 61
End: 2021-01-07

## 2021-01-07 NOTE — TELEPHONE ENCOUNTER
Date: 1/12/21    Cardiologist: Aracelis    Procedure: open tx L 3rd metatarsal vs percutaneous pinning    Surgeon: Anival Forman    Last Office Visit: 11/12/20  Reason for office visit and medical concerns addressed at this office visit: copd, palpitations    Testing Performed and Date of Service:  10/21/20DSE abnormal dse with clinical evidence of myocardial ischemia unaccompanied by EKG or echo abnormalities    RCRI = 0.4   METs 4    Current Medications: doxycycline, melatonin, toprol, voltaren, imdur, nitro, tizsanidine, trazodone, linzess, lasix, prilosec, spironolactone. Is the patient currently taking an anticoagulant? If so, what is the diagnosis the patient has been given to warrant the need for the anticoagulant?  no    Additional Notes: requesting cardiac clearance prior to procedure

## 2021-01-07 NOTE — TELEPHONE ENCOUNTER
Saturnino Black requests that Juan Jose Aiea nurse return their call. The best time to reach him is Anytime. Pt calling the office back to speak with the nurse about his EKG. Please call pt back. Thank you.

## 2021-01-07 NOTE — TELEPHONE ENCOUNTER
Letter is completed and will be e-mailed to Josef Gavin per pt request
Mahendra Cavanaugh requests that Benjiman Simmonds nurse  return their call. The best time to reach him is Anytime. Pt called wanting to speak with Benjiman Simmonds nurse about his mail. Please call pt back. Thank you.
Fall risk

## 2021-01-07 NOTE — TELEPHONE ENCOUNTER
I have faxed the cardiac clearance request to [facility] and have spoken with the , Sharmin Mead to see if they will do this clearance. Patient has been scheduled with [facility] to establish care, but that appointment is not until February. Patient has a foot fracture that needs cardiac clearance for surgery with Dr. Byron Alcazar for next week, (possibly Tuesday).

## 2021-01-08 NOTE — TELEPHONE ENCOUNTER
I spoke with Ricky at [facility]. They have received the cardiac clearance request and have forwarded it to M[place]ir [place] to review and approve. She should review that on Monday and as long as no other testing is required she will sign off on clearance. Ricky said to check back by lunch Monday if we haven't gotten anything. Spoke with Ella Lackey at [facility] and she is aware we are waiting on clearance from Cardiology. She said they will schedule patient for pre-op and surgery as soon as they receive that clearance letter. Patient has been told of the above circumstances. He has also been scheduled with [facility] for February 12, 2012 at 9:30 with Dr. Adriano Ojeda. Pt is aware.

## 2021-01-11 NOTE — TELEPHONE ENCOUNTER
Tete Graft,  Cath 10/20:  Normal LV systolic function. Normal coronaries. Ok to cardiac risk stratify and send letter.   Thanks, Elida Apo

## 2021-01-11 NOTE — TELEPHONE ENCOUNTER
Torres Moore requests that clinic return his call. The best time to reach him is Anytime. Patient asking for status of refill request that was put in on 1/7/21. Thank you.

## 2021-01-12 ENCOUNTER — TELEPHONE (OUTPATIENT)
Dept: ADMINISTRATIVE | Age: 61
End: 2021-01-12

## 2021-01-12 NOTE — TELEPHONE ENCOUNTER
Pt. Is needing to know if lubna signed off on ekq. He states in he's in a lot of pain with foot and needing surgery. Please advise.

## 2021-01-13 NOTE — TELEPHONE ENCOUNTER
Cardiac Clearance has been received and faxed to Dr. Noemí Barrera office to get surgery scheduled ASAP. My , Gretchen Leyden, will be calling patient to discuss. Home Health to go today and evaluate patient and notify me of any needs.

## 2021-01-22 RX ORDER — ONDANSETRON 4 MG/1
4 TABLET, ORALLY DISINTEGRATING ORAL EVERY 4 HOURS PRN
Qty: 30 TABLET | Refills: 1 | Status: SHIPPED | OUTPATIENT
Start: 2021-01-22 | End: 2021-03-16

## 2021-01-28 ENCOUNTER — OFFICE VISIT (OUTPATIENT)
Dept: FAMILY MEDICINE CLINIC | Age: 61
End: 2021-01-28
Payer: MEDICARE

## 2021-01-28 VITALS
WEIGHT: 236.2 LBS | DIASTOLIC BLOOD PRESSURE: 64 MMHG | HEART RATE: 84 BPM | BODY MASS INDEX: 34.88 KG/M2 | TEMPERATURE: 97.1 F | SYSTOLIC BLOOD PRESSURE: 140 MMHG | OXYGEN SATURATION: 97 %

## 2021-01-28 DIAGNOSIS — R94.39 POSITIVE CARDIAC STRESS TEST: ICD-10-CM

## 2021-01-28 DIAGNOSIS — K74.60 HEPATIC CIRRHOSIS, UNSPECIFIED HEPATIC CIRRHOSIS TYPE, UNSPECIFIED WHETHER ASCITES PRESENT (HCC): ICD-10-CM

## 2021-01-28 DIAGNOSIS — D69.6 THROMBOCYTOPENIA (HCC): ICD-10-CM

## 2021-01-28 DIAGNOSIS — S92.332D CLOSED DISPLACED FRACTURE OF THIRD METATARSAL BONE OF LEFT FOOT WITH ROUTINE HEALING, SUBSEQUENT ENCOUNTER: Primary | ICD-10-CM

## 2021-01-28 DIAGNOSIS — I47.10 SVT (SUPRAVENTRICULAR TACHYCARDIA): ICD-10-CM

## 2021-01-28 DIAGNOSIS — I47.1 SVT (SUPRAVENTRICULAR TACHYCARDIA) (HCC): ICD-10-CM

## 2021-01-28 DIAGNOSIS — R90.89 ABNORMAL BRAIN MRI: ICD-10-CM

## 2021-01-28 DIAGNOSIS — R51.9 NONINTRACTABLE HEADACHE, UNSPECIFIED CHRONICITY PATTERN, UNSPECIFIED HEADACHE TYPE: ICD-10-CM

## 2021-01-28 LAB
ALBUMIN SERPL-MCNC: 4.1 G/DL (ref 3.5–5.2)
ALP BLD-CCNC: 73 U/L (ref 40–130)
ALT SERPL-CCNC: 18 U/L (ref 5–41)
ANION GAP SERPL CALCULATED.3IONS-SCNC: 11 MMOL/L (ref 7–19)
AST SERPL-CCNC: 25 U/L (ref 5–40)
BASOPHILS ABSOLUTE: 0.1 K/UL (ref 0–0.2)
BASOPHILS RELATIVE PERCENT: 1.2 % (ref 0–1)
BILIRUB SERPL-MCNC: 0.8 MG/DL (ref 0.2–1.2)
BUN BLDV-MCNC: 15 MG/DL (ref 8–23)
CALCIUM SERPL-MCNC: 9 MG/DL (ref 8.8–10.2)
CHLORIDE BLD-SCNC: 99 MMOL/L (ref 98–111)
CO2: 27 MMOL/L (ref 22–29)
CREAT SERPL-MCNC: 0.8 MG/DL (ref 0.5–1.2)
EOSINOPHILS ABSOLUTE: 0.2 K/UL (ref 0–0.6)
EOSINOPHILS RELATIVE PERCENT: 2.2 % (ref 0–5)
GFR AFRICAN AMERICAN: >59
GFR NON-AFRICAN AMERICAN: >60
GLUCOSE BLD-MCNC: 177 MG/DL (ref 74–109)
HCT VFR BLD CALC: 41.4 % (ref 42–52)
HEMOGLOBIN: 13.6 G/DL (ref 14–18)
IMMATURE GRANULOCYTES #: 0.1 K/UL
INR BLD: 1.24 (ref 0.88–1.18)
LYMPHOCYTES ABSOLUTE: 0.8 K/UL (ref 1.1–4.5)
LYMPHOCYTES RELATIVE PERCENT: 10.1 % (ref 20–40)
MCH RBC QN AUTO: 29.8 PG (ref 27–31)
MCHC RBC AUTO-ENTMCNC: 32.9 G/DL (ref 33–37)
MCV RBC AUTO: 90.6 FL (ref 80–94)
MONOCYTES ABSOLUTE: 0.5 K/UL (ref 0–0.9)
MONOCYTES RELATIVE PERCENT: 6.9 % (ref 0–10)
NEUTROPHILS ABSOLUTE: 6 K/UL (ref 1.5–7.5)
NEUTROPHILS RELATIVE PERCENT: 78.4 % (ref 50–65)
PDW BLD-RTO: 15.3 % (ref 11.5–14.5)
PLATELET # BLD: 122 K/UL (ref 130–400)
PMV BLD AUTO: 12.1 FL (ref 9.4–12.4)
POTASSIUM SERPL-SCNC: 4 MMOL/L (ref 3.5–5)
PROTHROMBIN TIME: 15.6 SEC (ref 12–14.6)
RBC # BLD: 4.57 M/UL (ref 4.7–6.1)
SODIUM BLD-SCNC: 137 MMOL/L (ref 136–145)
TOTAL PROTEIN: 6.9 G/DL (ref 6.6–8.7)
WBC # BLD: 7.6 K/UL (ref 4.8–10.8)

## 2021-01-28 PROCEDURE — 3017F COLORECTAL CA SCREEN DOC REV: CPT | Performed by: FAMILY MEDICINE

## 2021-01-28 PROCEDURE — 1036F TOBACCO NON-USER: CPT | Performed by: FAMILY MEDICINE

## 2021-01-28 PROCEDURE — G8427 DOCREV CUR MEDS BY ELIG CLIN: HCPCS | Performed by: FAMILY MEDICINE

## 2021-01-28 PROCEDURE — 99214 OFFICE O/P EST MOD 30 MIN: CPT | Performed by: FAMILY MEDICINE

## 2021-01-28 PROCEDURE — G8417 CALC BMI ABV UP PARAM F/U: HCPCS | Performed by: FAMILY MEDICINE

## 2021-01-28 PROCEDURE — G8484 FLU IMMUNIZE NO ADMIN: HCPCS | Performed by: FAMILY MEDICINE

## 2021-01-28 NOTE — PROGRESS NOTES
SUBJECTIVE:    Patient ID: Everardo Jean is a 59 y.o.male. HPI:   Patient here for follow-up hospital stay. Patient is a 26-year-old white male. He fractured his left third metatarsal.  Fracture was displaced. He was admitted to [facility] for repair. He have uneventful stay. Platelets were found to be low but did not have any major bleed. He was not anticoagulated secondary to that. He have history of SVTs couple days after discharge he got admitted to [facility] for SVT. He converted with Cardizem. His metoprolol was changed to sotalol. He has follow-up with cardiology. He used to see a hematologist for his low platelets. Not recent visit to hematology. He have cirrhosis of the liver secondary to alcohol. He continues to drink occasionally. Last drink was 2 days ago. He drinks 5 beers according to him.        Past Medical History:   Diagnosis Date    Chronic back pain     Cirrhosis (HCC)     COPD (chronic obstructive pulmonary disease) (HCC)     GERD (gastroesophageal reflux disease)     Liver disease       Current Outpatient Medications   Medication Sig Dispense Refill    ondansetron (ZOFRAN-ODT) 4 MG disintegrating tablet Take 1 tablet by mouth every 4 hours as needed for Nausea or Vomiting 30 tablet 1    melatonin 10 MG CAPS capsule Take 2 capsules by mouth nightly 60 capsule 5    metoprolol succinate (TOPROL XL) 50 MG extended release tablet Take 1 tablet by mouth daily 30 tablet 5    diclofenac (VOLTAREN) 50 MG EC tablet Take 1 tablet by mouth 2 times daily 180 tablet 3    Lift Chair MISC by Does not apply route 1 each 0    albuterol sulfate (PROAIR RESPICLICK) 064 (90 Base) MCG/ACT aerosol powder inhalation Inhale 2 puffs into the lungs every 4 hours as needed for Wheezing or Shortness of Breath      Fluticasone-Umeclidin-Vilant (TRELEGY ELLIPTA IN) Inhale 1 puff into the lungs daily      isosorbide mononitrate (IMDUR) 30 MG extended release tablet Take 1 tablet by mouth daily 30 tablet 3    betamethasone dipropionate 0.05 % lotion Apply topically 2 times daily. 1 Bottle 0    nitroGLYCERIN (NITROSTAT) 0.4 MG SL tablet Place 1 tablet under the tongue every 5 minutes as needed for Chest pain up to max of 3 total doses. If no relief after 1 dose, call 911. 25 tablet 3    tiZANidine (ZANAFLEX) 4 MG tablet TAKE 1 TO 2 TABLETS BY MOUTH EVERY 8 HOURS AS NEEDED 563 tablet 1    folic acid (FOLVITE) 1 MG tablet Take 1 tablet by mouth daily 30 tablet 5    traZODone (DESYREL) 100 MG tablet Take 2 tablets by mouth nightly 180 tablet 1    linaCLOtide (LINZESS) 72 MCG CAPS capsule Take 1 capsule by mouth every morning (before breakfast) Indications: gets samples 30 capsule 5    furosemide (LASIX) 20 MG tablet Take 20 mg by mouth daily      omeprazole (PRILOSEC) 40 MG delayed release capsule Take 40 mg by mouth daily      spironolactone (ALDACTONE) 50 MG tablet Take 50 mg by mouth daily      Ascorbic Acid (VITAMIN C) 1000 MG tablet Take 1,000 mg by mouth daily      Vitamin E 400 units TABS Take 450 Units by mouth daily      Ferrous Sulfate (IRON) 325 (65 Fe) MG TABS Take 325 mg by mouth daily 30 tablet 5    oxyCODONE (ROXICODONE) 5 MG immediate release tablet Take 1 tablet by mouth three times daily for 7 days. 1 tab BID x7 days, 1 tab Daily x7 days, then off 42 tablet 0     No current facility-administered medications for this visit. Allergies   Allergen Reactions    Bee Venom        Review of Systems   Constitutional: Negative. HENT: Negative. Eyes: Negative. Respiratory: Negative. Cardiovascular: Negative. Gastrointestinal: Negative. Endocrine: Negative. Genitourinary: Negative. Musculoskeletal: Negative. Skin: Negative. Allergic/Immunologic: Negative. Neurological: Negative. Hematological: Negative. Psychiatric/Behavioral: Negative. OBJECTIVE:    Physical Exam  Vitals signs reviewed.    Constitutional:       Appearance: Normal appearance. He is well-developed. He is obese. HENT:      Head: Normocephalic and atraumatic. Right Ear: Tympanic membrane, ear canal and external ear normal. There is no impacted cerumen. Left Ear: Tympanic membrane, ear canal and external ear normal. There is no impacted cerumen. Nose: Nose normal.      Mouth/Throat:      Lips: Pink. Mouth: Mucous membranes are moist.      Dentition: Normal dentition. Tongue: No lesions. Pharynx: Oropharynx is clear. Uvula midline. Tonsils: No tonsillar exudate or tonsillar abscesses. Eyes:      General: Lids are normal.         Right eye: No discharge. Left eye: No discharge. Extraocular Movements:      Right eye: Normal extraocular motion. Left eye: Normal extraocular motion. Conjunctiva/sclera: Conjunctivae normal.      Right eye: Right conjunctiva is not injected. Left eye: Left conjunctiva is not injected. Pupils: Pupils are equal, round, and reactive to light. Neck:      Musculoskeletal: Normal range of motion and neck supple. Thyroid: No thyromegaly. Vascular: No carotid bruit or JVD. Cardiovascular:      Rate and Rhythm: Normal rate and regular rhythm. Pulses:           Carotid pulses are 2+ on the right side and 2+ on the left side. Radial pulses are 2+ on the right side and 2+ on the left side. Heart sounds: Normal heart sounds, S1 normal and S2 normal. No murmur. Pulmonary:      Effort: Pulmonary effort is normal. No accessory muscle usage. Breath sounds: Normal breath sounds. Abdominal:      General: Bowel sounds are normal. There is no distension or abdominal bruit. Palpations: Abdomen is soft. There is no mass. Tenderness: There is no abdominal tenderness. Hernia: No hernia is present. Musculoskeletal:      Right hip: He exhibits decreased range of motion. Left hip: He exhibits decreased range of motion.       Cervical back: He exhibits decreased range of motion, tenderness, pain and spasm. Lumbar back: He exhibits decreased range of motion, tenderness, pain and spasm. Right lower leg: No edema. Left lower leg: No edema. Comments: Left leg splint. Lymphadenopathy:      Cervical:      Right cervical: No superficial cervical adenopathy. Left cervical: No superficial cervical adenopathy. Skin:     General: Skin is warm and dry. Coloration: Skin is not jaundiced or pale. Findings: No lesion or rash. Nails: There is no clubbing. Neurological:      Mental Status: He is alert and oriented to person, place, and time. Cranial Nerves: No facial asymmetry. Motor: No weakness or tremor. Coordination: Coordination normal.      Gait: Gait normal.      Deep Tendon Reflexes: Reflexes are normal and symmetric. Psychiatric:         Attention and Perception: Attention normal.         Mood and Affect: Mood normal.         Speech: Speech normal.         Behavior: Behavior normal.         Thought Content: Thought content normal.         Cognition and Memory: Memory normal.         Judgment: Judgment normal.        BP (!) 140/64 (Site: Left Upper Arm, Position: Sitting, Cuff Size: Medium Adult)   Pulse 84   Temp 97.1 °F (36.2 °C) (Temporal)   Wt 236 lb 3.2 oz (107.1 kg)   SpO2 97%   BMI 34.88 kg/m²      ASSESSMENT:     Diagnosis Orders   1. Closed displaced fracture of third metatarsal bone of left foot with routine healing, subsequent encounterhealing slowly    2. SVT (supraventricular tachycardia) (HCC)recurrent CBC Auto Differential    Comprehensive Metabolic Panel   3. Thrombocytopenia (HCC)more than likely secondary to #4 External Referral To Hematology Oncology    CBC Auto Differential    Comprehensive Metabolic Panel   4. Hepatic cirrhosis, unspecified hepatic cirrhosis type, unspecified whether ascites present (HCC)ongoing Protime-INR        PLAN:    1. Record from [facility] reviewed today. Follow-up with Ortho. He has an appointment tomorrow. 2.  Follow-up with cardiology. Continue medications. Unable to do anticoagulation secondary to thrombocytopenia  3. Refer to hematology  4. Blood work. INR. Encouraged to quit smoking for good. Encouraged weight loss.   Follow-up after seen by cardiology and hematology

## 2021-02-02 LAB — CERULOPLASMIN: 26 MG/DL (ref 15–30)

## 2021-02-05 DIAGNOSIS — I47.1 SVT (SUPRAVENTRICULAR TACHYCARDIA) (HCC): Primary | ICD-10-CM

## 2021-02-05 DIAGNOSIS — D69.6 THROMBOCYTOPENIA (HCC): ICD-10-CM

## 2021-02-18 ENCOUNTER — VIRTUAL VISIT (OUTPATIENT)
Dept: FAMILY MEDICINE CLINIC | Age: 61
End: 2021-02-18
Payer: MEDICARE

## 2021-02-18 DIAGNOSIS — E61.1 IRON DEFICIENCY: ICD-10-CM

## 2021-02-18 DIAGNOSIS — G47.00 INSOMNIA, UNSPECIFIED TYPE: Primary | ICD-10-CM

## 2021-02-18 PROCEDURE — 99442 PR PHYS/QHP TELEPHONE EVALUATION 11-20 MIN: CPT | Performed by: FAMILY MEDICINE

## 2021-02-18 RX ORDER — FOLIC ACID 1 MG/1
1 TABLET ORAL DAILY
Qty: 30 TABLET | Refills: 5 | Status: SHIPPED | OUTPATIENT
Start: 2021-02-18

## 2021-02-18 RX ORDER — SUVOREXANT 15 MG/1
15 TABLET, FILM COATED ORAL NIGHTLY PRN
Qty: 30 TABLET | Refills: 2 | Status: SHIPPED | OUTPATIENT
Start: 2021-02-18 | End: 2021-02-19 | Stop reason: SDUPTHER

## 2021-02-18 NOTE — PROGRESS NOTES
Fitz Contreras is a 59 y.o. male evaluated via telephone on 2/18/2021. Consent:  He and/or health care decision maker is aware that that he may receive a bill for this telephone service, depending on his insurance coverage, and has provided verbal consent to proceed: Yes      Documentation:  I communicated with the patient and/or health care decision maker about here for follow-up of multiple medical problems. Patient is 66-year-old white male. He have insomnia. He having a hard time sleeping. He was taking trazodone 200 mg with good results until the last couple of weeks. He said that he have not been able to sleep but an hour or 2 at night for the last 2 to 3 weeks. He does not know what else to do. He has been taking melatonin with it. He never tried Belsomra. He also have folic acid deficiency. He been taking folic acid supplementation therapy request a refill of medication. .   Details of this discussion including any medical advice provided: Chay Rondon. Trial of Belsomra 15 mg. Follow-up 1 month. Will refill folic acid. Will need folic acid level next time he comes to the office. Discontinue trazodone also. I affirm this is a Patient Initiated Episode with a Patient who has not had a related appointment within my department in the past 7 days or scheduled within the next 24 hours.     Patient identification was verified at the start of the visit: Yes    Total Time: minutes: 11-20 minutes    Note: not billable if this call serves to triage the patient into an appointment for the relevant concern      Bahman Aly

## 2021-02-19 DIAGNOSIS — G47.00 INSOMNIA, UNSPECIFIED TYPE: ICD-10-CM

## 2021-02-19 RX ORDER — SUVOREXANT 15 MG/1
15 TABLET, FILM COATED ORAL NIGHTLY PRN
Qty: 30 TABLET | Refills: 2 | Status: SHIPPED | OUTPATIENT
Start: 2021-02-19 | End: 2021-03-16 | Stop reason: ALTCHOICE

## 2021-02-22 NOTE — TELEPHONE ENCOUNTER
Almajuan carlos Marium is calling for a medication refill that is not listed in his chart. Verapamil 120mg 12hr Tab. Patient stated that he is completely out of this medication. Last office visit: 2/18/2021  Next office visit: Visit date not found   Preferred Pharmacy: [id number] [facility]    Please call patient to clarify. Thank You.

## 2021-02-23 RX ORDER — FUROSEMIDE 20 MG/1
20 TABLET ORAL DAILY
Qty: 180 TABLET | Refills: 3 | Status: SHIPPED | OUTPATIENT
Start: 2021-02-23

## 2021-02-23 RX ORDER — VERAPAMIL HYDROCHLORIDE 120 MG/1
120 CAPSULE, EXTENDED RELEASE ORAL 2 TIMES DAILY
Qty: 60 CAPSULE | Refills: 5 | Status: SHIPPED | OUTPATIENT
Start: 2021-02-23 | End: 2021-02-24 | Stop reason: SDUPTHER

## 2021-02-24 ENCOUNTER — TELEPHONE (OUTPATIENT)
Dept: ADMINISTRATIVE | Age: 61
End: 2021-02-24

## 2021-02-24 RX ORDER — VERAPAMIL HYDROCHLORIDE 120 MG/1
120 CAPSULE, EXTENDED RELEASE ORAL 2 TIMES DAILY
Qty: 180 CAPSULE | Refills: 1 | Status: SHIPPED | OUTPATIENT
Start: 2021-02-24 | End: 2021-03-01

## 2021-02-24 RX ORDER — VERAPAMIL HYDROCHLORIDE 120 MG/1
120 CAPSULE, EXTENDED RELEASE ORAL 2 TIMES DAILY
Qty: 180 CAPSULE | Refills: 1 | Status: SHIPPED | OUTPATIENT
Start: 2021-02-24 | End: 2021-02-24 | Stop reason: SDUPTHER

## 2021-03-01 ENCOUNTER — TELEPHONE (OUTPATIENT)
Dept: FAMILY MEDICINE CLINIC | Age: 61
End: 2021-03-01

## 2021-03-01 DIAGNOSIS — D69.6 THROMBOCYTOPENIA (HCC): ICD-10-CM

## 2021-03-01 DIAGNOSIS — I47.10 SVT (SUPRAVENTRICULAR TACHYCARDIA): ICD-10-CM

## 2021-03-01 LAB
ALBUMIN SERPL-MCNC: 4.6 G/DL (ref 3.5–5.2)
ALP BLD-CCNC: 89 U/L (ref 40–130)
ALT SERPL-CCNC: 24 U/L (ref 5–41)
ANION GAP SERPL CALCULATED.3IONS-SCNC: 17 MMOL/L (ref 7–19)
AST SERPL-CCNC: 32 U/L (ref 5–40)
BASOPHILS ABSOLUTE: 0.1 K/UL (ref 0–0.2)
BASOPHILS RELATIVE PERCENT: 0.8 % (ref 0–1)
BILIRUB SERPL-MCNC: 2.3 MG/DL (ref 0.2–1.2)
BUN BLDV-MCNC: 9 MG/DL (ref 8–23)
CALCIUM SERPL-MCNC: 9.7 MG/DL (ref 8.8–10.2)
CHLORIDE BLD-SCNC: 100 MMOL/L (ref 98–111)
CO2: 24 MMOL/L (ref 22–29)
CREAT SERPL-MCNC: 0.9 MG/DL (ref 0.5–1.2)
EOSINOPHILS ABSOLUTE: 0.3 K/UL (ref 0–0.6)
EOSINOPHILS RELATIVE PERCENT: 2.4 % (ref 0–5)
GFR AFRICAN AMERICAN: >59
GFR NON-AFRICAN AMERICAN: >60
GLUCOSE BLD-MCNC: 91 MG/DL (ref 74–109)
HCT VFR BLD CALC: 47.8 % (ref 42–52)
HEMOGLOBIN: 15.5 G/DL (ref 14–18)
IMMATURE GRANULOCYTES #: 0.1 K/UL
LYMPHOCYTES ABSOLUTE: 1.4 K/UL (ref 1.1–4.5)
LYMPHOCYTES RELATIVE PERCENT: 11.8 % (ref 20–40)
MCH RBC QN AUTO: 28.5 PG (ref 27–31)
MCHC RBC AUTO-ENTMCNC: 32.4 G/DL (ref 33–37)
MCV RBC AUTO: 88 FL (ref 80–94)
MONOCYTES ABSOLUTE: 0.9 K/UL (ref 0–0.9)
MONOCYTES RELATIVE PERCENT: 7.3 % (ref 0–10)
NEUTROPHILS ABSOLUTE: 9.2 K/UL (ref 1.5–7.5)
NEUTROPHILS RELATIVE PERCENT: 76.9 % (ref 50–65)
PDW BLD-RTO: 15 % (ref 11.5–14.5)
PLATELET # BLD: 198 K/UL (ref 130–400)
PMV BLD AUTO: 12.5 FL (ref 9.4–12.4)
POTASSIUM SERPL-SCNC: 4.4 MMOL/L (ref 3.5–5)
RBC # BLD: 5.43 M/UL (ref 4.7–6.1)
SODIUM BLD-SCNC: 141 MMOL/L (ref 136–145)
TOTAL PROTEIN: 7.9 G/DL (ref 6.6–8.7)
WBC # BLD: 11.9 K/UL (ref 4.8–10.8)

## 2021-03-01 RX ORDER — VERAPAMIL HYDROCHLORIDE 120 MG/1
120 CAPSULE, EXTENDED RELEASE ORAL NIGHTLY
Qty: 90 CAPSULE | Refills: 1 | Status: SHIPPED | OUTPATIENT
Start: 2021-03-01 | End: 2021-04-29 | Stop reason: ALTCHOICE

## 2021-03-01 NOTE — TELEPHONE ENCOUNTER
Mehdi George, with [facility] called stating she saw Mr. Colindres yesterday. He stated he wasn't feeling himself and wondered if he needed some blood work. Please advise.

## 2021-03-03 ENCOUNTER — TELEPHONE (OUTPATIENT)
Dept: FAMILY MEDICINE CLINIC | Age: 61
End: 2021-03-03

## 2021-03-04 ENCOUNTER — TELEPHONE (OUTPATIENT)
Dept: FAMILY MEDICINE CLINIC | Age: 61
End: 2021-03-04

## 2021-03-04 NOTE — TELEPHONE ENCOUNTER
Sven 45 Transitions Initial Follow Up Call    Call within 2 business days of discharge: Yes     Patient: Armen Ryder Patient : 1960   MRN: [id number]  Reason for Admission: N/A [facility]  Discharge Date: 10/23/20 RARS: Readmission Risk Score: 13       Spoke with: Patient    Discharge department/facility: [facility]    Non-face-to-face services provided:  Obtained and reviewed discharge summary and/or continuity of care documents    Follow Up  Future Appointments   Date Time Provider Olu Jo   3/16/2021 10:00 AM Dandy Bhatti MD [facility] [id number]       Pt is doing well and understands discharge instructions.  Pt does not need anything at this time and has F/U appt with Dr Sunny Rice

## 2021-03-04 NOTE — TELEPHONE ENCOUNTER
Spoke with Lee Mesa at [facility] to clarify Verapamil rx - Pt takes Verapamil 120 mg tablet once a day. Pt states he is not taking Belsomra, so there is no interaction issue. ICD 10 code provided for coverage. Case will be sent to review and decision.

## 2021-03-05 ENCOUNTER — TELEPHONE (OUTPATIENT)
Dept: FAMILY MEDICINE CLINIC | Age: 61
End: 2021-03-05

## 2021-03-05 NOTE — TELEPHONE ENCOUNTER
David Meyers requests that Dr. John Yo nurse return his call. Patient stated that he would like to get information about setting up a mobility chair exam and submitting for insurance approval. Dada Lang also asked that clinic change his prescription verapamil (VERELAN) 120 MG extended release capsule to a different type/tier due to the costs with [employer]. Dada Lang also has a question about taking diclofenac (VOLTAREN) 50 MG EC tablet. He stated that his pharmacy suggested him to not take it anymore to interactions to other medications he takes. The best time to reach him is Anytime. Thank you.

## 2021-03-07 DIAGNOSIS — G47.00 INSOMNIA, UNSPECIFIED TYPE: ICD-10-CM

## 2021-03-08 RX ORDER — TRAZODONE HYDROCHLORIDE 100 MG/1
TABLET ORAL
Qty: 180 TABLET | Refills: 1 | Status: SHIPPED | OUTPATIENT
Start: 2021-03-08 | End: 2021-04-29 | Stop reason: ALTCHOICE

## 2021-03-09 NOTE — TELEPHONE ENCOUNTER
Patient has received the Verapamil and that is fixed. Patient has an appointment on 3/16/2021 to discuss a mobility chair and walk in tub. Also, patient states the diclofenac was discontinued in the hospital this last time. He doesn't know why, but it helped with his ankle pain. He is wanting to know if he can restart it or if there is something else he can be rx'd. Please advise.

## 2021-03-09 NOTE — TELEPHONE ENCOUNTER
All anti-inflammatories can make you more prone to have a GI bleeding given his history of varices there will be a risk.   If he cannot tolerate the pain without diclofenac then he will have to take the risk

## 2021-03-11 ENCOUNTER — TELEPHONE (OUTPATIENT)
Dept: ADMINISTRATIVE | Age: 61
End: 2021-03-11

## 2021-03-15 ENCOUNTER — TELEPHONE (OUTPATIENT)
Dept: ADMINISTRATIVE | Age: 61
End: 2021-03-15

## 2021-03-15 NOTE — TELEPHONE ENCOUNTER
PATIENT IS WORRIED ABOUT GETTING HIS MEDICATION. HE IS ALMOST OUT.  (HE GETS IT THROUGH [employer])     He has an appointment tomorrow with Dr. Prasad Chung

## 2021-03-16 ENCOUNTER — TELEPHONE (OUTPATIENT)
Dept: ADMINISTRATIVE | Age: 61
End: 2021-03-16

## 2021-03-16 ENCOUNTER — OFFICE VISIT (OUTPATIENT)
Dept: FAMILY MEDICINE CLINIC | Age: 61
End: 2021-03-16
Payer: MEDICARE

## 2021-03-16 VITALS
DIASTOLIC BLOOD PRESSURE: 72 MMHG | BODY MASS INDEX: 33.5 KG/M2 | HEIGHT: 69 IN | TEMPERATURE: 97 F | HEART RATE: 69 BPM | SYSTOLIC BLOOD PRESSURE: 130 MMHG | WEIGHT: 226.2 LBS | OXYGEN SATURATION: 98 %

## 2021-03-16 DIAGNOSIS — R10.11 RUQ PAIN: Primary | ICD-10-CM

## 2021-03-16 DIAGNOSIS — I47.1 SVT (SUPRAVENTRICULAR TACHYCARDIA) (HCC): ICD-10-CM

## 2021-03-16 DIAGNOSIS — M19.90 OSTEOARTHRITIS, UNSPECIFIED OSTEOARTHRITIS TYPE, UNSPECIFIED SITE: ICD-10-CM

## 2021-03-16 DIAGNOSIS — R11.0 CHRONIC NAUSEA: ICD-10-CM

## 2021-03-16 DIAGNOSIS — K74.60 HEPATIC CIRRHOSIS, UNSPECIFIED HEPATIC CIRRHOSIS TYPE, UNSPECIFIED WHETHER ASCITES PRESENT (HCC): ICD-10-CM

## 2021-03-16 DIAGNOSIS — I25.10 CORONARY ARTERY DISEASE INVOLVING NATIVE HEART WITHOUT ANGINA PECTORIS, UNSPECIFIED VESSEL OR LESION TYPE: ICD-10-CM

## 2021-03-16 LAB
ALBUMIN SERPL-MCNC: 4.1 G/DL (ref 3.5–5.2)
ALP BLD-CCNC: 76 U/L (ref 40–130)
ALT SERPL-CCNC: 21 U/L (ref 5–41)
ANION GAP SERPL CALCULATED.3IONS-SCNC: 10 MMOL/L (ref 7–19)
AST SERPL-CCNC: 28 U/L (ref 5–40)
BILIRUB SERPL-MCNC: 1.4 MG/DL (ref 0.2–1.2)
BUN BLDV-MCNC: 14 MG/DL (ref 8–23)
CALCIUM SERPL-MCNC: 9.2 MG/DL (ref 8.8–10.2)
CHLORIDE BLD-SCNC: 98 MMOL/L (ref 98–111)
CO2: 27 MMOL/L (ref 22–29)
CREAT SERPL-MCNC: 0.9 MG/DL (ref 0.5–1.2)
GFR AFRICAN AMERICAN: >59
GFR NON-AFRICAN AMERICAN: >60
GLUCOSE BLD-MCNC: 130 MG/DL (ref 74–109)
HCT VFR BLD CALC: 41.8 % (ref 42–52)
HEMOGLOBIN: 14.1 G/DL (ref 14–18)
MCH RBC QN AUTO: 28.5 PG (ref 27–31)
MCHC RBC AUTO-ENTMCNC: 33.7 G/DL (ref 33–37)
MCV RBC AUTO: 84.4 FL (ref 80–94)
PDW BLD-RTO: 15.5 % (ref 11.5–14.5)
PLATELET # BLD: 116 K/UL (ref 130–400)
PMV BLD AUTO: 12.1 FL (ref 9.4–12.4)
POTASSIUM SERPL-SCNC: 3.7 MMOL/L (ref 3.5–5)
RBC # BLD: 4.95 M/UL (ref 4.7–6.1)
SODIUM BLD-SCNC: 135 MMOL/L (ref 136–145)
TOTAL PROTEIN: 7 G/DL (ref 6.6–8.7)
WBC # BLD: 8.3 K/UL (ref 4.8–10.8)

## 2021-03-16 PROCEDURE — 1036F TOBACCO NON-USER: CPT | Performed by: FAMILY MEDICINE

## 2021-03-16 PROCEDURE — G8417 CALC BMI ABV UP PARAM F/U: HCPCS | Performed by: FAMILY MEDICINE

## 2021-03-16 PROCEDURE — 3017F COLORECTAL CA SCREEN DOC REV: CPT | Performed by: FAMILY MEDICINE

## 2021-03-16 PROCEDURE — G8484 FLU IMMUNIZE NO ADMIN: HCPCS | Performed by: FAMILY MEDICINE

## 2021-03-16 PROCEDURE — 99214 OFFICE O/P EST MOD 30 MIN: CPT | Performed by: FAMILY MEDICINE

## 2021-03-16 PROCEDURE — G8428 CUR MEDS NOT DOCUMENT: HCPCS | Performed by: FAMILY MEDICINE

## 2021-03-16 RX ORDER — SPIRONOLACTONE 50 MG/1
50 TABLET, FILM COATED ORAL DAILY
Qty: 90 TABLET | Refills: 3 | Status: SHIPPED | OUTPATIENT
Start: 2021-03-16

## 2021-03-16 RX ORDER — SOTALOL HYDROCHLORIDE 80 MG/1
80 TABLET ORAL 2 TIMES DAILY
Qty: 180 TABLET | Refills: 3 | Status: SHIPPED | OUTPATIENT
Start: 2021-03-16

## 2021-03-16 RX ORDER — CELECOXIB 200 MG/1
200 CAPSULE ORAL DAILY
Qty: 90 CAPSULE | Refills: 3 | Status: SHIPPED | OUTPATIENT
Start: 2021-03-16

## 2021-03-16 RX ORDER — ONDANSETRON 4 MG/1
TABLET, ORALLY DISINTEGRATING ORAL
Qty: 30 TABLET | Refills: 1 | Status: SHIPPED | OUTPATIENT
Start: 2021-03-16 | End: 2021-06-02 | Stop reason: SDUPTHER

## 2021-03-16 RX ORDER — ISOSORBIDE MONONITRATE 30 MG/1
30 TABLET, EXTENDED RELEASE ORAL DAILY
Qty: 90 TABLET | Refills: 3 | Status: ON HOLD | OUTPATIENT
Start: 2021-03-16 | End: 2021-08-18 | Stop reason: HOSPADM

## 2021-03-16 ASSESSMENT — ENCOUNTER SYMPTOMS
EYES NEGATIVE: 1
RESPIRATORY NEGATIVE: 1
ALLERGIC/IMMUNOLOGIC NEGATIVE: 1
GASTROINTESTINAL NEGATIVE: 1

## 2021-03-16 NOTE — PROGRESS NOTES
SUBJECTIVE:    Patient ID: Franky Alvarado is a 59 y.o.male. HPI:   Patient here for hospital follow-up. Patient is a 78-year-old white male. He was admitted to [facility] secondary to right upper quadrant pain. Patients cardiac enzymes remain negative and ultrasound and HIDA scan of gallbladder came back negative. He was discharged home after . He have not complaining of any more right upper quadrant pain. He have chronic nausea. That have not changed. He continues to drink beer. He said that he have 1 or 2 a day only. Patient denies any more right upper quadrant pain. He takes a PPI. He has history of ulcers. He was taken off diclofenac secondary to that  but he complains of joint pain especially on the right foot. He want to try something different than diclofenac that is not on narcotic for pain control. He said that he used Celebrex in the past without problems. He also have history of cirrhosis of the liver and SVTs. He needs some refills on medications for that. He have an appointment to see GI next month. He sees GI in [place].        Past Medical History:   Diagnosis Date    Chronic back pain     Cirrhosis (HCC)     COPD (chronic obstructive pulmonary disease) (HCC)     GERD (gastroesophageal reflux disease)     Liver disease       Current Outpatient Medications   Medication Sig Dispense Refill    spironolactone (ALDACTONE) 50 MG tablet Take 1 tablet by mouth daily 90 tablet 3    isosorbide mononitrate (IMDUR) 30 MG extended release tablet Take 1 tablet by mouth daily 90 tablet 3    sotalol (BETAPACE) 80 MG tablet Take 1 tablet by mouth 2 times daily 180 tablet 3    celecoxib (CELEBREX) 200 MG capsule Take 1 capsule by mouth daily 90 capsule 3    traZODone (DESYREL) 100 MG tablet TAKE 2 TABLETS BY MOUTH EVERY DAY AT NIGHT 180 tablet 1    verapamil (VERELAN) 120 MG extended release capsule Take 1 capsule by mouth nightly 90 capsule 1    furosemide (LASIX) 20 MG tablet Take 1 tablet by mouth daily 508 tablet 3    folic acid (FOLVITE) 1 MG tablet Take 1 tablet by mouth daily 30 tablet 5    ondansetron (ZOFRAN-ODT) 4 MG disintegrating tablet Take 1 tablet by mouth every 4 hours as needed for Nausea or Vomiting 30 tablet 1    melatonin 10 MG CAPS capsule Take 2 capsules by mouth nightly 60 capsule 5    Lift Chair MISC by Does not apply route 1 each 0    albuterol sulfate (PROAIR RESPICLICK) 995 (90 Base) MCG/ACT aerosol powder inhalation Inhale 2 puffs into the lungs every 4 hours as needed for Wheezing or Shortness of Breath      Fluticasone-Umeclidin-Vilant (TRELEGY ELLIPTA IN) Inhale 1 puff into the lungs daily      betamethasone dipropionate 0.05 % lotion Apply topically 2 times daily. 1 Bottle 0    nitroGLYCERIN (NITROSTAT) 0.4 MG SL tablet Place 1 tablet under the tongue every 5 minutes as needed for Chest pain up to max of 3 total doses. If no relief after 1 dose, call 911. 25 tablet 3    tiZANidine (ZANAFLEX) 4 MG tablet TAKE 1 TO 2 TABLETS BY MOUTH EVERY 8 HOURS AS NEEDED 150 tablet 1    linaCLOtide (LINZESS) 72 MCG CAPS capsule Take 1 capsule by mouth every morning (before breakfast) Indications: gets samples 30 capsule 5    omeprazole (PRILOSEC) 40 MG delayed release capsule Take 40 mg by mouth daily      Ascorbic Acid (VITAMIN C) 1000 MG tablet Take 1,000 mg by mouth daily      Vitamin E 400 units TABS Take 450 Units by mouth daily      Ferrous Sulfate (IRON) 325 (65 Fe) MG TABS Take 325 mg by mouth daily 30 tablet 5    oxyCODONE (ROXICODONE) 5 MG immediate release tablet Take 1 tablet by mouth three times daily for 7 days. 1 tab BID x7 days, 1 tab Daily x7 days, then off 42 tablet 0     No current facility-administered medications for this visit. Allergies   Allergen Reactions    Bee Venom        Review of Systems   Constitutional: Negative. HENT: Negative. Eyes: Negative. Respiratory: Negative. Cardiovascular: Negative.     Gastrointestinal: Negative. Endocrine: Negative. Genitourinary: Negative. Musculoskeletal: Positive for arthralgias. Skin: Negative. Allergic/Immunologic: Negative. Neurological: Negative. Hematological: Negative. Psychiatric/Behavioral: Negative. OBJECTIVE:    Physical Exam  Vitals signs reviewed. Constitutional:       Appearance: Normal appearance. He is well-developed. He is obese. HENT:      Head: Normocephalic and atraumatic. Right Ear: Tympanic membrane, ear canal and external ear normal. There is no impacted cerumen. Left Ear: Tympanic membrane, ear canal and external ear normal. There is no impacted cerumen. Nose: Nose normal.      Mouth/Throat:      Lips: Pink. Mouth: Mucous membranes are moist.      Dentition: Normal dentition. Tongue: No lesions. Pharynx: Oropharynx is clear. Uvula midline. Tonsils: No tonsillar exudate or tonsillar abscesses. Eyes:      General: Lids are normal.         Right eye: No discharge. Left eye: No discharge. Extraocular Movements:      Right eye: Normal extraocular motion. Left eye: Normal extraocular motion. Conjunctiva/sclera: Conjunctivae normal.      Right eye: Right conjunctiva is not injected. Left eye: Left conjunctiva is not injected. Pupils: Pupils are equal, round, and reactive to light. Neck:      Musculoskeletal: Normal range of motion and neck supple. Thyroid: No thyromegaly. Vascular: No carotid bruit or JVD. Cardiovascular:      Rate and Rhythm: Normal rate and regular rhythm. Pulses:           Carotid pulses are 2+ on the right side and 2+ on the left side. Radial pulses are 2+ on the right side and 2+ on the left side. Heart sounds: Normal heart sounds, S1 normal and S2 normal. No murmur. Pulmonary:      Effort: Pulmonary effort is normal. No accessory muscle usage. Breath sounds: Normal breath sounds.    Abdominal:      General: Bowel sounds are normal. There is no distension or abdominal bruit. Palpations: Abdomen is soft. There is no mass. Tenderness: There is no abdominal tenderness. Hernia: No hernia is present. Musculoskeletal:      Right hip: He exhibits decreased range of motion. Left hip: He exhibits decreased range of motion. Cervical back: He exhibits decreased range of motion, tenderness, pain and spasm. Lumbar back: He exhibits decreased range of motion, tenderness, pain and spasm. Right lower leg: No edema. Left lower leg: No edema. Comments: Left leg splint. Lymphadenopathy:      Cervical:      Right cervical: No superficial cervical adenopathy. Left cervical: No superficial cervical adenopathy. Skin:     General: Skin is warm and dry. Coloration: Skin is not jaundiced or pale. Findings: No lesion or rash. Nails: There is no clubbing. Neurological:      Mental Status: He is alert and oriented to person, place, and time. Cranial Nerves: No facial asymmetry. Motor: No weakness or tremor. Coordination: Coordination normal.      Gait: Gait normal.      Deep Tendon Reflexes: Reflexes are normal and symmetric. Psychiatric:         Attention and Perception: Attention normal.         Mood and Affect: Mood normal.         Speech: Speech normal.         Behavior: Behavior normal.         Thought Content: Thought content normal.         Cognition and Memory: Memory normal.         Judgment: Judgment normal.        /72 (Site: Left Upper Arm, Position: Sitting, Cuff Size: Medium Adult)   Pulse 69   Temp 97 °F (36.1 °C) (Temporal)   Ht 5' 9\" (1.753 m)   Wt 226 lb 3.2 oz (102.6 kg)   SpO2 98%   BMI 33.40 kg/m²      ASSESSMENT:     Diagnosis Orders   1. RUQ painresolved    2. Chronic nauseamost likely secondary to liver disease    3.  Hepatic cirrhosis, unspecified hepatic cirrhosis type, unspecified whether ascites present (HCC)followed by GI spironolactone (ALDACTONE) 50 MG tablet    CBC    Comprehensive Metabolic Panel   4. SVT (supraventricular tachycardia) (HCC)stable sotalol (BETAPACE) 80 MG tablet   5. Coronary artery disease involving native heart without angina pectoris, unspecified vessel or lesion typestable isosorbide mononitrate (IMDUR) 30 MG extended release tablet   6. Osteoarthritis, unspecified osteoarthritis type, unspecified siteno control celecoxib (CELEBREX) 200 MG capsule        PLAN:    1. Records from [facility] reviewed today. 2.  Follow-up with GI  3. Continue medications and follow-up with GI  4./5. Continue medication and follow-up with cardiology  6. Discontinue diclofenac. Trial of Celebrex. Continue PPI. Follow-up with GI closely.   Follow-up 3 months

## 2021-03-17 ENCOUNTER — TELEPHONE (OUTPATIENT)
Dept: ADMINISTRATIVE | Age: 61
End: 2021-03-17

## 2021-03-23 ENCOUNTER — NURSE TRIAGE (OUTPATIENT)
Dept: OTHER | Facility: CLINIC | Age: 61
End: 2021-03-23

## 2021-03-23 ENCOUNTER — OFFICE VISIT (OUTPATIENT)
Dept: FAMILY MEDICINE CLINIC | Age: 61
End: 2021-03-23
Payer: MEDICARE

## 2021-03-23 VITALS — OXYGEN SATURATION: 98 % | HEART RATE: 88 BPM

## 2021-03-23 DIAGNOSIS — R05.9 COUGH: ICD-10-CM

## 2021-03-23 DIAGNOSIS — R43.2 LOSS OF TASTE: ICD-10-CM

## 2021-03-23 DIAGNOSIS — R19.7 ACUTE DIARRHEA: ICD-10-CM

## 2021-03-23 DIAGNOSIS — R06.02 SHORTNESS OF BREATH: ICD-10-CM

## 2021-03-23 DIAGNOSIS — R19.7 ACUTE DIARRHEA: Primary | ICD-10-CM

## 2021-03-23 LAB — SARS-COV-2, PCR: NOT DETECTED

## 2021-03-23 PROCEDURE — G8417 CALC BMI ABV UP PARAM F/U: HCPCS | Performed by: FAMILY MEDICINE

## 2021-03-23 PROCEDURE — 1036F TOBACCO NON-USER: CPT | Performed by: FAMILY MEDICINE

## 2021-03-23 PROCEDURE — 99214 OFFICE O/P EST MOD 30 MIN: CPT | Performed by: FAMILY MEDICINE

## 2021-03-23 PROCEDURE — G8427 DOCREV CUR MEDS BY ELIG CLIN: HCPCS | Performed by: FAMILY MEDICINE

## 2021-03-23 PROCEDURE — 3017F COLORECTAL CA SCREEN DOC REV: CPT | Performed by: FAMILY MEDICINE

## 2021-03-23 PROCEDURE — G8484 FLU IMMUNIZE NO ADMIN: HCPCS | Performed by: FAMILY MEDICINE

## 2021-03-23 ASSESSMENT — ENCOUNTER SYMPTOMS
RESPIRATORY NEGATIVE: 1
GASTROINTESTINAL NEGATIVE: 1
ALLERGIC/IMMUNOLOGIC NEGATIVE: 1
EYES NEGATIVE: 1

## 2021-03-23 NOTE — PROGRESS NOTES
SUBJECTIVE:    Patient ID: Irma Santos is a 59 y.o.male. HPI:   Patient here for acute illness. Patient is a 78-year-old white male. He has multiple medical problems including cirrhosis of the liver. He complains of a 1 week history of diarrhea and nausea. He also complains of cough congestion shortness of breath general malaise. Subjective fevers and chills. No skin rashes. Loss of smell and taste. No previous Covid vaccine. No previous COVID-19 infection. Patient have history of COPD.        Past Medical History:   Diagnosis Date    Chronic back pain     Cirrhosis (HCC)     COPD (chronic obstructive pulmonary disease) (HCC)     GERD (gastroesophageal reflux disease)     Liver disease       Current Outpatient Medications   Medication Sig Dispense Refill    spironolactone (ALDACTONE) 50 MG tablet Take 1 tablet by mouth daily 90 tablet 3    isosorbide mononitrate (IMDUR) 30 MG extended release tablet Take 1 tablet by mouth daily 90 tablet 3    sotalol (BETAPACE) 80 MG tablet Take 1 tablet by mouth 2 times daily 180 tablet 3    celecoxib (CELEBREX) 200 MG capsule Take 1 capsule by mouth daily 90 capsule 3    ondansetron (ZOFRAN-ODT) 4 MG disintegrating tablet TAKE ONE TABLET BY MOUTH EVERY FOUR HOURS AS NEEDED FOR NAUSEA OR VOMITING 30 tablet 1    traZODone (DESYREL) 100 MG tablet TAKE 2 TABLETS BY MOUTH EVERY DAY AT NIGHT 180 tablet 1    verapamil (VERELAN) 120 MG extended release capsule Take 1 capsule by mouth nightly 90 capsule 1    furosemide (LASIX) 20 MG tablet Take 1 tablet by mouth daily 673 tablet 3    folic acid (FOLVITE) 1 MG tablet Take 1 tablet by mouth daily 30 tablet 5    melatonin 10 MG CAPS capsule Take 2 capsules by mouth nightly 60 capsule 5    Lift Chair MISC by Does not apply route 1 each 0    albuterol sulfate (PROAIR RESPICLICK) 220 (90 Base) MCG/ACT aerosol powder inhalation Inhale 2 puffs into the lungs every 4 hours as needed for Wheezing or Shortness of Breath      Fluticasone-Umeclidin-Vilant (TRELEGY ELLIPTA IN) Inhale 1 puff into the lungs daily      betamethasone dipropionate 0.05 % lotion Apply topically 2 times daily. 1 Bottle 0    nitroGLYCERIN (NITROSTAT) 0.4 MG SL tablet Place 1 tablet under the tongue every 5 minutes as needed for Chest pain up to max of 3 total doses. If no relief after 1 dose, call 911. 25 tablet 3    tiZANidine (ZANAFLEX) 4 MG tablet TAKE 1 TO 2 TABLETS BY MOUTH EVERY 8 HOURS AS NEEDED 150 tablet 1    linaCLOtide (LINZESS) 72 MCG CAPS capsule Take 1 capsule by mouth every morning (before breakfast) Indications: gets samples 30 capsule 5    omeprazole (PRILOSEC) 40 MG delayed release capsule Take 40 mg by mouth daily      Ascorbic Acid (VITAMIN C) 1000 MG tablet Take 1,000 mg by mouth daily      Vitamin E 400 units TABS Take 450 Units by mouth daily      Ferrous Sulfate (IRON) 325 (65 Fe) MG TABS Take 325 mg by mouth daily 30 tablet 5    oxyCODONE (ROXICODONE) 5 MG immediate release tablet Take 1 tablet by mouth three times daily for 7 days. 1 tab BID x7 days, 1 tab Daily x7 days, then off 42 tablet 0     No current facility-administered medications for this visit. Allergies   Allergen Reactions    Bee Venom        Review of Systems   Constitutional: Negative. HENT: Negative. Eyes: Negative. Respiratory: Negative. Cardiovascular: Negative. Gastrointestinal: Negative. Endocrine: Negative. Genitourinary: Negative. Musculoskeletal: Negative. Skin: Negative. Allergic/Immunologic: Negative. Neurological: Negative. Hematological: Negative. Psychiatric/Behavioral: Negative. OBJECTIVE:    Physical Exam  Vitals signs reviewed. Constitutional:       Appearance: Normal appearance. He is well-developed. HENT:      Head: Normocephalic and atraumatic. Right Ear: Tympanic membrane, ear canal and external ear normal. There is no impacted cerumen.       Left Ear: Tympanic membrane, ear canal and external ear normal. There is no impacted cerumen. Nose: Nose normal.      Mouth/Throat:      Lips: Pink. Mouth: Mucous membranes are moist.      Dentition: Normal dentition. Tongue: No lesions. Pharynx: Oropharynx is clear. Uvula midline. Tonsils: No tonsillar exudate or tonsillar abscesses. Eyes:      General: Lids are normal.         Right eye: No discharge. Left eye: No discharge. Extraocular Movements:      Right eye: Normal extraocular motion. Left eye: Normal extraocular motion. Conjunctiva/sclera: Conjunctivae normal.      Right eye: Right conjunctiva is not injected. Left eye: Left conjunctiva is not injected. Pupils: Pupils are equal, round, and reactive to light. Neck:      Musculoskeletal: Normal range of motion and neck supple. Thyroid: No thyromegaly. Vascular: No carotid bruit or JVD. Cardiovascular:      Rate and Rhythm: Normal rate and regular rhythm. Pulses:           Carotid pulses are 2+ on the right side and 2+ on the left side. Radial pulses are 2+ on the right side and 2+ on the left side. Heart sounds: Normal heart sounds, S1 normal and S2 normal. No murmur. Pulmonary:      Effort: Pulmonary effort is normal. No accessory muscle usage. Breath sounds: Normal breath sounds. Abdominal:      General: Bowel sounds are normal. There is no distension or abdominal bruit. Palpations: Abdomen is soft. There is no mass. Tenderness: There is no abdominal tenderness. Hernia: No hernia is present. Musculoskeletal: Normal range of motion. Right lower leg: No edema. Left lower leg: No edema. Lymphadenopathy:      Cervical:      Right cervical: No superficial cervical adenopathy. Left cervical: No superficial cervical adenopathy. Skin:     General: Skin is warm and dry. Coloration: Skin is not jaundiced or pale. Findings: No lesion or rash.       Nails: There is no clubbing. Neurological:      Mental Status: He is alert and oriented to person, place, and time. Cranial Nerves: No facial asymmetry. Motor: No weakness or tremor. Coordination: Coordination normal.      Gait: Gait normal.      Deep Tendon Reflexes: Reflexes are normal and symmetric. Psychiatric:         Attention and Perception: Attention normal.         Mood and Affect: Mood normal.         Speech: Speech normal.         Behavior: Behavior normal.         Thought Content: Thought content normal.         Cognition and Memory: Memory normal.         Judgment: Judgment normal.        Pulse 88   SpO2 98%      ASSESSMENT:     Diagnosis Orders   1. Acute diarrhea  COVID-19   2. Cough  COVID-19   3. Shortness of breath  COVID-19   4. Loss of taste  COVID-19        PLAN:    1. COVID-19 infusion. Neutralizing antibody if positive by PCR. If negative will consider antibiotics and steroids.

## 2021-03-23 NOTE — TELEPHONE ENCOUNTER
Patient called Lilyan Bamberger at [facility])  with red flag complaint. Brief description of triage: See below    Triage indicates for patient to call PCP now. Care advice provided, patient verbalizes understanding; denies any other questions or concerns; instructed to call back for any new or worsening symptoms. Writer provided warm transfer to Janet Mansfield at [facility] for appointment scheduling. Attention Provider: Thank you for allowing me to participate in the care of your patient. The patient was connected to triage in response to information provided to the [facility]. Please do not respond through this encounter as the response is not directed to a shared pool. Reason for Disposition   [1] HIGH RISK patient (e.g., age > 59 years, diabetes, heart or lung disease, weak immune system) AND [2] new or worsening symptoms    Answer Assessment - Initial Assessment Questions  1. COVID-19 DIAGNOSIS: \"Who made your Coronavirus (COVID-19) diagnosis? \" \"Was it confirmed by a positive lab test?\" If not diagnosed by a HCP, ask \"Are there lots of cases (community spread) where you live? \" (See public health department website, if unsure)      No confirmed diagnosis; Not a lot of cases of Covid-19 in his community    2. COVID-19 EXPOSURE: \"Was there any known exposure to COVID before the symptoms began? \" CDC Definition of close contact: within 6 feet (2 meters) for a total of 15 minutes or more over a 24-hour period. Denies    3. ONSET: \"When did the COVID-19 symptoms start? \"       About a week ago    4. WORST SYMPTOM: \"What is your worst symptom? \" (e.g., cough, fever, shortness of breath, muscle aches)      Nausea    5. COUGH: \"Do you have a cough? \" If so, ask: \"How bad is the cough? \"        Yes, pt reports he usually has a dry cough and reports no change    6. FEVER: \"Do you have a fever? \" If so, ask: \"What is your temperature, how was it measured, and when did it start? \"      Yes, 100 measured with temporal thermometer    7. RESPIRATORY STATUS: \"Describe your breathing? \" (e.g., shortness of breath, wheezing, unable to speak)       Pt has COPD and reports no change in respiratory status    8. BETTER-SAME-WORSE: [place] you getting better, staying the same or getting worse compared to yesterday? \"  If getting worse, ask, \"In what way? \"      Worse- nausea has worsened    9. HIGH RISK DISEASE: \"Do you have any chronic medical problems? \" (e.g., asthma, heart or lung disease, weak immune system, obesity, etc.)      COPD, A-fib    10. PREGNANCY: \"Is there any chance you are pregnant? \" \"When was your last menstrual period? \"        NA    11. OTHER SYMPTOMS: \"Do you have any other symptoms? \"  (e.g., chills, fatigue, headache, loss of smell or taste, muscle pain, sore throat; new loss of smell or taste especially support the diagnosis of COVID-19)        Headache, loss of taste, low-grade fever, diarrhea    Protocols used: CORONAVIRUS (COVID-19) [facility]

## 2021-03-24 RX ORDER — OMEPRAZOLE 40 MG/1
40 CAPSULE, DELAYED RELEASE ORAL DAILY
Qty: 90 CAPSULE | Refills: 3 | Status: SHIPPED | OUTPATIENT
Start: 2021-03-24

## 2021-03-25 ENCOUNTER — TELEPHONE (OUTPATIENT)
Dept: PRIMARY CARE CLINIC | Age: 61
End: 2021-03-25

## 2021-03-25 DIAGNOSIS — R06.02 SHORTNESS OF BREATH: ICD-10-CM

## 2021-03-25 DIAGNOSIS — R19.7 ACUTE DIARRHEA: Primary | ICD-10-CM

## 2021-03-25 DIAGNOSIS — R05.9 COUGH: ICD-10-CM

## 2021-03-25 RX ORDER — METHYLPREDNISOLONE 4 MG/1
TABLET ORAL
Qty: 1 KIT | Refills: 0 | Status: SHIPPED | OUTPATIENT
Start: 2021-03-25 | End: 2021-03-31

## 2021-03-25 RX ORDER — CEPHALEXIN 500 MG/1
500 CAPSULE ORAL 3 TIMES DAILY
Qty: 30 CAPSULE | Refills: 0 | Status: SHIPPED | OUTPATIENT
Start: 2021-03-25 | End: 2021-04-04

## 2021-03-25 NOTE — TELEPHONE ENCOUNTER
Jakub Beaver left a v/m yesterday evening requesting a return call to discuss COVID test results please. Thank you.

## 2021-03-29 ENCOUNTER — NURSE TRIAGE (OUTPATIENT)
Dept: OTHER | Facility: CLINIC | Age: 61
End: 2021-03-29

## 2021-03-29 ENCOUNTER — OFFICE VISIT (OUTPATIENT)
Dept: FAMILY MEDICINE CLINIC | Age: 61
End: 2021-03-29
Payer: MEDICARE

## 2021-03-29 VITALS
WEIGHT: 232.6 LBS | OXYGEN SATURATION: 98 % | BODY MASS INDEX: 34.45 KG/M2 | HEIGHT: 69 IN | SYSTOLIC BLOOD PRESSURE: 126 MMHG | DIASTOLIC BLOOD PRESSURE: 74 MMHG | HEART RATE: 79 BPM | TEMPERATURE: 97.2 F

## 2021-03-29 DIAGNOSIS — K06.8 BLEEDING GUMS: ICD-10-CM

## 2021-03-29 DIAGNOSIS — R19.01 RUQ ABDOMINAL MASS: ICD-10-CM

## 2021-03-29 DIAGNOSIS — K06.8 BLEEDING GUMS: Primary | ICD-10-CM

## 2021-03-29 DIAGNOSIS — K74.60 HEPATIC CIRRHOSIS, UNSPECIFIED HEPATIC CIRRHOSIS TYPE, UNSPECIFIED WHETHER ASCITES PRESENT (HCC): ICD-10-CM

## 2021-03-29 LAB
ALBUMIN SERPL-MCNC: 4.1 G/DL (ref 3.5–5.2)
ALP BLD-CCNC: 86 U/L (ref 40–130)
ALT SERPL-CCNC: 50 U/L (ref 5–41)
ANION GAP SERPL CALCULATED.3IONS-SCNC: 11 MMOL/L (ref 7–19)
AST SERPL-CCNC: 39 U/L (ref 5–40)
BASOPHILS ABSOLUTE: 0 K/UL (ref 0–0.2)
BASOPHILS RELATIVE PERCENT: 0.2 % (ref 0–1)
BILIRUB SERPL-MCNC: 1.1 MG/DL (ref 0.2–1.2)
BUN BLDV-MCNC: 11 MG/DL (ref 8–23)
CALCIUM SERPL-MCNC: 9.1 MG/DL (ref 8.8–10.2)
CHLORIDE BLD-SCNC: 100 MMOL/L (ref 98–111)
CO2: 27 MMOL/L (ref 22–29)
CREAT SERPL-MCNC: 0.7 MG/DL (ref 0.5–1.2)
D DIMER: 0.33 UG/ML FEU (ref 0–0.48)
EOSINOPHILS ABSOLUTE: 0 K/UL (ref 0–0.6)
EOSINOPHILS RELATIVE PERCENT: 0.1 % (ref 0–5)
GFR AFRICAN AMERICAN: >59
GFR NON-AFRICAN AMERICAN: >60
GLUCOSE BLD-MCNC: 245 MG/DL (ref 74–109)
HCT VFR BLD CALC: 44.8 % (ref 42–52)
HEMOGLOBIN: 14.3 G/DL (ref 14–18)
IMMATURE GRANULOCYTES #: 0.2 K/UL
INR BLD: 1.14 (ref 0.88–1.18)
LYMPHOCYTES ABSOLUTE: 0.8 K/UL (ref 1.1–4.5)
LYMPHOCYTES RELATIVE PERCENT: 6.4 % (ref 20–40)
MCH RBC QN AUTO: 28.2 PG (ref 27–31)
MCHC RBC AUTO-ENTMCNC: 31.9 G/DL (ref 33–37)
MCV RBC AUTO: 88.4 FL (ref 80–94)
MONOCYTES ABSOLUTE: 0.7 K/UL (ref 0–0.9)
MONOCYTES RELATIVE PERCENT: 6.1 % (ref 0–10)
NEUTROPHILS ABSOLUTE: 10.5 K/UL (ref 1.5–7.5)
NEUTROPHILS RELATIVE PERCENT: 85.8 % (ref 50–65)
PDW BLD-RTO: 15.3 % (ref 11.5–14.5)
PLATELET # BLD: 118 K/UL (ref 130–400)
PMV BLD AUTO: 11.8 FL (ref 9.4–12.4)
POTASSIUM SERPL-SCNC: 4 MMOL/L (ref 3.5–5)
PROTHROMBIN TIME: 14.6 SEC (ref 12–14.6)
RBC # BLD: 5.07 M/UL (ref 4.7–6.1)
SODIUM BLD-SCNC: 138 MMOL/L (ref 136–145)
TOTAL PROTEIN: 7 G/DL (ref 6.6–8.7)
WBC # BLD: 12.2 K/UL (ref 4.8–10.8)

## 2021-03-29 PROCEDURE — G8417 CALC BMI ABV UP PARAM F/U: HCPCS | Performed by: FAMILY MEDICINE

## 2021-03-29 PROCEDURE — G8484 FLU IMMUNIZE NO ADMIN: HCPCS | Performed by: FAMILY MEDICINE

## 2021-03-29 PROCEDURE — G8427 DOCREV CUR MEDS BY ELIG CLIN: HCPCS | Performed by: FAMILY MEDICINE

## 2021-03-29 PROCEDURE — 3017F COLORECTAL CA SCREEN DOC REV: CPT | Performed by: FAMILY MEDICINE

## 2021-03-29 PROCEDURE — 99214 OFFICE O/P EST MOD 30 MIN: CPT | Performed by: FAMILY MEDICINE

## 2021-03-29 PROCEDURE — 1036F TOBACCO NON-USER: CPT | Performed by: FAMILY MEDICINE

## 2021-03-29 ASSESSMENT — PATIENT HEALTH QUESTIONNAIRE - PHQ9: SUM OF ALL RESPONSES TO PHQ QUESTIONS 1-9: 0

## 2021-03-29 NOTE — TELEPHONE ENCOUNTER
C/o gums bleeding. Reason for Disposition   Patient wants to be seen    Answer Assessment - Initial Assessment Questions  1. SYMPTOM: \"What's the main symptom you're concerned about? \" (e.g., dry mouth. chapped lips, lump)      Woke up this morning and his gums bleeding. 2. ONSET: \"When did the  bleeding gums  start? \"      This morning. 3. PAIN: \"Is there any pain? \" If so, ask: \"How bad is it? \" (Scale: 1-10; mild, moderate, severe)      No     4. CAUSE: \"What do you think is causing the symptoms? \"      Went online and saw it could be anything from diabetes to lukemia     5. OTHER SYMPTOMS: \"Do you have any other symptoms? \" (e.g., fever, sore throat, toothache, swelling)     Sense of taste not back to normal.     6. PREGNANCY: \"Is there any chance you are pregnant? \" \"When was your last menstrual period? \"      *No Answer*    Protocols used: MOUTH SYMPTOMS-ADULT-[place]    Received call from Lizet 26 at pre-service center [facility]) [address] with Red Flag Complaint. Brief description of triage: bleeding gums that started this morning. Noticed it when he spit. Triage indicates for patient to be seen within the next 3 days. Care advice provided, patient verbalizes understanding; denies any other questions or concerns; instructed to call back for any new or worsening symptoms. Writer provided warm transfer to [place] at [facility] for appointment scheduling. Attention Provider: Thank you for allowing me to participate in the care of your patient. The patient was connected to triage in response to information provided to the [facility]. Please do not respond through this encounter as the response is not directed to a shared pool.

## 2021-03-29 NOTE — PROGRESS NOTES
SUBJECTIVE:    Patient ID: Pierre Halsted is a 59 y.o.male. HPI:   Patient here for evaluation of bleeding gums  Patient is a 64-year-old white male. He complains of bleeding gums this morning. He said that he had a small amount of blood in his mouth he thought it was coming from his gums. He has some right upper quadrant pain. But he have upper quadrant pain a while ago. He does appear to have a mass in the right upper quadrant area. Denies any black stools or bloody stools. He have history of varices and cirrhosis. Denies any new shortness of breath. He have an upper respiratory infection last week he stays weak according to him. He have an appointment to see GI the near future. Past Medical History:   Diagnosis Date    Chronic back pain     Cirrhosis (HCC)     COPD (chronic obstructive pulmonary disease) (HCC)     GERD (gastroesophageal reflux disease)     Liver disease       Current Outpatient Medications   Medication Sig Dispense Refill    methylPREDNISolone (MEDROL DOSEPACK) 4 MG tablet Take by mouth.  1 kit 0    cephALEXin (KEFLEX) 500 MG capsule Take 1 capsule by mouth 3 times daily for 10 days 30 capsule 0    omeprazole (PRILOSEC) 40 MG delayed release capsule Take 1 capsule by mouth daily 90 capsule 3    spironolactone (ALDACTONE) 50 MG tablet Take 1 tablet by mouth daily 90 tablet 3    isosorbide mononitrate (IMDUR) 30 MG extended release tablet Take 1 tablet by mouth daily 90 tablet 3    sotalol (BETAPACE) 80 MG tablet Take 1 tablet by mouth 2 times daily 180 tablet 3    celecoxib (CELEBREX) 200 MG capsule Take 1 capsule by mouth daily 90 capsule 3    ondansetron (ZOFRAN-ODT) 4 MG disintegrating tablet TAKE ONE TABLET BY MOUTH EVERY FOUR HOURS AS NEEDED FOR NAUSEA OR VOMITING 30 tablet 1    traZODone (DESYREL) 100 MG tablet TAKE 2 TABLETS BY MOUTH EVERY DAY AT NIGHT 180 tablet 1    verapamil (VERELAN) 120 MG extended release capsule Take 1 capsule by mouth nightly 90 capsule 1    furosemide (LASIX) 20 MG tablet Take 1 tablet by mouth daily 520 tablet 3    folic acid (FOLVITE) 1 MG tablet Take 1 tablet by mouth daily 30 tablet 5    melatonin 10 MG CAPS capsule Take 2 capsules by mouth nightly 60 capsule 5    Lift Chair MISC by Does not apply route 1 each 0    albuterol sulfate (PROAIR RESPICLICK) 990 (90 Base) MCG/ACT aerosol powder inhalation Inhale 2 puffs into the lungs every 4 hours as needed for Wheezing or Shortness of Breath      Fluticasone-Umeclidin-Vilant (TRELEGY ELLIPTA IN) Inhale 1 puff into the lungs daily      betamethasone dipropionate 0.05 % lotion Apply topically 2 times daily. 1 Bottle 0    nitroGLYCERIN (NITROSTAT) 0.4 MG SL tablet Place 1 tablet under the tongue every 5 minutes as needed for Chest pain up to max of 3 total doses. If no relief after 1 dose, call 911. 25 tablet 3    tiZANidine (ZANAFLEX) 4 MG tablet TAKE 1 TO 2 TABLETS BY MOUTH EVERY 8 HOURS AS NEEDED 150 tablet 1    linaCLOtide (LINZESS) 72 MCG CAPS capsule Take 1 capsule by mouth every morning (before breakfast) Indications: gets samples 30 capsule 5    Ascorbic Acid (VITAMIN C) 1000 MG tablet Take 1,000 mg by mouth daily      Vitamin E 400 units TABS Take 450 Units by mouth daily      Ferrous Sulfate (IRON) 325 (65 Fe) MG TABS Take 325 mg by mouth daily 30 tablet 5    oxyCODONE (ROXICODONE) 5 MG immediate release tablet Take 1 tablet by mouth three times daily for 7 days. 1 tab BID x7 days, 1 tab Daily x7 days, then off 42 tablet 0     No current facility-administered medications for this visit. Allergies   Allergen Reactions    Bee Venom        Review of Systems   Constitutional: Negative. HENT: Negative. Eyes: Negative. Respiratory: Negative. Cardiovascular: Negative. Gastrointestinal: Negative. Endocrine: Negative. Genitourinary: Negative. Musculoskeletal: Negative. Skin: Negative. Allergic/Immunologic: Negative.     Neurological: Positive for weakness. Hematological: Negative. Psychiatric/Behavioral: Negative. OBJECTIVE:    Physical Exam  Vitals signs reviewed. Constitutional:       Appearance: Normal appearance. He is well-developed. HENT:      Head: Normocephalic and atraumatic. Right Ear: Tympanic membrane, ear canal and external ear normal. There is no impacted cerumen. Left Ear: Tympanic membrane, ear canal and external ear normal. There is no impacted cerumen. Nose: Nose normal.      Mouth/Throat:      Lips: Pink. Mouth: Mucous membranes are moist.      Dentition: Normal dentition. Tongue: No lesions. Pharynx: Oropharynx is clear. Uvula midline. Tonsils: No tonsillar exudate or tonsillar abscesses. Eyes:      General: Lids are normal.         Right eye: No discharge. Left eye: No discharge. Extraocular Movements:      Right eye: Normal extraocular motion. Left eye: Normal extraocular motion. Conjunctiva/sclera: Conjunctivae normal.      Right eye: Right conjunctiva is not injected. Left eye: Left conjunctiva is not injected. Pupils: Pupils are equal, round, and reactive to light. Neck:      Musculoskeletal: Normal range of motion and neck supple. Thyroid: No thyromegaly. Vascular: No carotid bruit or JVD. Cardiovascular:      Rate and Rhythm: Normal rate and regular rhythm. Pulses:           Carotid pulses are 2+ on the right side and 2+ on the left side. Radial pulses are 2+ on the right side and 2+ on the left side. Heart sounds: Normal heart sounds, S1 normal and S2 normal. No murmur. Pulmonary:      Effort: Pulmonary effort is normal. No accessory muscle usage. Breath sounds: Normal breath sounds. Abdominal:      General: Bowel sounds are normal. There is no distension or abdominal bruit. Palpations: Abdomen is soft. There is no mass (Right upper quadrant). Tenderness: There is no abdominal tenderness. Hernia: No hernia is present. Musculoskeletal: Normal range of motion. Right lower leg: No edema. Left lower leg: No edema. Lymphadenopathy:      Cervical:      Right cervical: No superficial cervical adenopathy. Left cervical: No superficial cervical adenopathy. Skin:     General: Skin is warm and dry. Coloration: Skin is not jaundiced or pale. Findings: No lesion or rash. Nails: There is no clubbing. Neurological:      Mental Status: He is alert and oriented to person, place, and time. Cranial Nerves: No facial asymmetry. Motor: No weakness or tremor. Coordination: Coordination normal.      Gait: Gait normal.      Deep Tendon Reflexes: Reflexes are normal and symmetric. Psychiatric:         Attention and Perception: Attention normal.         Mood and Affect: Mood normal.         Speech: Speech normal.         Behavior: Behavior normal.         Thought Content: Thought content normal.         Cognition and Memory: Memory normal.         Judgment: Judgment normal.        /74 (Site: Left Upper Arm, Position: Sitting, Cuff Size: Medium Adult)   Pulse 79   Temp 97.2 °F (36.2 °C) (Temporal)   Ht 5' 9\" (1.753 m)   Wt 232 lb 9.6 oz (105.5 kg)   SpO2 98%   BMI 34.35 kg/m²      ASSESSMENT:     Diagnosis Orders   1. Bleeding gums versus PE versus GI CBC Auto Differential    Comprehensive Metabolic Panel    Protime-INR    APTT    D-Dimer, Quantitative   2. RUQ abdominal masscirrhosis? Versus liver mass CT ABDOMEN PELVIS WO CONTRAST Additional Contrast? None   3. Hepatic cirrhosis, unspecified hepatic cirrhosis type, unspecified whether ascites present (HCC)ongoing Protime-INR    APTT        PLAN:    1. Blood work. At this point no active bleed no ACE. We will check for PE if D-dimer elevated. 2.  CT of the abdomen and pelvis without contrast  3. Follow-up with GI. Coagulation markers. No alcohol.   Follow-up after test

## 2021-03-30 ENCOUNTER — TELEPHONE (OUTPATIENT)
Dept: FAMILY MEDICINE CLINIC | Age: 61
End: 2021-03-30

## 2021-03-30 NOTE — TELEPHONE ENCOUNTER
Pt states has an appointment to see GI on 04/06/2021. He states he is still not feeling well. States it is worse in the evenings.

## 2021-03-30 NOTE — TELEPHONE ENCOUNTER
----- Message from Placido Velez MD sent at 3/30/2021  7:46 AM CDT -----  Ok - within normal limits or stable for patient. Re check next due date. - set w gi for endo

## 2021-04-01 DIAGNOSIS — G89.29 CHRONIC BACK PAIN, UNSPECIFIED BACK LOCATION, UNSPECIFIED BACK PAIN LATERALITY: ICD-10-CM

## 2021-04-01 DIAGNOSIS — M54.9 CHRONIC BACK PAIN, UNSPECIFIED BACK LOCATION, UNSPECIFIED BACK PAIN LATERALITY: ICD-10-CM

## 2021-04-01 RX ORDER — TIZANIDINE 4 MG/1
TABLET ORAL
Qty: 270 TABLET | Refills: 1 | Status: SHIPPED | OUTPATIENT
Start: 2021-04-01

## 2021-04-05 ENCOUNTER — TELEPHONE (OUTPATIENT)
Dept: ADMINISTRATIVE | Age: 61
End: 2021-04-05

## 2021-04-05 NOTE — TELEPHONE ENCOUNTER
Mr. Abarca Kiki is supposed to have a test done before tomorrow and was hoping to get a call back asa. [facility] for his gastro appt tomorrow.

## 2021-04-07 ENCOUNTER — HOSPITAL ENCOUNTER (OUTPATIENT)
Dept: GENERAL RADIOLOGY | Age: 61
Discharge: HOME | End: 2021-04-07
Payer: MEDICARE

## 2021-04-07 DIAGNOSIS — R19.01 RUQ ABDOMINAL MASS: ICD-10-CM

## 2021-04-07 PROCEDURE — 74176 CT ABD & PELVIS W/O CONTRAST: CPT

## 2021-04-09 ENCOUNTER — TELEPHONE (OUTPATIENT)
Dept: FAMILY MEDICINE CLINIC | Age: 61
End: 2021-04-09

## 2021-04-09 NOTE — TELEPHONE ENCOUNTER
Eri Tubbs would like a call to discuss his Imaging results. his preferred call back time is  Anytime. His gastrologist's Fax number is  [phone]. Thank you!

## 2021-04-13 ENCOUNTER — TELEPHONE (OUTPATIENT)
Dept: FAMILY MEDICINE CLINIC | Age: 61
End: 2021-04-13

## 2021-04-13 DIAGNOSIS — N40.0 PROSTATE ENLARGEMENT: Primary | ICD-10-CM

## 2021-04-14 DIAGNOSIS — N40.0 PROSTATE ENLARGEMENT: ICD-10-CM

## 2021-04-14 LAB — PROSTATE SPECIFIC ANTIGEN: 0.74 NG/ML (ref 0–4)

## 2021-04-29 ENCOUNTER — NURSE TRIAGE (OUTPATIENT)
Dept: OTHER | Facility: CLINIC | Age: 61
End: 2021-04-29

## 2021-04-29 ENCOUNTER — OFFICE VISIT (OUTPATIENT)
Dept: FAMILY MEDICINE CLINIC | Age: 61
End: 2021-04-29
Payer: MEDICARE

## 2021-04-29 VITALS
WEIGHT: 244 LBS | TEMPERATURE: 97.3 F | HEART RATE: 66 BPM | OXYGEN SATURATION: 96 % | HEIGHT: 69 IN | DIASTOLIC BLOOD PRESSURE: 68 MMHG | SYSTOLIC BLOOD PRESSURE: 124 MMHG | BODY MASS INDEX: 36.14 KG/M2

## 2021-04-29 DIAGNOSIS — R00.1 BRADYCARDIA: Primary | ICD-10-CM

## 2021-04-29 DIAGNOSIS — R90.89 ABNORMAL BRAIN MRI: ICD-10-CM

## 2021-04-29 DIAGNOSIS — I47.1 SVT (SUPRAVENTRICULAR TACHYCARDIA) (HCC): ICD-10-CM

## 2021-04-29 DIAGNOSIS — R43.0 LOSS OF SENSE OF SMELL: ICD-10-CM

## 2021-04-29 DIAGNOSIS — R00.1 BRADYCARDIA: ICD-10-CM

## 2021-04-29 PROBLEM — R00.2 PALPITATIONS: Status: RESOLVED | Noted: 2020-11-12 | Resolved: 2021-04-29

## 2021-04-29 PROBLEM — R00.0 RACING HEART BEAT: Status: RESOLVED | Noted: 2020-11-12 | Resolved: 2021-04-29

## 2021-04-29 LAB
ALBUMIN SERPL-MCNC: 4.1 G/DL (ref 3.5–5.2)
ALP BLD-CCNC: 81 U/L (ref 40–130)
ALT SERPL-CCNC: 24 U/L (ref 5–41)
ANION GAP SERPL CALCULATED.3IONS-SCNC: 16 MMOL/L (ref 7–19)
AST SERPL-CCNC: 28 U/L (ref 5–40)
BILIRUB SERPL-MCNC: 0.7 MG/DL (ref 0.2–1.2)
BUN BLDV-MCNC: 14 MG/DL (ref 8–23)
CALCIUM SERPL-MCNC: 9.2 MG/DL (ref 8.8–10.2)
CHLORIDE BLD-SCNC: 102 MMOL/L (ref 98–111)
CO2: 20 MMOL/L (ref 22–29)
CREAT SERPL-MCNC: 1.2 MG/DL (ref 0.5–1.2)
GFR AFRICAN AMERICAN: >59
GFR NON-AFRICAN AMERICAN: >60
GLUCOSE BLD-MCNC: 102 MG/DL (ref 74–109)
HCT VFR BLD CALC: 39.2 % (ref 42–52)
HEMOGLOBIN: 12.7 G/DL (ref 14–18)
MCH RBC QN AUTO: 29 PG (ref 27–31)
MCHC RBC AUTO-ENTMCNC: 32.4 G/DL (ref 33–37)
MCV RBC AUTO: 89.5 FL (ref 80–94)
PDW BLD-RTO: 16.9 % (ref 11.5–14.5)
PLATELET # BLD: 114 K/UL (ref 130–400)
PMV BLD AUTO: 12.3 FL (ref 9.4–12.4)
POTASSIUM SERPL-SCNC: 4.7 MMOL/L (ref 3.5–5)
RBC # BLD: 4.38 M/UL (ref 4.7–6.1)
SODIUM BLD-SCNC: 138 MMOL/L (ref 136–145)
TOTAL PROTEIN: 7.1 G/DL (ref 6.6–8.7)
TSH SERPL DL<=0.05 MIU/L-ACNC: 1.27 UIU/ML (ref 0.27–4.2)
VITAMIN B-12: 636 PG/ML (ref 211–946)
WBC # BLD: 8 K/UL (ref 4.8–10.8)

## 2021-04-29 PROCEDURE — 99214 OFFICE O/P EST MOD 30 MIN: CPT | Performed by: FAMILY MEDICINE

## 2021-04-29 PROCEDURE — 1036F TOBACCO NON-USER: CPT | Performed by: FAMILY MEDICINE

## 2021-04-29 PROCEDURE — G8417 CALC BMI ABV UP PARAM F/U: HCPCS | Performed by: FAMILY MEDICINE

## 2021-04-29 PROCEDURE — G8427 DOCREV CUR MEDS BY ELIG CLIN: HCPCS | Performed by: FAMILY MEDICINE

## 2021-04-29 PROCEDURE — 3017F COLORECTAL CA SCREEN DOC REV: CPT | Performed by: FAMILY MEDICINE

## 2021-04-29 RX ORDER — VERAPAMIL HYDROCHLORIDE 120 MG/1
120 CAPSULE, EXTENDED RELEASE ORAL NIGHTLY
Qty: 90 CAPSULE | Refills: 1 | Status: ON HOLD
Start: 2021-04-29 | End: 2021-08-18 | Stop reason: HOSPADM

## 2021-04-29 ASSESSMENT — ENCOUNTER SYMPTOMS
GASTROINTESTINAL NEGATIVE: 1
RESPIRATORY NEGATIVE: 1
EYES NEGATIVE: 1
ALLERGIC/IMMUNOLOGIC NEGATIVE: 1

## 2021-04-29 NOTE — PROGRESS NOTES
SUBJECTIVE:    Patient ID: Gladys Flores is a 59 y.o.male. HPI:   Here for follow-up of multiple medical problems  Patient is a 18-year-old white male. He has history of SVTs and cirrhosis of the liver. He is on verapamil and Betapace. He have an episode where his heart rate got in the 40s and his blood pressure was low. He feels a little sick for a little bit 10 days since self resolved. He have a heart cath last October. He has been seen cardiology in [facility]. He feels back to normal now. He also have an abnormal MRI of the brain last year. He have his arachnoid cyst is causing a mass-effect. He complains of loss of taste for the last couple of months. He got tested for COVID-19.        Past Medical History:   Diagnosis Date    Chronic back pain     Cirrhosis (HCC)     COPD (chronic obstructive pulmonary disease) (HCC)     GERD (gastroesophageal reflux disease)     Liver disease       Current Outpatient Medications   Medication Sig Dispense Refill    verapamil (VERELAN) 120 MG extended release capsule Take 1 capsule by mouth nightly 90 capsule 1    tiZANidine (ZANAFLEX) 4 MG tablet TAKE 1 TO 2 TABLETS BY MOUTH EVERY 8 HOURS AS NEEDED 270 tablet 1    omeprazole (PRILOSEC) 40 MG delayed release capsule Take 1 capsule by mouth daily 90 capsule 3    spironolactone (ALDACTONE) 50 MG tablet Take 1 tablet by mouth daily 90 tablet 3    isosorbide mononitrate (IMDUR) 30 MG extended release tablet Take 1 tablet by mouth daily 90 tablet 3    sotalol (BETAPACE) 80 MG tablet Take 1 tablet by mouth 2 times daily 180 tablet 3    celecoxib (CELEBREX) 200 MG capsule Take 1 capsule by mouth daily 90 capsule 3    ondansetron (ZOFRAN-ODT) 4 MG disintegrating tablet TAKE ONE TABLET BY MOUTH EVERY FOUR HOURS AS NEEDED FOR NAUSEA OR VOMITING 30 tablet 1    furosemide (LASIX) 20 MG tablet Take 1 tablet by mouth daily 040 tablet 3    folic acid (FOLVITE) 1 MG tablet Take 1 tablet by mouth daily 30 tablet 5  albuterol sulfate (PROAIR RESPICLICK) 339 (90 Base) MCG/ACT aerosol powder inhalation Inhale 2 puffs into the lungs every 4 hours as needed for Wheezing or Shortness of Breath      Fluticasone-Umeclidin-Vilant (TRELEGY ELLIPTA IN) Inhale 1 puff into the lungs daily      betamethasone dipropionate 0.05 % lotion Apply topically 2 times daily. 1 Bottle 0    nitroGLYCERIN (NITROSTAT) 0.4 MG SL tablet Place 1 tablet under the tongue every 5 minutes as needed for Chest pain up to max of 3 total doses. If no relief after 1 dose, call 911. 25 tablet 3    linaCLOtide (LINZESS) 72 MCG CAPS capsule Take 1 capsule by mouth every morning (before breakfast) Indications: gets samples 30 capsule 5     No current facility-administered medications for this visit. Allergies   Allergen Reactions    Bee Venom        Review of Systems   Constitutional: Negative. HENT: Negative. Eyes: Negative. Respiratory: Negative. Cardiovascular: Negative. Gastrointestinal: Negative. Endocrine: Negative. Genitourinary: Negative. Musculoskeletal: Negative. Skin: Negative. Allergic/Immunologic: Negative. Neurological: Negative. Hematological: Negative. Psychiatric/Behavioral: Negative. OBJECTIVE:    Physical Exam  Vitals signs reviewed. Constitutional:       Appearance: Normal appearance. He is well-developed. HENT:      Head: Normocephalic and atraumatic. Right Ear: Tympanic membrane, ear canal and external ear normal. There is no impacted cerumen. Left Ear: Tympanic membrane, ear canal and external ear normal. There is no impacted cerumen. Nose: Nose normal.      Mouth/Throat:      Lips: Pink. Mouth: Mucous membranes are moist.      Dentition: Normal dentition. Tongue: No lesions. Pharynx: Oropharynx is clear. Uvula midline. Tonsils: No tonsillar exudate or tonsillar abscesses. Eyes:      General: Lids are normal.         Right eye: No discharge. Left eye: No discharge. Extraocular Movements:      Right eye: Normal extraocular motion. Left eye: Normal extraocular motion. Conjunctiva/sclera: Conjunctivae normal.      Right eye: Right conjunctiva is not injected. Left eye: Left conjunctiva is not injected. Pupils: Pupils are equal, round, and reactive to light. Neck:      Musculoskeletal: Normal range of motion and neck supple. Thyroid: No thyromegaly. Vascular: No carotid bruit or JVD. Cardiovascular:      Rate and Rhythm: Normal rate and regular rhythm. Pulses:           Carotid pulses are 2+ on the right side and 2+ on the left side. Radial pulses are 2+ on the right side and 2+ on the left side. Heart sounds: Normal heart sounds, S1 normal and S2 normal. No murmur. Pulmonary:      Effort: Pulmonary effort is normal. No accessory muscle usage. Breath sounds: Normal breath sounds. Abdominal:      General: Bowel sounds are normal. There is no distension or abdominal bruit. Palpations: Abdomen is soft. There is no mass. Tenderness: There is no abdominal tenderness. Hernia: No hernia is present. Musculoskeletal: Normal range of motion. Right lower leg: No edema. Left lower leg: No edema. Lymphadenopathy:      Cervical:      Right cervical: No superficial cervical adenopathy. Left cervical: No superficial cervical adenopathy. Skin:     General: Skin is warm and dry. Coloration: Skin is not jaundiced or pale. Findings: No lesion or rash. Nails: There is no clubbing. Neurological:      Mental Status: He is alert and oriented to person, place, and time. Cranial Nerves: No facial asymmetry. Motor: No weakness or tremor. Coordination: Coordination normal.      Gait: Gait normal.      Deep Tendon Reflexes: Reflexes are normal and symmetric.    Psychiatric:         Attention and Perception: Attention normal.         Mood and Affect: Mood normal.         Speech: Speech normal.         Behavior: Behavior normal.         Thought Content: Thought content normal.         Cognition and Memory: Memory normal.         Judgment: Judgment normal.        /68 (Site: Left Upper Arm, Position: Sitting, Cuff Size: Medium Adult)   Pulse 66   Temp 97.3 °F (36.3 °C) (Temporal)   Ht 5' 9\" (1.753 m)   Wt 244 lb (110.7 kg)   SpO2 96%   BMI 36.03 kg/m²      ASSESSMENT:     Diagnosis Orders   1. Bradycardiaresolved but need to investigate worrisome for heart block CBC    Comprehensive Metabolic Panel    Longterm Continuous Cardiac Event Monitor    TSH without Reflex   2. SVT (supraventricular tachycardia) (HCC)stable verapamil (VERELAN) 120 MG extended release capsule   3. Loss of sense of smelletiology unknown MRI BRAIN W WO CONTRAST   4. Abnormal brain MRIneeds follow-up MRI BRAIN W WO CONTRAST        PLAN:    1. Blood work. Heart monitor  2. Heart monitor. Continue medications. Follow-up with cardiology. 3/4.   MRI of the brain  Follow-up after test

## 2021-05-19 ENCOUNTER — TELEPHONE (OUTPATIENT)
Dept: ADMINISTRATIVE | Age: 61
End: 2021-05-19

## 2021-05-19 ENCOUNTER — HOSPITAL ENCOUNTER (OUTPATIENT)
Dept: NON INVASIVE DIAGNOSTICS | Age: 61
Discharge: HOME | End: 2021-05-19
Payer: MEDICARE

## 2021-05-19 DIAGNOSIS — R00.1 BRADYCARDIA: ICD-10-CM

## 2021-05-19 PROCEDURE — 93246 EXT ECG>7D<15D RECORDING: CPT

## 2021-06-02 RX ORDER — ONDANSETRON 4 MG/1
TABLET, ORALLY DISINTEGRATING ORAL
Qty: 90 TABLET | Refills: 1 | Status: SHIPPED | OUTPATIENT
Start: 2021-06-02

## 2021-06-08 ENCOUNTER — VIRTUAL VISIT (OUTPATIENT)
Dept: FAMILY MEDICINE CLINIC | Age: 61
End: 2021-06-08
Payer: MEDICARE

## 2021-06-08 DIAGNOSIS — M79.672 LEFT FOOT PAIN: Primary | ICD-10-CM

## 2021-06-08 PROCEDURE — 99442 PR PHYS/QHP TELEPHONE EVALUATION 11-20 MIN: CPT | Performed by: FAMILY MEDICINE

## 2021-06-08 NOTE — PROGRESS NOTES
Delaney Davis is a 59 y.o. male evaluated via telephone on 6/8/2021. Consent:  He and/or health care decision maker is aware that that he may receive a bill for this telephone service, depending on his insurance coverage, and has provided verbal consent to proceed: Yes      Documentation:  I communicated with the patient and/or health care decision maker about patient is a 56-year-old white male. He have a fracture of his left foot 6 months ago. He still complains of swelling and pain. He does not want narcotics. He takes Celebrex daily. He also been taking Tylenol. He does not know what else to do. Pain worse with swelling. .   Details of this discussion including any medical advice provided: Compression stockings. Voltaren gel. Ortho consult as needed      I affirm this is a Patient Initiated Episode with a Patient who has not had a related appointment within my department in the past 7 days or scheduled within the next 24 hours. Patient identification was verified at the start of the visit: Yes    Total Time: minutes: 11-20 minutes    The visit was conducted pursuant to the emergency declaration under the [facility], [address] [place] [address] authority and the [employer] General Act. Patient identification was verified, and a caregiver was present when appropriate. The patient was located in a state where the provider was credentialed to provide care.     Note: not billable if this call serves to triage the patient into an appointment for the relevant concern      Bisi Morrow MD

## 2021-06-10 ENCOUNTER — TELEPHONE (OUTPATIENT)
Dept: ADMINISTRATIVE | Age: 61
End: 2021-06-10

## 2021-06-10 NOTE — TELEPHONE ENCOUNTER
Yoni Fortune requests that nurse return their call. Discussion at last visit about compression socks, he would like a return call to answer some more questions. The best time to reach him is Anytime @ [phone]. Thank you.

## 2021-06-11 NOTE — TELEPHONE ENCOUNTER
Pt was told he would just need to get them OTC. He will check back with [facility] and let me know if he needs anything from me.

## 2021-06-14 NOTE — PROGRESS NOTES
Pharmacy requesting the following meds:    Lisinopril-hydroCHLOROthiazide 10-12.5 MG Oral Tablet  Medication stopped, dosage changed  metFORMIN HCl  MG Oral Tablet Extended Release 24 Hour  Last refill 05/13/21 # 30 with 5    LOV 04/14/21  RTC 3 months      Prescriptions sent to the pharmacy via e-prescribe        Pt in the office to  HST monitor. Pt was educated on how to use equipment properly and instructed to wear for 2 nights and to return on Monday. Pt states he understood.

## 2021-06-21 PROCEDURE — 93248 EXT ECG>7D<15D REV&INTERPJ: CPT | Performed by: INTERNAL MEDICINE

## 2021-06-21 NOTE — PROCEDURES
The predominant rhythm is sinus at 65 to 119 bpm  There were rare PACs and there were 5 episodes of SVT, most of which appear to be accelerated junctional rhythm up to about 100 bpm, the longest that almost 40 minutes, rate 100. There were rare PVCs with one episode of ventricular tachycardia lasting for 11 beats  There are no significant pauses  Patient symptoms are reported with sinus rhythm, the episode of ventricular tachycardia and also with PACs.

## 2021-06-22 ENCOUNTER — TELEPHONE (OUTPATIENT)
Dept: FAMILY MEDICINE CLINIC | Age: 61
End: 2021-06-22

## 2021-06-22 ENCOUNTER — TELEPHONE (OUTPATIENT)
Dept: CARDIOLOGY CLINIC | Age: 61
End: 2021-06-22

## 2021-06-22 NOTE — TELEPHONE ENCOUNTER
Pt is aware. He has an appointment to establish care with Dr. Vivian Moya at [facility] in October. I will forward these results for Dr. Vivian Moya to review and adjust patient's appointment if needed.  Ti COUCH.

## 2021-06-22 NOTE — TELEPHONE ENCOUNTER
Patient has called cardiology office today stating he got results of his ZIO on mychart and doesn't understand them. Advised that he will need to call his PCP office who ordered monitor for further advisement. Patient is not established with cardiology and our office did not order monitor.

## 2021-07-19 ENCOUNTER — OFFICE VISIT (OUTPATIENT)
Dept: FAMILY MEDICINE CLINIC | Age: 61
End: 2021-07-19
Payer: MEDICARE

## 2021-07-19 VITALS
BODY MASS INDEX: 38.69 KG/M2 | OXYGEN SATURATION: 98 % | TEMPERATURE: 98.4 F | WEIGHT: 262 LBS | DIASTOLIC BLOOD PRESSURE: 68 MMHG | RESPIRATION RATE: 16 BRPM | SYSTOLIC BLOOD PRESSURE: 138 MMHG | HEART RATE: 72 BPM

## 2021-07-19 DIAGNOSIS — N40.1 BENIGN PROSTATIC HYPERPLASIA WITH WEAK URINARY STREAM: ICD-10-CM

## 2021-07-19 DIAGNOSIS — R39.12 BENIGN PROSTATIC HYPERPLASIA WITH WEAK URINARY STREAM: ICD-10-CM

## 2021-07-19 DIAGNOSIS — N40.1 BENIGN PROSTATIC HYPERPLASIA WITH WEAK URINARY STREAM: Primary | ICD-10-CM

## 2021-07-19 DIAGNOSIS — R39.12 BENIGN PROSTATIC HYPERPLASIA WITH WEAK URINARY STREAM: Primary | ICD-10-CM

## 2021-07-19 LAB
ALBUMIN SERPL-MCNC: 4.2 G/DL (ref 3.5–5.2)
ALP BLD-CCNC: 86 U/L (ref 40–130)
ALT SERPL-CCNC: 18 U/L (ref 5–41)
ANION GAP SERPL CALCULATED.3IONS-SCNC: 12 MMOL/L (ref 7–19)
AST SERPL-CCNC: 25 U/L (ref 5–40)
BACTERIA: NEGATIVE /HPF
BILIRUB SERPL-MCNC: 1 MG/DL (ref 0.2–1.2)
BILIRUBIN URINE: NEGATIVE
BLOOD, URINE: NEGATIVE
BUN BLDV-MCNC: 13 MG/DL (ref 8–23)
CALCIUM SERPL-MCNC: 9.7 MG/DL (ref 8.8–10.2)
CHLORIDE BLD-SCNC: 97 MMOL/L (ref 98–111)
CLARITY: CLEAR
CO2: 26 MMOL/L (ref 22–29)
COLOR: YELLOW
CREAT SERPL-MCNC: 0.8 MG/DL (ref 0.5–1.2)
CRYSTALS, UA: ABNORMAL /HPF
EPITHELIAL CELLS, UA: 2 /HPF (ref 0–5)
GFR AFRICAN AMERICAN: >59
GFR NON-AFRICAN AMERICAN: >60
GLUCOSE BLD-MCNC: 123 MG/DL (ref 74–109)
GLUCOSE URINE: NEGATIVE MG/DL
HYALINE CASTS: 0 /HPF (ref 0–8)
KETONES, URINE: NEGATIVE MG/DL
LEUKOCYTE ESTERASE, URINE: ABNORMAL
NITRITE, URINE: NEGATIVE
PH UA: 7 (ref 5–8)
POTASSIUM SERPL-SCNC: 4.2 MMOL/L (ref 3.5–5)
PROSTATE SPECIFIC ANTIGEN: 1.08 NG/ML (ref 0–4)
PROTEIN UA: NEGATIVE MG/DL
RBC UA: 1 /HPF (ref 0–4)
SODIUM BLD-SCNC: 135 MMOL/L (ref 136–145)
SPECIFIC GRAVITY UA: 1.01 (ref 1–1.03)
TOTAL PROTEIN: 7.7 G/DL (ref 6.6–8.7)
UROBILINOGEN, URINE: 0.2 E.U./DL
WBC UA: 1 /HPF (ref 0–5)

## 2021-07-19 PROCEDURE — G8427 DOCREV CUR MEDS BY ELIG CLIN: HCPCS | Performed by: FAMILY MEDICINE

## 2021-07-19 PROCEDURE — 1036F TOBACCO NON-USER: CPT | Performed by: FAMILY MEDICINE

## 2021-07-19 PROCEDURE — G8417 CALC BMI ABV UP PARAM F/U: HCPCS | Performed by: FAMILY MEDICINE

## 2021-07-19 PROCEDURE — 99213 OFFICE O/P EST LOW 20 MIN: CPT | Performed by: FAMILY MEDICINE

## 2021-07-19 PROCEDURE — 3017F COLORECTAL CA SCREEN DOC REV: CPT | Performed by: FAMILY MEDICINE

## 2021-07-19 RX ORDER — TAMSULOSIN HYDROCHLORIDE 0.4 MG/1
0.4 CAPSULE ORAL DAILY
Qty: 30 CAPSULE | Refills: 5 | Status: SHIPPED | OUTPATIENT
Start: 2021-07-19

## 2021-07-19 ASSESSMENT — ENCOUNTER SYMPTOMS
EYES NEGATIVE: 1
ALLERGIC/IMMUNOLOGIC NEGATIVE: 1
GASTROINTESTINAL NEGATIVE: 1
RESPIRATORY NEGATIVE: 1

## 2021-07-19 ASSESSMENT — PATIENT HEALTH QUESTIONNAIRE - PHQ9
SUM OF ALL RESPONSES TO PHQ QUESTIONS 1-9: 0
SUM OF ALL RESPONSES TO PHQ QUESTIONS 1-9: 0
1. LITTLE INTEREST OR PLEASURE IN DOING THINGS: 0
2. FEELING DOWN, DEPRESSED OR HOPELESS: 0
SUM OF ALL RESPONSES TO PHQ QUESTIONS 1-9: 0
SUM OF ALL RESPONSES TO PHQ9 QUESTIONS 1 & 2: 0

## 2021-07-19 NOTE — PROGRESS NOTES
puffs into the lungs every 4 hours as needed for Wheezing or Shortness of Breath      Fluticasone-Umeclidin-Vilant (TRELEGY ELLIPTA IN) Inhale 1 puff into the lungs daily      nitroGLYCERIN (NITROSTAT) 0.4 MG SL tablet Place 1 tablet under the tongue every 5 minutes as needed for Chest pain up to max of 3 total doses. If no relief after 1 dose, call 911. 25 tablet 3    linaCLOtide (LINZESS) 72 MCG CAPS capsule Take 1 capsule by mouth every morning (before breakfast) Indications: gets samples 30 capsule 5    diclofenac sodium (VOLTAREN) 1 % GEL Apply 4 g topically 2 times daily (Patient not taking: Reported on 7/19/2021) 150 g 5    betamethasone dipropionate 0.05 % lotion Apply topically 2 times daily. (Patient not taking: Reported on 7/19/2021) 1 Bottle 0     No current facility-administered medications for this visit. Allergies   Allergen Reactions    Bee Venom        Review of Systems   Constitutional: Negative. HENT: Negative. Eyes: Negative. Respiratory: Negative. Cardiovascular: Negative. Gastrointestinal: Negative. Endocrine: Negative. Genitourinary: Positive for difficulty urinating. Musculoskeletal: Negative. Skin: Negative. Allergic/Immunologic: Negative. Neurological: Negative. Hematological: Negative. Psychiatric/Behavioral: Negative. OBJECTIVE:    Physical Exam  Vitals reviewed. Constitutional:       Appearance: Normal appearance. He is well-developed. HENT:      Head: Normocephalic and atraumatic. Right Ear: Tympanic membrane, ear canal and external ear normal. There is no impacted cerumen. Left Ear: Tympanic membrane, ear canal and external ear normal. There is no impacted cerumen. Nose: Nose normal.      Mouth/Throat:      Lips: Pink. Mouth: Mucous membranes are moist.      Dentition: Normal dentition. Tongue: No lesions. Pharynx: Oropharynx is clear. Uvula midline.       Tonsils: No tonsillar exudate or tonsillar abscesses. Eyes:      General: Lids are normal.         Right eye: No discharge. Left eye: No discharge. Extraocular Movements:      Right eye: Normal extraocular motion. Left eye: Normal extraocular motion. Conjunctiva/sclera: Conjunctivae normal.      Right eye: Right conjunctiva is not injected. Left eye: Left conjunctiva is not injected. Pupils: Pupils are equal, round, and reactive to light. Neck:      Thyroid: No thyromegaly. Vascular: No carotid bruit or JVD. Cardiovascular:      Rate and Rhythm: Normal rate and regular rhythm. Pulses:           Carotid pulses are 2+ on the right side and 2+ on the left side. Radial pulses are 2+ on the right side and 2+ on the left side. Heart sounds: Normal heart sounds, S1 normal and S2 normal. No murmur heard. Pulmonary:      Effort: Pulmonary effort is normal. No accessory muscle usage. Breath sounds: Normal breath sounds. Abdominal:      General: Bowel sounds are normal. There is no distension or abdominal bruit. Palpations: Abdomen is soft. There is no mass. Tenderness: There is no abdominal tenderness. Hernia: No hernia is present. Musculoskeletal:         General: Normal range of motion. Cervical back: Normal range of motion and neck supple. Right lower leg: No edema. Left lower leg: No edema. Lymphadenopathy:      Cervical:      Right cervical: No superficial cervical adenopathy. Left cervical: No superficial cervical adenopathy. Skin:     General: Skin is warm and dry. Coloration: Skin is not jaundiced or pale. Findings: No lesion or rash. Nails: There is no clubbing. Neurological:      Mental Status: He is alert and oriented to person, place, and time. Cranial Nerves: No facial asymmetry. Motor: No weakness or tremor.       Coordination: Coordination normal.      Gait: Gait normal.      Deep Tendon Reflexes: Reflexes are normal and symmetric. Psychiatric:         Attention and Perception: Attention normal.         Mood and Affect: Mood normal.         Speech: Speech normal.         Behavior: Behavior normal.         Thought Content: Thought content normal.         Cognition and Memory: Memory normal.         Judgment: Judgment normal.        /68 (Site: Right Upper Arm, Position: Sitting, Cuff Size: Medium Adult)   Pulse 72   Temp 98.4 °F (36.9 °C) (Skin)   Resp 16   Wt 262 lb (118.8 kg)   SpO2 98%   BMI 38.69 kg/m²      ASSESSMENT:     Diagnosis Orders   1. Benign prostatic hyperplasia with weak urinary streamsuspected PSA, Diagnostic    Urinalysis Reflex to Culture    Comprehensive Metabolic Panel    tamsulosin (FLOMAX) 0.4 MG capsule        PLAN:    1. Flomax. Blood work.   Follow-up 1 month

## 2021-07-23 ENCOUNTER — VIRTUAL VISIT (OUTPATIENT)
Dept: FAMILY MEDICINE CLINIC | Age: 61
End: 2021-07-23
Payer: MEDICARE

## 2021-07-23 DIAGNOSIS — J44.9 CHRONIC OBSTRUCTIVE PULMONARY DISEASE, UNSPECIFIED COPD TYPE (HCC): Primary | ICD-10-CM

## 2021-07-23 DIAGNOSIS — J44.9 CHRONIC OBSTRUCTIVE PULMONARY DISEASE, UNSPECIFIED COPD TYPE (HCC): ICD-10-CM

## 2021-07-23 PROCEDURE — 99442 PR PHYS/QHP TELEPHONE EVALUATION 11-20 MIN: CPT | Performed by: FAMILY MEDICINE

## 2021-07-23 RX ORDER — PREDNISONE 20 MG/1
20 TABLET ORAL 2 TIMES DAILY
Qty: 8 TABLET | Refills: 0 | Status: SHIPPED | OUTPATIENT
Start: 2021-07-23 | End: 2021-07-27

## 2021-07-23 RX ORDER — CEFDINIR 300 MG/1
300 CAPSULE ORAL 2 TIMES DAILY
Qty: 20 CAPSULE | Refills: 0 | Status: SHIPPED | OUTPATIENT
Start: 2021-07-23 | End: 2021-08-02

## 2021-07-24 LAB — SARS-COV-2, PCR: DETECTED

## 2021-07-30 ENCOUNTER — TELEPHONE (OUTPATIENT)
Dept: FAMILY MEDICINE CLINIC | Age: 61
End: 2021-07-30

## 2021-07-30 NOTE — TELEPHONE ENCOUNTER
Pt is not having shortness of breath. He is thinking he is having side effects of the infusion or he is a COVID longhauler. He is calling the CDC to talk to them as well. He is wanting to know the variant he had and no one can tell him. He wants you to read an article from Latisha Armenta regarding COVID longhaulers.

## 2021-07-30 NOTE — TELEPHONE ENCOUNTER
Hawthorn Captain requests that Hughes Gareth nurse return their call. The best time to reach him is Anytime. Pt called wanting to know the next steps he needs to take. Pt has COVID and is not feeling any better from the infusion he was told to get. Thank you.

## 2021-08-06 ENCOUNTER — TELEPHONE (OUTPATIENT)
Dept: FAMILY MEDICINE CLINIC | Age: 61
End: 2021-08-06

## 2021-08-06 DIAGNOSIS — G43.909 MIGRAINE WITHOUT STATUS MIGRAINOSUS, NOT INTRACTABLE, UNSPECIFIED MIGRAINE TYPE: Primary | ICD-10-CM

## 2021-08-06 NOTE — TELEPHONE ENCOUNTER
Patient called stating he is still having a lot of shortness of breath and fatigue from covid. His biggest issue is he is having daily headaches, he states \"migraines\" and he has never had headaches before according to him. Please advise.

## 2021-08-06 NOTE — TELEPHONE ENCOUNTER
Patient had spoken to JOEL Delacruz yesterday when she was on call. Patient was advised that he needed to follow up with his PCP to discuss symptoms of migraine headache. Patient was called today to advise him that he could be scheduled for Monday with his PCP. Patient refused to make an appointment and stated, \" I guess I am just going to have to suffer till Monday! \" Patient was advised of the urgent care and walk-in clinics and he refused both and said he was afraid to drive. Patient also refused ambulance since he owes them money and didn't want to go to the ER. Patient refused every suggestion and hung up on me.

## 2021-08-09 RX ORDER — BUTALBITAL, ACETAMINOPHEN AND CAFFEINE 50; 325; 40 MG/1; MG/1; MG/1
1 TABLET ORAL EVERY 6 HOURS PRN
Qty: 15 TABLET | Refills: 0 | Status: SHIPPED | OUTPATIENT
Start: 2021-08-09 | End: 2021-08-24 | Stop reason: SDUPTHER

## 2021-08-09 NOTE — TELEPHONE ENCOUNTER
Pt has an appointment tomorrow. He went to [facility] last night ER and they did do a Brain MRI will request records. Will send a few fioricet for patient to try before his apt tomorrow.

## 2021-08-10 ENCOUNTER — VIRTUAL VISIT (OUTPATIENT)
Dept: FAMILY MEDICINE CLINIC | Age: 61
End: 2021-08-10
Payer: MEDICARE

## 2021-08-10 DIAGNOSIS — R39.12 BENIGN PROSTATIC HYPERPLASIA WITH WEAK URINARY STREAM: ICD-10-CM

## 2021-08-10 DIAGNOSIS — U09.9 LONG COVID: Primary | ICD-10-CM

## 2021-08-10 DIAGNOSIS — N40.1 BENIGN PROSTATIC HYPERPLASIA WITH WEAK URINARY STREAM: ICD-10-CM

## 2021-08-10 PROCEDURE — 99442 PR PHYS/QHP TELEPHONE EVALUATION 11-20 MIN: CPT | Performed by: FAMILY MEDICINE

## 2021-08-10 NOTE — PROGRESS NOTES
Dallin Sexton is a 59 y.o. male evaluated via telephone on 8/10/2021. Consent:  He and/or health care decision maker is aware that that he may receive a bill for this telephone service, depending on his insurance coverage, and has provided verbal consent to proceed: Yes      Documentation:  I communicated with the patient and/or health care decision maker about patient is a 68-year-old white male. He was diagnosed with COVID-19 in July 23. He went to the ER complaining of nausea and headaches. PCR test shows Covid positive again. He was told he have an infection again. I explained to him that more than likely he does have a continuation of a positive test from previous Covid infections in July. More than likely he is having long-term side effects from Covid. At this point his headache and nausea is somewhat better. He was treated with fluids antiemetics and steroids. He also have BPH. He was given Flomax. Flomax seems to be working. He wondered he did take the medication long-term. Details of this discussion including any medical advice provided: COVID-19 test positive at the hospital is more than likely continuation of his previous infection. No further intervention is necessary. He will have his second mother in a shot 3 months from infusion day. There is no treatment for long-term Covid. Fioricet seem to help his headaches. We will continue to use that as needed. At this point he is doing okay. He also will need to be on Flomax long-term. Follow-up 4 weeks to 3 months      I affirm this is a Patient Initiated Episode with a Patient who has not had a related appointment within my department in the past 7 days or scheduled within the next 24 hours.     Patient identification was verified at the start of the visit: Yes    Total Time: minutes: 11-20 minutes    The visit was conducted pursuant to the emergency declaration under the [facility] [address] waiver authority and the [employer] General Act. Patient identification was verified, and a caregiver was present when appropriate. The patient was located in a state where the provider was credentialed to provide care.     Note: not billable if this call serves to triage the patient into an appointment for the relevant concern      Candace Pinto MD

## 2021-08-11 ENCOUNTER — NURSE TRIAGE (OUTPATIENT)
Dept: OTHER | Facility: CLINIC | Age: 61
End: 2021-08-11

## 2021-08-11 ENCOUNTER — APPOINTMENT (OUTPATIENT)
Dept: CT IMAGING | Age: 61
DRG: 177 | End: 2021-08-11
Payer: MEDICARE

## 2021-08-11 ENCOUNTER — APPOINTMENT (OUTPATIENT)
Dept: GENERAL RADIOLOGY | Age: 61
DRG: 177 | End: 2021-08-11
Payer: MEDICARE

## 2021-08-11 ENCOUNTER — HOSPITAL ENCOUNTER (INPATIENT)
Age: 61
LOS: 7 days | Discharge: HOME HEALTH | DRG: 177 | End: 2021-08-18
Attending: EMERGENCY MEDICINE | Admitting: INTERNAL MEDICINE
Payer: MEDICARE

## 2021-08-11 DIAGNOSIS — J96.01 ACUTE RESPIRATORY FAILURE WITH HYPOXIA (HCC): Primary | ICD-10-CM

## 2021-08-11 DIAGNOSIS — U07.1 COVID-19: ICD-10-CM

## 2021-08-11 LAB
ALBUMIN SERPL-MCNC: 3.6 G/DL (ref 3.5–5.2)
ALP BLD-CCNC: 97 U/L (ref 40–130)
ALT SERPL-CCNC: 24 U/L (ref 5–41)
ANION GAP SERPL CALCULATED.3IONS-SCNC: 14 MMOL/L (ref 7–19)
APTT: 32.9 SEC (ref 26–36.2)
AST SERPL-CCNC: 27 U/L (ref 5–40)
BASE EXCESS ARTERIAL: 3.3 MMOL/L (ref -2–2)
BASOPHILS ABSOLUTE: 0 K/UL (ref 0–0.2)
BASOPHILS RELATIVE PERCENT: 0.2 % (ref 0–1)
BILIRUB SERPL-MCNC: 1 MG/DL (ref 0.2–1.2)
BUN BLDV-MCNC: 12 MG/DL (ref 8–23)
C-REACTIVE PROTEIN: 10.12 MG/DL (ref 0–0.5)
CALCIUM SERPL-MCNC: 9.4 MG/DL (ref 8.8–10.2)
CARBOXYHEMOGLOBIN ARTERIAL: 2.2 % (ref 0–5)
CHLORIDE BLD-SCNC: 101 MMOL/L (ref 98–111)
CO2: 24 MMOL/L (ref 22–29)
CREAT SERPL-MCNC: 0.7 MG/DL (ref 0.5–1.2)
D DIMER: 2.14 UG/ML FEU (ref 0–0.48)
EOSINOPHILS ABSOLUTE: 0 K/UL (ref 0–0.6)
EOSINOPHILS RELATIVE PERCENT: 0 % (ref 0–5)
FIBRINOGEN: 707 MG/DL (ref 238–505)
GFR AFRICAN AMERICAN: >59
GFR NON-AFRICAN AMERICAN: >60
GLUCOSE BLD-MCNC: 116 MG/DL (ref 74–109)
HCO3 ARTERIAL: 26.8 MMOL/L (ref 22–26)
HCT VFR BLD CALC: 38.8 % (ref 42–52)
HEMOGLOBIN, ART, EXTENDED: 11.6 G/DL (ref 14–18)
HEMOGLOBIN: 12.6 G/DL (ref 14–18)
IMMATURE GRANULOCYTES #: 0.1 K/UL
INR BLD: 1.31 (ref 0.88–1.18)
LACTIC ACID: 2.4 MMOL/L (ref 0.5–1.9)
LIPASE: 95 U/L (ref 13–60)
LYMPHOCYTES ABSOLUTE: 0.3 K/UL (ref 1.1–4.5)
LYMPHOCYTES RELATIVE PERCENT: 4.8 % (ref 20–40)
MCH RBC QN AUTO: 28.5 PG (ref 27–31)
MCHC RBC AUTO-ENTMCNC: 32.5 G/DL (ref 33–37)
MCV RBC AUTO: 87.8 FL (ref 80–94)
METHEMOGLOBIN ARTERIAL: 0.9 %
MONOCYTES ABSOLUTE: 0.7 K/UL (ref 0–0.9)
MONOCYTES RELATIVE PERCENT: 12.2 % (ref 0–10)
NEUTROPHILS ABSOLUTE: 4.5 K/UL (ref 1.5–7.5)
NEUTROPHILS RELATIVE PERCENT: 81.5 % (ref 50–65)
O2 CONTENT ARTERIAL: 14.9 ML/DL
O2 SAT, ARTERIAL: 91.3 %
O2 THERAPY: ABNORMAL
PCO2 ARTERIAL: 36 MMHG (ref 35–45)
PDW BLD-RTO: 13.8 % (ref 11.5–14.5)
PH ARTERIAL: 7.48 (ref 7.35–7.45)
PLATELET # BLD: 141 K/UL (ref 130–400)
PMV BLD AUTO: 10.8 FL (ref 9.4–12.4)
PO2 ARTERIAL: 56 MMHG (ref 80–100)
POTASSIUM SERPL-SCNC: 3.7 MMOL/L (ref 3.5–5)
POTASSIUM, WHOLE BLOOD: 3.5
PRO-BNP: 817 PG/ML (ref 0–900)
PROTHROMBIN TIME: 16.4 SEC (ref 12–14.6)
RBC # BLD: 4.42 M/UL (ref 4.7–6.1)
SARS-COV-2, NAAT: DETECTED
SODIUM BLD-SCNC: 139 MMOL/L (ref 136–145)
TOTAL PROTEIN: 7.9 G/DL (ref 6.6–8.7)
TROPONIN: <0.01 NG/ML (ref 0–0.03)
WBC # BLD: 5.5 K/UL (ref 4.8–10.8)

## 2021-08-11 PROCEDURE — 82803 BLOOD GASES ANY COMBINATION: CPT

## 2021-08-11 PROCEDURE — 36600 WITHDRAWAL OF ARTERIAL BLOOD: CPT

## 2021-08-11 PROCEDURE — 84132 ASSAY OF SERUM POTASSIUM: CPT

## 2021-08-11 PROCEDURE — 71275 CT ANGIOGRAPHY CHEST: CPT

## 2021-08-11 PROCEDURE — 6370000000 HC RX 637 (ALT 250 FOR IP): Performed by: EMERGENCY MEDICINE

## 2021-08-11 PROCEDURE — 96375 TX/PRO/DX INJ NEW DRUG ADDON: CPT

## 2021-08-11 PROCEDURE — 74177 CT ABD & PELVIS W/CONTRAST: CPT

## 2021-08-11 PROCEDURE — 86140 C-REACTIVE PROTEIN: CPT

## 2021-08-11 PROCEDURE — 2100000000 HC CCU R&B

## 2021-08-11 PROCEDURE — 6360000002 HC RX W HCPCS: Performed by: EMERGENCY MEDICINE

## 2021-08-11 PROCEDURE — 83605 ASSAY OF LACTIC ACID: CPT

## 2021-08-11 PROCEDURE — 83690 ASSAY OF LIPASE: CPT

## 2021-08-11 PROCEDURE — 2580000003 HC RX 258: Performed by: HOSPITALIST

## 2021-08-11 PROCEDURE — 6360000002 HC RX W HCPCS: Performed by: HOSPITALIST

## 2021-08-11 PROCEDURE — 2700000000 HC OXYGEN THERAPY PER DAY

## 2021-08-11 PROCEDURE — 99283 EMERGENCY DEPT VISIT LOW MDM: CPT

## 2021-08-11 PROCEDURE — 85610 PROTHROMBIN TIME: CPT

## 2021-08-11 PROCEDURE — 85384 FIBRINOGEN ACTIVITY: CPT

## 2021-08-11 PROCEDURE — 36415 COLL VENOUS BLD VENIPUNCTURE: CPT

## 2021-08-11 PROCEDURE — 84484 ASSAY OF TROPONIN QUANT: CPT

## 2021-08-11 PROCEDURE — 71045 X-RAY EXAM CHEST 1 VIEW: CPT

## 2021-08-11 PROCEDURE — 80053 COMPREHEN METABOLIC PANEL: CPT

## 2021-08-11 PROCEDURE — 85730 THROMBOPLASTIN TIME PARTIAL: CPT

## 2021-08-11 PROCEDURE — 85379 FIBRIN DEGRADATION QUANT: CPT

## 2021-08-11 PROCEDURE — 6360000002 HC RX W HCPCS

## 2021-08-11 PROCEDURE — 87040 BLOOD CULTURE FOR BACTERIA: CPT

## 2021-08-11 PROCEDURE — 6370000000 HC RX 637 (ALT 250 FOR IP): Performed by: HOSPITALIST

## 2021-08-11 PROCEDURE — 93005 ELECTROCARDIOGRAM TRACING: CPT | Performed by: EMERGENCY MEDICINE

## 2021-08-11 PROCEDURE — 94660 CPAP INITIATION&MGMT: CPT

## 2021-08-11 PROCEDURE — 83880 ASSAY OF NATRIURETIC PEPTIDE: CPT

## 2021-08-11 PROCEDURE — 87635 SARS-COV-2 COVID-19 AMP PRB: CPT

## 2021-08-11 PROCEDURE — 96374 THER/PROPH/DIAG INJ IV PUSH: CPT

## 2021-08-11 PROCEDURE — 85025 COMPLETE CBC W/AUTO DIFF WBC: CPT

## 2021-08-11 RX ORDER — GUAIFENESIN/DEXTROMETHORPHAN 100-10MG/5
5 SYRUP ORAL EVERY 4 HOURS PRN
Status: DISCONTINUED | OUTPATIENT
Start: 2021-08-11 | End: 2021-08-18 | Stop reason: HOSPADM

## 2021-08-11 RX ORDER — ALBUTEROL SULFATE 2.5 MG/3ML
2.5 SOLUTION RESPIRATORY (INHALATION)
Status: DISCONTINUED | OUTPATIENT
Start: 2021-08-11 | End: 2021-08-11

## 2021-08-11 RX ORDER — SODIUM CHLORIDE 0.9 % (FLUSH) 0.9 %
5-40 SYRINGE (ML) INJECTION EVERY 12 HOURS SCHEDULED
Status: DISCONTINUED | OUTPATIENT
Start: 2021-08-11 | End: 2021-08-18 | Stop reason: HOSPADM

## 2021-08-11 RX ORDER — MECOBALAMIN 5000 MCG
5 TABLET,DISINTEGRATING ORAL NIGHTLY PRN
Status: DISCONTINUED | OUTPATIENT
Start: 2021-08-11 | End: 2021-08-18 | Stop reason: HOSPADM

## 2021-08-11 RX ORDER — ONDANSETRON 4 MG/1
4 TABLET, ORALLY DISINTEGRATING ORAL EVERY 8 HOURS PRN
Status: CANCELLED | OUTPATIENT
Start: 2021-08-11

## 2021-08-11 RX ORDER — ACETAMINOPHEN 325 MG/1
650 TABLET ORAL EVERY 6 HOURS PRN
Status: DISCONTINUED | OUTPATIENT
Start: 2021-08-11 | End: 2021-08-18 | Stop reason: HOSPADM

## 2021-08-11 RX ORDER — FUROSEMIDE 40 MG/1
20 TABLET ORAL DAILY
Status: DISCONTINUED | OUTPATIENT
Start: 2021-08-11 | End: 2021-08-12

## 2021-08-11 RX ORDER — ACETAMINOPHEN 650 MG/1
650 SUPPOSITORY RECTAL EVERY 6 HOURS PRN
Status: DISCONTINUED | OUTPATIENT
Start: 2021-08-11 | End: 2021-08-18 | Stop reason: HOSPADM

## 2021-08-11 RX ORDER — PROMETHAZINE HYDROCHLORIDE 25 MG/ML
INJECTION, SOLUTION INTRAMUSCULAR; INTRAVENOUS
Status: COMPLETED
Start: 2021-08-11 | End: 2021-08-11

## 2021-08-11 RX ORDER — POLYETHYLENE GLYCOL 3350 17 G/17G
17 POWDER, FOR SOLUTION ORAL DAILY PRN
Status: DISCONTINUED | OUTPATIENT
Start: 2021-08-11 | End: 2021-08-18 | Stop reason: HOSPADM

## 2021-08-11 RX ORDER — BUDESONIDE 0.5 MG/2ML
0.5 INHALANT ORAL EVERY 12 HOURS
Status: DISCONTINUED | OUTPATIENT
Start: 2021-08-11 | End: 2021-08-11

## 2021-08-11 RX ORDER — ALBUTEROL SULFATE 90 UG/1
2 AEROSOL, METERED RESPIRATORY (INHALATION) 4 TIMES DAILY
Status: DISCONTINUED | OUTPATIENT
Start: 2021-08-11 | End: 2021-08-11

## 2021-08-11 RX ORDER — CALCIUM CARBONATE 200(500)MG
500 TABLET,CHEWABLE ORAL 3 TIMES DAILY PRN
Status: DISCONTINUED | OUTPATIENT
Start: 2021-08-11 | End: 2021-08-18 | Stop reason: HOSPADM

## 2021-08-11 RX ORDER — FOLIC ACID 1 MG/1
1 TABLET ORAL DAILY
Status: DISCONTINUED | OUTPATIENT
Start: 2021-08-11 | End: 2021-08-15 | Stop reason: SDUPTHER

## 2021-08-11 RX ORDER — PROMETHAZINE HYDROCHLORIDE 25 MG/ML
12.5 INJECTION, SOLUTION INTRAMUSCULAR; INTRAVENOUS ONCE
Status: COMPLETED | OUTPATIENT
Start: 2021-08-11 | End: 2021-08-11

## 2021-08-11 RX ORDER — ISOSORBIDE MONONITRATE 30 MG/1
30 TABLET, EXTENDED RELEASE ORAL DAILY
Status: DISCONTINUED | OUTPATIENT
Start: 2021-08-11 | End: 2021-08-12

## 2021-08-11 RX ORDER — NITROGLYCERIN 0.4 MG/1
0.4 TABLET SUBLINGUAL EVERY 5 MIN PRN
Status: DISCONTINUED | OUTPATIENT
Start: 2021-08-11 | End: 2021-08-18 | Stop reason: HOSPADM

## 2021-08-11 RX ORDER — SOTALOL HYDROCHLORIDE 80 MG/1
80 TABLET ORAL 2 TIMES DAILY
Status: DISCONTINUED | OUTPATIENT
Start: 2021-08-11 | End: 2021-08-18 | Stop reason: HOSPADM

## 2021-08-11 RX ORDER — PREDNISONE 20 MG/1
20 TABLET ORAL DAILY
Status: ON HOLD | COMMUNITY
End: 2021-08-18 | Stop reason: HOSPADM

## 2021-08-11 RX ORDER — ONDANSETRON 2 MG/ML
4 INJECTION INTRAMUSCULAR; INTRAVENOUS ONCE
Status: COMPLETED | OUTPATIENT
Start: 2021-08-11 | End: 2021-08-11

## 2021-08-11 RX ORDER — ALBUTEROL SULFATE 90 UG/1
2 AEROSOL, METERED RESPIRATORY (INHALATION) EVERY 4 HOURS PRN
Status: DISCONTINUED | OUTPATIENT
Start: 2021-08-11 | End: 2021-08-18 | Stop reason: HOSPADM

## 2021-08-11 RX ORDER — TIZANIDINE 4 MG/1
4 TABLET ORAL EVERY 8 HOURS PRN
Status: DISCONTINUED | OUTPATIENT
Start: 2021-08-11 | End: 2021-08-18 | Stop reason: HOSPADM

## 2021-08-11 RX ORDER — ONDANSETRON 2 MG/ML
4 INJECTION INTRAMUSCULAR; INTRAVENOUS EVERY 6 HOURS PRN
Status: CANCELLED | OUTPATIENT
Start: 2021-08-11

## 2021-08-11 RX ORDER — SPIRONOLACTONE 50 MG/1
50 TABLET, FILM COATED ORAL DAILY
Status: DISCONTINUED | OUTPATIENT
Start: 2021-08-11 | End: 2021-08-18 | Stop reason: HOSPADM

## 2021-08-11 RX ORDER — PROMETHAZINE HYDROCHLORIDE 25 MG/ML
6.25 INJECTION, SOLUTION INTRAMUSCULAR; INTRAVENOUS EVERY 6 HOURS PRN
Status: DISCONTINUED | OUTPATIENT
Start: 2021-08-11 | End: 2021-08-18 | Stop reason: HOSPADM

## 2021-08-11 RX ORDER — ARFORMOTEROL TARTRATE 15 UG/2ML
15 SOLUTION RESPIRATORY (INHALATION) 2 TIMES DAILY
Status: DISCONTINUED | OUTPATIENT
Start: 2021-08-11 | End: 2021-08-11

## 2021-08-11 RX ORDER — SODIUM CHLORIDE 9 MG/ML
25 INJECTION, SOLUTION INTRAVENOUS PRN
Status: DISCONTINUED | OUTPATIENT
Start: 2021-08-11 | End: 2021-08-18 | Stop reason: HOSPADM

## 2021-08-11 RX ORDER — VITAMIN B COMPLEX
2000 TABLET ORAL DAILY
Status: DISCONTINUED | OUTPATIENT
Start: 2021-08-12 | End: 2021-08-18 | Stop reason: HOSPADM

## 2021-08-11 RX ORDER — CELECOXIB 200 MG/1
200 CAPSULE ORAL DAILY
Status: DISCONTINUED | OUTPATIENT
Start: 2021-08-11 | End: 2021-08-12

## 2021-08-11 RX ORDER — TAMSULOSIN HYDROCHLORIDE 0.4 MG/1
0.4 CAPSULE ORAL DAILY
Status: DISCONTINUED | OUTPATIENT
Start: 2021-08-11 | End: 2021-08-18 | Stop reason: HOSPADM

## 2021-08-11 RX ORDER — SODIUM CHLORIDE 0.9 % (FLUSH) 0.9 %
5-40 SYRINGE (ML) INJECTION PRN
Status: DISCONTINUED | OUTPATIENT
Start: 2021-08-11 | End: 2021-08-18 | Stop reason: HOSPADM

## 2021-08-11 RX ORDER — PANTOPRAZOLE SODIUM 40 MG/1
40 TABLET, DELAYED RELEASE ORAL
Status: DISCONTINUED | OUTPATIENT
Start: 2021-08-12 | End: 2021-08-18 | Stop reason: HOSPADM

## 2021-08-11 RX ADMIN — SOTALOL HYDROCHLORIDE 80 MG: 80 TABLET ORAL at 21:11

## 2021-08-11 RX ADMIN — ACETAMINOPHEN 650 MG: 325 TABLET ORAL at 21:10

## 2021-08-11 RX ADMIN — CELECOXIB 200 MG: 200 CAPSULE ORAL at 22:56

## 2021-08-11 RX ADMIN — ISOSORBIDE MONONITRATE 30 MG: 30 TABLET, EXTENDED RELEASE ORAL at 22:56

## 2021-08-11 RX ADMIN — PROMETHAZINE HYDROCHLORIDE 6.25 MG: 25 INJECTION INTRAMUSCULAR; INTRAVENOUS at 21:17

## 2021-08-11 RX ADMIN — Medication 5 MG: at 21:10

## 2021-08-11 RX ADMIN — ALBUTEROL SULFATE 2 PUFF: 90 AEROSOL, METERED RESPIRATORY (INHALATION) at 19:16

## 2021-08-11 RX ADMIN — FOLIC ACID 1 MG: 1 TABLET ORAL at 21:18

## 2021-08-11 RX ADMIN — SODIUM CHLORIDE, PRESERVATIVE FREE 10 ML: 5 INJECTION INTRAVENOUS at 22:21

## 2021-08-11 RX ADMIN — VERAPAMIL HYDROCHLORIDE 120 MG: 120 TABLET, FILM COATED, EXTENDED RELEASE ORAL at 22:56

## 2021-08-11 RX ADMIN — PROMETHAZINE HYDROCHLORIDE 12.5 MG: 25 INJECTION INTRAMUSCULAR; INTRAVENOUS at 19:57

## 2021-08-11 RX ADMIN — ENOXAPARIN SODIUM 30 MG: 30 INJECTION SUBCUTANEOUS at 22:13

## 2021-08-11 RX ADMIN — DEXAMETHASONE 6 MG: 4 TABLET ORAL at 21:18

## 2021-08-11 RX ADMIN — ONDANSETRON HYDROCHLORIDE 4 MG: 2 SOLUTION INTRAMUSCULAR; INTRAVENOUS at 17:30

## 2021-08-11 RX ADMIN — TAMSULOSIN HYDROCHLORIDE 0.4 MG: 0.4 CAPSULE ORAL at 22:13

## 2021-08-11 RX ADMIN — IPRATROPIUM BROMIDE 2 PUFF: 17 AEROSOL, METERED RESPIRATORY (INHALATION) at 19:16

## 2021-08-11 RX ADMIN — FUROSEMIDE 20 MG: 40 TABLET ORAL at 21:18

## 2021-08-11 RX ADMIN — TIZANIDINE 4 MG: 4 TABLET ORAL at 21:10

## 2021-08-11 ASSESSMENT — PAIN SCALES - GENERAL
PAINLEVEL_OUTOF10: 4
PAINLEVEL_OUTOF10: 4

## 2021-08-11 ASSESSMENT — ENCOUNTER SYMPTOMS
VOMITING: 1
RHINORRHEA: 0
NAUSEA: 1
BACK PAIN: 0
WHEEZING: 1
ABDOMINAL PAIN: 1
ABDOMINAL DISTENTION: 1
SORE THROAT: 0
SHORTNESS OF BREATH: 1
COUGH: 1
DIARRHEA: 0
DIARRHEA: 1

## 2021-08-11 NOTE — PROGRESS NOTES
Incoming Lab Results From [facility]    Component Value Ref Range & Units Status Collected Lab   pH, Arterial 7.480High   7.350 - 7.450 Final 08/11/2021  5:27 PM [address] [place] [address] Lab   pCO2, Arterial 36.0  35.0 - 45.0 mmHg Final 08/11/2021  5:27 PM [address] [facility] Lab   pO2, Arterial 56. 0Low   80.0 - 100.0 mmHg Final 08/11/2021  5:27 PM [facility] Lab   HCO3, Arterial 26. 8High   22.0 - 26.0 mmol/L Final 08/11/2021  5:27 PM [facility] Excess, Arterial 3. 3High   -2.0 - 2.0 mmol/L Final 08/11/2021  5:27 PM [address] [place] [address] Lab   Hemoglobin, Art, Extended 11.6Low   14.0 - 18.0 g/dL Final 08/11/2021  5:27 PM [address] Sat, Arterial 91.3  >92 % Final 08/11/2021  5:27 PM [address] [place] [address] Lab   Carboxyhgb, Arterial 2.2  0.0 - 5.0 % Final 08/11/2021  5:27 PM  - [address]   0.0-1.5   (Smokers 1.5-5.0)    Methemoglobin, Arterial 0.9  <1.5 % Final 08/11/2021  5:27 PM [address] [place] [address] Lab   O2 Content, Arterial 14.9  Not Established mL/dL Final 08/11/2021  5:27 PM [address] [place] [address] Lab   O2 Therapy Unknown   Final 08/11/2021  5:27 PM [address] [place] [address] Lab   Testing Performed By    Riki Coelho Name Director Address Valid Date Range   [facility] [address] LAB Evon Chan  [address] Coelho,[address]   [address] [facility] [place] [address] 08/30/17 0733-Present   Lab and Collection    Blood Gas, Arterial - 8/11/2021  Result History    Blood Gas, Arterial on 8/11/2021   Result Information    Flag: AbnormalAbnormal   Status: Final result (Collected: 8/11/2021 17:27) Provider Status: Ordered   Click to Print Result   View [employer] Info    Blood Gas, Arterial (Order #[id number]) on 8/11/21   Order Report    Order Details  dilia lucas
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
## 2021-08-11 NOTE — TELEPHONE ENCOUNTER
Reason for Disposition   MODERATE difficulty breathing (e.g., speaks in phrases, SOB even at rest, pulse 100-120) of new onset or worse than normal    Answer Assessment - Initial Assessment Questions  1. RESPIRATORY STATUS: \"Describe your breathing? \" (e.g., wheezing, shortness of breath, unable to speak, severe coughing)       Very SOB, difficulty speaking    2. ONSET: \"When did this breathing problem begin? \"       Was seen in the ER 3 days ago at [facility]    3. PATTERN \"Does the difficult breathing come and go, or has it been constant since it started? \"       Constant    4. SEVERITY: \"How bad is your breathing? \" (e.g., mild, moderate, severe)     - MILD: No SOB at rest, mild SOB with walking, speaks normally in sentences, can lay down, no retractions, pulse < 100.     - MODERATE: SOB at rest, SOB with minimal exertion and prefers to sit, cannot lie down flat, speaks in phrases, mild retractions, audible wheezing, pulse 100-120.     - SEVERE: Very SOB at rest, speaks in single words, struggling to breathe, sitting hunched forward, retractions, pulse > 120       Moderate    5. RECURRENT SYMPTOM: \"Have you had difficulty breathing before? \" If so, ask: \"When was the last time? \" and \"What happened that time? \"       Has COPD    6. CARDIAC HISTORY: \"Do you have any history of heart disease? \" (e.g., heart attack, angina, bypass surgery, angioplasty)         7. LUNG HISTORY: \"Do you have any history of lung disease? \"  (e.g., pulmonary embolus, asthma, emphysema)      COPD    8. CAUSE: \"What do you think is causing the breathing problem? \"       Is Covid +, struggling to breathe    9. OTHER SYMPTOMS: \"Do you have any other symptoms? (e.g., dizziness, runny nose, cough, chest pain, fever)      Severe, widespread pain    10. PREGNANCY: \"Is there any chance you are pregnant? \" \"When was your last menstrual period? \"        NA    11. TRAVEL: \"Have you traveled out of the country in the last month? \" (e.g., travel history,

## 2021-08-12 PROBLEM — Z51.5 PALLIATIVE CARE PATIENT: Status: ACTIVE | Noted: 2021-08-12

## 2021-08-12 LAB
ANION GAP SERPL CALCULATED.3IONS-SCNC: 10 MMOL/L (ref 7–19)
BASOPHILS ABSOLUTE: 0 K/UL (ref 0–0.2)
BASOPHILS RELATIVE PERCENT: 0 % (ref 0–1)
BILIRUBIN URINE: NEGATIVE
BLOOD, URINE: NEGATIVE
BUN BLDV-MCNC: 17 MG/DL (ref 8–23)
CALCIUM SERPL-MCNC: 8.2 MG/DL (ref 8.8–10.2)
CHLORIDE BLD-SCNC: 98 MMOL/L (ref 98–111)
CLARITY: CLEAR
CO2: 26 MMOL/L (ref 22–29)
COLOR: YELLOW
CREAT SERPL-MCNC: 1.2 MG/DL (ref 0.5–1.2)
EOSINOPHILS ABSOLUTE: 0 K/UL (ref 0–0.6)
EOSINOPHILS RELATIVE PERCENT: 0 % (ref 0–5)
GFR AFRICAN AMERICAN: >59
GFR NON-AFRICAN AMERICAN: >60
GLUCOSE BLD-MCNC: 248 MG/DL (ref 70–99)
GLUCOSE BLD-MCNC: 266 MG/DL (ref 70–99)
GLUCOSE BLD-MCNC: 303 MG/DL (ref 70–99)
GLUCOSE BLD-MCNC: 435 MG/DL (ref 70–99)
GLUCOSE BLD-MCNC: 475 MG/DL (ref 74–109)
GLUCOSE URINE: NEGATIVE MG/DL
HCT VFR BLD CALC: 31.3 % (ref 42–52)
HEMOGLOBIN: 9.9 G/DL (ref 14–18)
IMMATURE GRANULOCYTES #: 0 K/UL
KETONES, URINE: NEGATIVE MG/DL
LEUKOCYTE ESTERASE, URINE: NEGATIVE
LYMPHOCYTES ABSOLUTE: 0.2 K/UL (ref 1.1–4.5)
LYMPHOCYTES RELATIVE PERCENT: 12 % (ref 20–40)
MCH RBC QN AUTO: 28.7 PG (ref 27–31)
MCHC RBC AUTO-ENTMCNC: 31.6 G/DL (ref 33–37)
MCV RBC AUTO: 90.7 FL (ref 80–94)
MONOCYTES ABSOLUTE: 0 K/UL (ref 0–0.9)
MONOCYTES RELATIVE PERCENT: 0 % (ref 0–10)
NEUTROPHILS ABSOLUTE: 1.4 K/UL (ref 1.5–7.5)
NEUTROPHILS RELATIVE PERCENT: 88 % (ref 50–65)
NITRITE, URINE: NEGATIVE
PDW BLD-RTO: 14 % (ref 11.5–14.5)
PERFORMED ON: ABNORMAL
PH UA: 6.5 (ref 5–8)
PLATELET # BLD: 96 K/UL (ref 130–400)
PLATELET SLIDE REVIEW: ABNORMAL
PMV BLD AUTO: 12.2 FL (ref 9.4–12.4)
POTASSIUM REFLEX MAGNESIUM: 4.7 MMOL/L (ref 3.5–5)
PROTEIN UA: NEGATIVE MG/DL
RBC # BLD: 3.45 M/UL (ref 4.7–6.1)
SODIUM BLD-SCNC: 134 MMOL/L (ref 136–145)
SPECIFIC GRAVITY UA: 1.04 (ref 1–1.03)
UROBILINOGEN, URINE: 1 E.U./DL
WBC # BLD: 1.6 K/UL (ref 4.8–10.8)

## 2021-08-12 PROCEDURE — 6360000002 HC RX W HCPCS: Performed by: HOSPITALIST

## 2021-08-12 PROCEDURE — 85025 COMPLETE CBC W/AUTO DIFF WBC: CPT

## 2021-08-12 PROCEDURE — 2500000003 HC RX 250 WO HCPCS: Performed by: INTERNAL MEDICINE

## 2021-08-12 PROCEDURE — XW033H5 INTRODUCTION OF TOCILIZUMAB INTO PERIPHERAL VEIN, PERCUTANEOUS APPROACH, NEW TECHNOLOGY GROUP 5: ICD-10-PCS | Performed by: HOSPITALIST

## 2021-08-12 PROCEDURE — 94660 CPAP INITIATION&MGMT: CPT

## 2021-08-12 PROCEDURE — 6370000000 HC RX 637 (ALT 250 FOR IP): Performed by: INTERNAL MEDICINE

## 2021-08-12 PROCEDURE — 6370000000 HC RX 637 (ALT 250 FOR IP): Performed by: HOSPITALIST

## 2021-08-12 PROCEDURE — 82947 ASSAY GLUCOSE BLOOD QUANT: CPT

## 2021-08-12 PROCEDURE — 81003 URINALYSIS AUTO W/O SCOPE: CPT

## 2021-08-12 PROCEDURE — 80048 BASIC METABOLIC PNL TOTAL CA: CPT

## 2021-08-12 PROCEDURE — XW033E5 INTRODUCTION OF REMDESIVIR ANTI-INFECTIVE INTO PERIPHERAL VEIN, PERCUTANEOUS APPROACH, NEW TECHNOLOGY GROUP 5: ICD-10-PCS | Performed by: INTERNAL MEDICINE

## 2021-08-12 PROCEDURE — 2100000000 HC CCU R&B

## 2021-08-12 PROCEDURE — 2700000000 HC OXYGEN THERAPY PER DAY

## 2021-08-12 PROCEDURE — 2580000003 HC RX 258: Performed by: INTERNAL MEDICINE

## 2021-08-12 PROCEDURE — 2580000003 HC RX 258: Performed by: HOSPITALIST

## 2021-08-12 RX ORDER — ISOSORBIDE MONONITRATE 30 MG/1
30 TABLET, EXTENDED RELEASE ORAL ONCE
Status: COMPLETED | OUTPATIENT
Start: 2021-08-12 | End: 2021-08-12

## 2021-08-12 RX ORDER — INSULIN GLARGINE 100 [IU]/ML
20 INJECTION, SOLUTION SUBCUTANEOUS NIGHTLY
Status: DISCONTINUED | OUTPATIENT
Start: 2021-08-12 | End: 2021-08-13

## 2021-08-12 RX ORDER — DEXTROSE MONOHYDRATE 25 G/50ML
12.5 INJECTION, SOLUTION INTRAVENOUS PRN
Status: DISCONTINUED | OUTPATIENT
Start: 2021-08-12 | End: 2021-08-18 | Stop reason: HOSPADM

## 2021-08-12 RX ORDER — NICOTINE POLACRILEX 4 MG
15 LOZENGE BUCCAL PRN
Status: DISCONTINUED | OUTPATIENT
Start: 2021-08-12 | End: 2021-08-18 | Stop reason: HOSPADM

## 2021-08-12 RX ORDER — ISOSORBIDE MONONITRATE 60 MG/1
60 TABLET, EXTENDED RELEASE ORAL DAILY
Status: DISCONTINUED | OUTPATIENT
Start: 2021-08-13 | End: 2021-08-18 | Stop reason: HOSPADM

## 2021-08-12 RX ORDER — DEXTROSE MONOHYDRATE 50 MG/ML
100 INJECTION, SOLUTION INTRAVENOUS PRN
Status: DISCONTINUED | OUTPATIENT
Start: 2021-08-12 | End: 2021-08-18 | Stop reason: HOSPADM

## 2021-08-12 RX ORDER — 0.9 % SODIUM CHLORIDE 0.9 %
30 INTRAVENOUS SOLUTION INTRAVENOUS PRN
Status: DISCONTINUED | OUTPATIENT
Start: 2021-08-12 | End: 2021-08-18 | Stop reason: HOSPADM

## 2021-08-12 RX ORDER — HYDRALAZINE HYDROCHLORIDE 25 MG/1
25 TABLET, FILM COATED ORAL EVERY 8 HOURS SCHEDULED
Status: DISCONTINUED | OUTPATIENT
Start: 2021-08-12 | End: 2021-08-15

## 2021-08-12 RX ADMIN — VERAPAMIL HYDROCHLORIDE 120 MG: 120 TABLET, FILM COATED, EXTENDED RELEASE ORAL at 20:13

## 2021-08-12 RX ADMIN — SODIUM CHLORIDE, PRESERVATIVE FREE 10 ML: 5 INJECTION INTRAVENOUS at 08:00

## 2021-08-12 RX ADMIN — SODIUM CHLORIDE, PRESERVATIVE FREE 10 ML: 5 INJECTION INTRAVENOUS at 20:14

## 2021-08-12 RX ADMIN — INSULIN LISPRO 5 UNITS: 100 INJECTION, SOLUTION INTRAVENOUS; SUBCUTANEOUS at 16:00

## 2021-08-12 RX ADMIN — CELECOXIB 200 MG: 200 CAPSULE ORAL at 07:32

## 2021-08-12 RX ADMIN — REMDESIVIR 200 MG: 100 INJECTION, POWDER, LYOPHILIZED, FOR SOLUTION INTRAVENOUS at 10:23

## 2021-08-12 RX ADMIN — SOTALOL HYDROCHLORIDE 80 MG: 80 TABLET ORAL at 20:14

## 2021-08-12 RX ADMIN — Medication 2000 UNITS: at 07:31

## 2021-08-12 RX ADMIN — DEXAMETHASONE 6 MG: 4 TABLET ORAL at 17:41

## 2021-08-12 RX ADMIN — ENOXAPARIN SODIUM 30 MG: 30 INJECTION SUBCUTANEOUS at 08:30

## 2021-08-12 RX ADMIN — ISOSORBIDE MONONITRATE 30 MG: 30 TABLET, EXTENDED RELEASE ORAL at 07:32

## 2021-08-12 RX ADMIN — TAMSULOSIN HYDROCHLORIDE 0.4 MG: 0.4 CAPSULE ORAL at 07:32

## 2021-08-12 RX ADMIN — TOCILIZUMAB 810 MG: 180 INJECTION, SOLUTION SUBCUTANEOUS at 01:25

## 2021-08-12 RX ADMIN — SOTALOL HYDROCHLORIDE 80 MG: 80 TABLET ORAL at 08:00

## 2021-08-12 RX ADMIN — ISOSORBIDE MONONITRATE 30 MG: 30 TABLET, EXTENDED RELEASE ORAL at 17:41

## 2021-08-12 RX ADMIN — PROMETHAZINE HYDROCHLORIDE 6.25 MG: 25 INJECTION INTRAMUSCULAR; INTRAVENOUS at 03:30

## 2021-08-12 RX ADMIN — PROMETHAZINE HYDROCHLORIDE 6.25 MG: 25 INJECTION INTRAMUSCULAR; INTRAVENOUS at 23:38

## 2021-08-12 RX ADMIN — FUROSEMIDE 20 MG: 40 TABLET ORAL at 07:32

## 2021-08-12 RX ADMIN — LINACLOTIDE 145 MCG: 145 CAPSULE, GELATIN COATED ORAL at 05:01

## 2021-08-12 RX ADMIN — INSULIN LISPRO 5 UNITS: 100 INJECTION, SOLUTION INTRAVENOUS; SUBCUTANEOUS at 09:00

## 2021-08-12 RX ADMIN — Medication 5 MG: at 23:33

## 2021-08-12 RX ADMIN — INSULIN GLARGINE 20 UNITS: 100 INJECTION, SOLUTION SUBCUTANEOUS at 20:15

## 2021-08-12 RX ADMIN — INSULIN LISPRO 5 UNITS: 100 INJECTION, SOLUTION INTRAVENOUS; SUBCUTANEOUS at 12:30

## 2021-08-12 RX ADMIN — SPIRONOLACTONE 50 MG: 50 TABLET ORAL at 07:32

## 2021-08-12 RX ADMIN — ENOXAPARIN SODIUM 30 MG: 30 INJECTION SUBCUTANEOUS at 20:14

## 2021-08-12 RX ADMIN — FOLIC ACID 1 MG: 1 TABLET ORAL at 07:32

## 2021-08-12 RX ADMIN — ACETAMINOPHEN 650 MG: 325 TABLET ORAL at 05:02

## 2021-08-12 RX ADMIN — PANTOPRAZOLE SODIUM 40 MG: 40 TABLET, DELAYED RELEASE ORAL at 05:01

## 2021-08-12 RX ADMIN — TIZANIDINE 4 MG: 4 TABLET ORAL at 05:02

## 2021-08-12 RX ADMIN — PROMETHAZINE HYDROCHLORIDE 6.25 MG: 25 INJECTION INTRAMUSCULAR; INTRAVENOUS at 16:00

## 2021-08-12 ASSESSMENT — PAIN SCALES - GENERAL
PAINLEVEL_OUTOF10: 6
PAINLEVEL_OUTOF10: 6
PAINLEVEL_OUTOF10: 4

## 2021-08-12 NOTE — PLAN OF CARE
Problem: Airway Clearance - Ineffective  Goal: Achieve or maintain patent airway  8/12/2021 0912 by Zohaib Argueta RN  Outcome: Ongoing  8/11/2021 2303 by Tiffany Bradley RN  Outcome: Ongoing     Problem: Gas Exchange - Impaired  Goal: Absence of hypoxia  8/12/2021 0912 by Zohaib Argueta RN  Outcome: Ongoing  8/11/2021 2303 by Tiffany Bradley RN  Outcome: Ongoing  Goal: Promote optimal lung function  8/11/2021 2303 by Tiffany Bradley RN  Outcome: Ongoing     Problem: Breathing Pattern - Ineffective  Goal: Ability to achieve and maintain a regular respiratory rate  8/11/2021 2303 by Tiffany Bradley RN  Outcome: Ongoing     Problem:  Body Temperature -  Risk of, Imbalanced  Goal: Ability to maintain a body temperature within defined limits  8/11/2021 2303 by Tiffany Bradley RN  Outcome: Ongoing  Goal: Will regain or maintain usual level of consciousness  8/11/2021 2303 by Tiffany Bradley RN  Outcome: Ongoing  Goal: Complications related to the disease process, condition or treatment will be avoided or minimized  8/11/2021 2303 by Tiffany Bradley RN  Outcome: Ongoing     Problem: Isolation Precautions - Risk of Spread of Infection  Goal: Prevent transmission of infection  8/11/2021 2303 by Tiffany Bradley RN  Outcome: Ongoing     Problem: Nutrition Deficits  Goal: Optimize nutritional status  8/11/2021 2303 by Tiffany Bradley RN  Outcome: Ongoing     Problem: Risk for Fluid Volume Deficit  Goal: Maintain normal heart rhythm  8/11/2021 2303 by Tiffany Bradley RN  Outcome: Ongoing  Goal: Maintain absence of muscle cramping  8/11/2021 2303 by Tiffany Bradley RN  Outcome: Ongoing  Goal: Maintain normal serum potassium, sodium, calcium, phosphorus, and pH  8/11/2021 2303 by Tiffany Bradley RN  Outcome: Ongoing     Problem: Loneliness or Risk for Loneliness  Goal: Demonstrate positive use of time alone when socialization is not possible  8/11/2021 2303 by Tiffany Bradley RN  Outcome: Ongoing     Problem: Fatigue  Goal:

## 2021-08-12 NOTE — CONSULTS
**Physician Signature**  This document was electronically signed by: Cheryle John MD  2021   10:57 AM    **Consult Information**  Member Facility: [address] [place] [address] MRN: [id number]  Visit/Encounter Number: [id number]  Consult ID: [id number]  Facility Time Zone: CT  Date and Time of Request: 2021 10:54 AM  CT  Requesting Clinician: DR Keaton Sykes  Patient Name: Edita Welch  YOB: 1960  Gender: Male  Patient identity was confirmed at the beginning of the consult with the   patient/family/staff using two personal identifiers: Patient name and       **Reason for Consult**  Reason for Consult: [facility]    **Admission**  Admission Date: 2021  Chief reason for ICU admission: COVID - 19    **Core Metrics**  General orienting sentence for patient: : 58 yo man got 1st dose of   Matos Peter , dx COVID 19 , briefly at outside hospital, outpt decadron,   admitted , now on Remdesivir, decadron. Also got Toci. Glucoses high, ?   diabetic. 2L nasal cannula. Leukopenic, plts 96K. C/o Left ankle pain with   no redness/swelling. Has hardware.  Bradycardic on sotalol, apparently   chronic  Chief physiologic deterioration: None - Stable patient  Is the patient on DVT prophylaxis?: Yes  Prophylaxis type: Pharmacological  DVT Prophylaxis Comments: Lovenox  Is the patient on GI prophylaxis?: Not Indicated  Has this patient reached their nutritional goal?: Yes  Are there current issues with pain management in this patient?: Yes and   issues are being addressed  Are there issues with skin integrity?: No  Are there issues with delirium?: No  Has the patient been mobilized?: Yes  Is this patient currently intubated?: No  Are there ethical or care philosophy or family issues?: No  Do you recommend an in depth evaluation?: No    **Physician Signature**  This document was electronically signed by: Cheryle John MD  2021   10:57 AM
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
## 2021-08-12 NOTE — PROGRESS NOTES
The Pharmacist should review the patient information to make sure criteria for use is met prior to dispensing. If the documentation does not support the criteria, the pharmacist should call the MD to verify the criteria is met. It is preferred that the provider is ID, intensivist, or pulmonary specialist, however the pharmacist can use clinical judgement as to the appropriateness of the order.     [facility] Tocilizumab Criteria for Use    Criteria **All criteria need to be met to receive Tocilizumab for COVID-19 patients**   Age  >22 years old    Clinical Status  Within 7 days of symptom onset OR within 2 days of hospital admission   If requiring initiation of vital organ (respiratory/cardiovascular) support, must start tocilizumab within 24 hours of initiation*   Concomitant Therapy Receiving systemic corticosteroids    Oxygen Saturation  <92% OR requiring oxygen    Laboratory Results  Positive COVID-19 test within 72 hours of admission  CRP >7.5 mg/dL   (>75mg/L)   Contraindications Invasive active mycobacterial or fungal infection  Platelets < 353,966 or active bleeding   Not receiving systemic steroids   Significant immunosuppression   Ordering Provider Type  ID, intensivists, pulmonology (where available)     *Respiratory support includes but is not limited to: Non-invasive/invasive ventilation   *Cardiovascular support includes but is not limited to: Vasopressor support       Weight-Based Tocilizumab Dose (x 1 dose only)  Patient Weight (kg) Tocilizumab (Actemra) Dose   <= 40 kg 8 mg/kg  x 1 dose   >40 kg - <= 65 kg 400 mg x 1 dose   > 65 - <=90 kg 600 mg  x 1 dose   > 90 kg  800mg  x 1 dose        Alternative Therapies if Tocilizumab Unavailable   Dexamethasone (PO/IV) 20mg daily taper   Methylprednisolone 125mg IV daily taper

## 2021-08-12 NOTE — ACP (ADVANCE CARE PLANNING)
Advance Care Planning     Advance Care Planning Activator (Inpatient)  Conversation Note      Date of ACP Conversation: 8/12/2021     Conversation Conducted with: Patient    ACP Activator: Yvon Benson      Current Designated Health Care Decision Maker:     Primary Decision Maker: Cristina Mattson - Brother/Sister - [phone]      Care Preferences    Ventilation: \"If you were in your present state of health and suddenly became very ill and were unable to breathe on your own, what would your preference be about the use of a ventilator (breathing machine) if it were available to you? \"      Would the patient desire the use of ventilator (breathing machine)?: Yes    \"If your health worsens and it becomes clear that your chance of recovery is unlikely, what would your preference be about the use of a ventilator (breathing machine) if it were available to you? \"     Would the patient desire the use of ventilator (breathing machine)?: Yes      Resuscitation  \"CPR works best to restart the heart when there is a sudden event, like a heart attack, in someone who is otherwise healthy. Unfortunately, CPR does not typically restart the heart for people who have serious health conditions or who are very sick. \"    \"In the event your heart stopped as a result of an underlying serious health condition, would you want attempts to be made to restart your heart (answer \"yes\" for attempt to resuscitate) or would you prefer a natural death (answer \"no\" for do not attempt to resuscitate)? \" Yes       [] Yes   [x] No   Educated Patient / Marciauel Ego regarding differences between Advance Directives and portable DNR orders. Conversation Outcomes:  [x] ACP discussion completed  [x] Existing advance directive reviewed with patient; no changes to patient's previously recorded wishes     Pt says his brother is his POA, no copy in pt's chart at this time.      Electronically signed by Yvon Benson on 8/12/2021 at 11:34 AM

## 2021-08-12 NOTE — PROGRESS NOTES
[facility] Hospitalists        Hospitalist Progress Note  8/12/2021 5:33 PM  Subjective:   Admit Date: 8/11/2021  PCP: Rani Wood MD    Chief Complaint: Dyspnea    Subjective: Patient seen and examined at bedside in CCU. Denies chest pain. Breathing improving. Eating well. Appears comfortable. Cumulative Hospital History: 17-year-old obese male with a past medical history of alcoholic cirrhosis, COPD, diabetes, hypertension, atrial fibrillation on sotalol not on anticoagulation, Covid positive since 7/23/2021 presenting to the hospital for dyspnea found to be hypoxic admitted for further management. Tocilizumab given. ROS: 14 point review of systems is negative except as specifically addressed above. ADULT DIET; Regular; 4 carb choices (60 gm/meal);  Low Fat/Low Chol/High Fiber/2 gm Na    Intake/Output Summary (Last 24 hours) at 8/12/2021 1733  Last data filed at 8/12/2021 1600  Gross per 24 hour   Intake 1450 ml   Output 2400 ml   Net -950 ml     Medications:   dextrose      sodium chloride       Current Facility-Administered Medications   Medication Dose Route Frequency Provider Last Rate Last Admin    linaclotide (LINZESS) capsule 145 mcg  145 mcg Oral QAM AC Adrian Horan MD   145 mcg at 08/12/21 0501    insulin lispro (HUMALOG) injection vial 0-12 Units  0-12 Units Subcutaneous TID  Sebastian Gill MD   6 Units at 08/12/21 1230    insulin lispro (HUMALOG) injection vial 0-6 Units  0-6 Units Subcutaneous Nightly Sebastian Gill MD        insulin glargine (LANTUS) injection vial 20 Units  20 Units Subcutaneous Nightly Sebastian Gill MD        insulin lispro (HUMALOG) injection vial 5 Units  5 Units Subcutaneous TID  Sebastian Gill MD   5 Units at 08/12/21 1600    glucose (GLUTOSE) 40 % oral gel 15 g  15 g Oral PRN Sebastian Gill MD        dextrose 50 % IV solution  12.5 g Intravenous PRN Sebastian Gill MD        glucagon (rDNA) injection 1 mg  1 mg Intramuscular PRN MD Katherine Brar dextrose 5 % solution  100 mL/hr Intravenous PRN Dima Harvey MD        [START ON 8/13/2021] remdesivir 100 mg in sodium chloride 0.9 % 250 mL IVPB  100 mg Intravenous Q24H Dima Harvey MD        0.9 % sodium chloride bolus  30 mL Intravenous PRN Dima Harvey MD        [START ON 8/13/2021] isosorbide mononitrate (IMDUR) extended release tablet 60 mg  60 mg Oral Daily Dima Harvey MD        isosorbide mononitrate (IMDUR) extended release tablet 30 mg  30 mg Oral Once Dima Harvey MD        hydrALAZINE (APRESOLINE) tablet 25 mg  25 mg Oral 3 times per day Dima Harvey MD        iopamidol (ISOVUE-370) 76 % injection 90 mL  90 mL Intravenous ONCE PRN Jovanna Pinzon MD        folic acid (FOLVITE) tablet 1 mg  1 mg Oral Daily Indira Sevilla MD   1 mg at 08/12/21 0732    nitroGLYCERIN (NITROSTAT) SL tablet 0.4 mg  0.4 mg Sublingual Q5 Min PRN Indira Sevilla MD        pantoprazole (PROTONIX) tablet 40 mg  40 mg Oral QAM AC Indira Sevilla MD   40 mg at 08/12/21 0501    sotalol (BETAPACE) tablet 80 mg  80 mg Oral BID Indira Sevilla MD   80 mg at 08/12/21 0800    spironolactone (ALDACTONE) tablet 50 mg  50 mg Oral Daily Indira Sevilla MD   50 mg at 08/12/21 0732    tamsulosin (FLOMAX) capsule 0.4 mg  0.4 mg Oral Daily Indira Sevilla MD   0.4 mg at 08/12/21 0732    verapamil (CALAN SR) extended release tablet 120 mg  120 mg Oral Nightly Indira Sevilla MD   120 mg at 08/11/21 2256    tiZANidine (ZANAFLEX) tablet 4 mg  4 mg Oral Q8H PRN Indira Sevilla MD   4 mg at 08/12/21 0502    sodium chloride flush 0.9 % injection 5-40 mL  5-40 mL Intravenous 2 times per day Indira Sevilla MD   10 mL at 08/12/21 0800    sodium chloride flush 0.9 % injection 5-40 mL  5-40 mL Intravenous PRN Indira Sevilla MD        0.9 % sodium chloride infusion  25 mL Intravenous PRN Indira Sevilla MD        enoxaparin (LOVENOX) injection 30 mg  30 mg Subcutaneous BID Indira Sevilla MD   30 mg at 08/12/21 0830    acetaminophen (TYLENOL) tablet 650 mg  650 mg Oral Q6H PRN Adrian Horan MD   650 mg at 08/12/21 0502    Or    acetaminophen (TYLENOL) suppository 650 mg  650 mg Rectal Q6H PRN Adrian Horan MD        guaiFENesin-dextromethorphan [facility] DM) 100-10 MG/5ML syrup 5 mL  5 mL Oral Q4H PRN Adrian Horan MD        Vitamin D (CHOLECALCIFEROL) tablet 2,000 Units  2,000 Units Oral Daily Adrian Horan MD   2,000 Units at 08/12/21 0731    dexamethasone (DECADRON) tablet 6 mg  6 mg Oral Daily Adrian Horan MD   6 mg at 08/11/21 2118    polyethylene glycol (GLYCOLAX) packet 17 g  17 g Oral Daily PRN Adrian Horan MD        melatonin disintegrating tablet 5 mg  5 mg Oral Nightly PRN Adrian Horan MD   5 mg at 08/11/21 2110    calcium carbonate (TUMS) chewable tablet 500 mg  500 mg Oral TID PRN Adrian Horan MD        albuterol sulfate  (90 Base) MCG/ACT inhaler 2 puff  2 puff Inhalation Q4H PRN Adrian Horan MD        fluticasone-umeclidin-vilant (TRELEGY ELLIPTA) 589-42.6-19 MCG/INH inhaler 1 puff  1 puff Inhalation Daily Adrian Horan MD        promethazine [facility]) injection 6.25 mg  6.25 mg Intramuscular Q6H PRN Adrian Horan MD   6.25 mg at 08/12/21 1600        Labs:     Recent Labs     08/11/21  1806 08/12/21  0304   WBC 5.5 1.6*   RBC 4.42* 3.45*   HGB 12.6* 9.9*   HCT 38.8* 31.3*   MCV 87.8 90.7   MCH 28.5 28.7   MCHC 32.5* 31.6*    96*     Recent Labs     08/11/21  1727 08/11/21  1806 08/12/21  0304   NA  --  139 134*   K 3.5 3.7 4.7   ANIONGAP  --  14 10   CL  --  101 98   CO2  --  24 26   BUN  --  12 17   CREATININE  --  0.7 1.2   GLUCOSE  --  116* 475*   CALCIUM  --  9.4 8.2*     No results for input(s): MG, PHOS in the last 72 hours. Recent Labs     08/11/21 1806   AST 27   ALT 24   BILITOT 1.0   ALKPHOS 97     ABGs:No results for input(s): PH, PO2, PCO2, HCO3, BE, O2SAT in the last 72 hours.   Troponin T:   Recent Labs     08/11/21 1806   TROPONINI <0.01     INR:   Recent Labs 08/11/21  1806   INR 1.31*     Lactic Acid:   Recent Labs     08/11/21  1806   LACTA 2.4*       Objective:   Vitals: BP (!) 178/70   Pulse 62   Temp 98.6 °F (37 °C) (Oral)   Resp (!) 32   Wt 259 lb 11.2 oz (117.8 kg)   SpO2 97%   BMI 38.35 kg/m²   24HR INTAKE/OUTPUT:      Intake/Output Summary (Last 24 hours) at 8/12/2021 1733  Last data filed at 8/12/2021 1600  Gross per 24 hour   Intake 1450 ml   Output 2400 ml   Net -950 ml     General appearance: Alert, obese  HEENT: AT/NC  Lungs: BLAE  Heart: RRR  Abdomen: BS+, obese  Extremities: pulses+  Neurologic: Alert, gross motor function intact  Skin: warm    Assessment and Plan: Active Problems:    Acute hypoxemic respiratory failure due to severe acute respiratory syndrome coronavirus 2 (SARS-CoV-2) disease ([facility]    Palliative care patient  Resolved Problems:    * No resolved hospital problems. *    Covid: Monitor saturations. Bronchodilators. Tocilizumab given. Decadron. Remdesivir. Vitamin D. Atrial fibrillation: On anticoagulation. Sotalol continues. COPD: Bronchodilators. Monitor O2 saturations. Cirrhosis/pancytopenia: PPI. Diuretics. Monitor I's and O's. Former alcoholic. Monitor CBC. DM: Monitor BG and adjust medication as needed. Hypertension: Monitor BP and adjust medication as needed. Supportive management. Advance Directive: Full Code    DVT prophylaxis: Lovenox    Discharge planning: TBD- transferred to 4th floor Covid.       Signed:  Elyse Frias MD 8/12/2021 5:33 PM  Rounding Hospitalist

## 2021-08-12 NOTE — PROGRESS NOTES
Palliative Care/Spiritual Care: Spoke with pt on the phone to initiate palliative care. Pt says she had shortness of breath, cough, says he has pneumonia and is on oxygen. 's note says pt has a history of COPD. Advance Directives: Pt says his brother is his POA and his medical decision maker if he is unable. Pt says he wants CPR and Ventilator but if he is in a coma, he says, \"I don't want to be a burden to anyone and I want to be taken off. I don't want to live on a ventilator. \" SEE ACP NOTE. Pain/other symptoms: Pt says he is having pain, saying he foot hurts and he notified his nurse. Social/Spiritual: Pt says he has a friend he does Bible study every night. Pt/family discussion r/t goals: Pt has one brother and lives at home alone. Pt says at home he is fully independent and is able to care for all of his daily needs. Pt ambulates without assistance and cars for himself. Pt says his goal is to return home to his dogs and to previous quality of life with previous daily living skills. Pt shared he had dogs at home who needed care. This  called Dorie Cowden who was going to call the [facility] to determine if there are foster families who might help. Jessica to get back with me. Provided spiritual care with sustaining presence, nurtured hope, and prayer. Pt expressed gratitude for spiritual care.     Electronically signed by Orville Kirkpatrick on 8/12/2021 at 11:30 AM

## 2021-08-13 LAB
ALBUMIN SERPL-MCNC: 2.8 G/DL (ref 3.5–5.2)
ALP BLD-CCNC: 73 U/L (ref 40–130)
ALT SERPL-CCNC: 18 U/L (ref 5–41)
ANION GAP SERPL CALCULATED.3IONS-SCNC: 11 MMOL/L (ref 7–19)
AST SERPL-CCNC: 15 U/L (ref 5–40)
BASOPHILS ABSOLUTE: 0 K/UL (ref 0–0.2)
BASOPHILS RELATIVE PERCENT: 0 % (ref 0–1)
BILIRUB SERPL-MCNC: 0.5 MG/DL (ref 0.2–1.2)
BUN BLDV-MCNC: 19 MG/DL (ref 8–23)
CALCIUM SERPL-MCNC: 8.7 MG/DL (ref 8.8–10.2)
CHLORIDE BLD-SCNC: 100 MMOL/L (ref 98–111)
CO2: 24 MMOL/L (ref 22–29)
CREAT SERPL-MCNC: 0.6 MG/DL (ref 0.5–1.2)
EOSINOPHILS ABSOLUTE: 0 K/UL (ref 0–0.6)
EOSINOPHILS RELATIVE PERCENT: 0 % (ref 0–5)
GFR AFRICAN AMERICAN: >59
GFR NON-AFRICAN AMERICAN: >60
GLUCOSE BLD-MCNC: 233 MG/DL (ref 70–99)
GLUCOSE BLD-MCNC: 272 MG/DL (ref 70–99)
GLUCOSE BLD-MCNC: 320 MG/DL (ref 74–109)
GLUCOSE BLD-MCNC: 344 MG/DL (ref 70–99)
HCT VFR BLD CALC: 31.6 % (ref 42–52)
HEMOGLOBIN: 10 G/DL (ref 14–18)
IMMATURE GRANULOCYTES #: 0 K/UL
LACTIC ACID: 1 MMOL/L (ref 0.5–1.9)
LYMPHOCYTES ABSOLUTE: 0.1 K/UL (ref 1.1–4.5)
LYMPHOCYTES RELATIVE PERCENT: 7 % (ref 20–40)
MAGNESIUM: 1.8 MG/DL (ref 1.6–2.4)
MCH RBC QN AUTO: 28.2 PG (ref 27–31)
MCHC RBC AUTO-ENTMCNC: 31.6 G/DL (ref 33–37)
MCV RBC AUTO: 89.3 FL (ref 80–94)
MONOCYTES ABSOLUTE: 0 K/UL (ref 0–0.9)
MONOCYTES RELATIVE PERCENT: 0 % (ref 0–10)
NEUTROPHILS ABSOLUTE: 1.6 K/UL (ref 1.5–7.5)
NEUTROPHILS RELATIVE PERCENT: 93 % (ref 50–65)
OVALOCYTES: ABNORMAL
PDW BLD-RTO: 13.3 % (ref 11.5–14.5)
PERFORMED ON: ABNORMAL
PLATELET # BLD: 106 K/UL (ref 130–400)
PLATELET SLIDE REVIEW: ADEQUATE
PMV BLD AUTO: 11.8 FL (ref 9.4–12.4)
POTASSIUM SERPL-SCNC: 4.6 MMOL/L (ref 3.5–5)
RBC # BLD: 3.54 M/UL (ref 4.7–6.1)
SODIUM BLD-SCNC: 135 MMOL/L (ref 136–145)
TOTAL PROTEIN: 6.2 G/DL (ref 6.6–8.7)
TROPONIN: <0.01 NG/ML (ref 0–0.03)
WBC # BLD: 1.7 K/UL (ref 4.8–10.8)

## 2021-08-13 PROCEDURE — 2100000000 HC CCU R&B

## 2021-08-13 PROCEDURE — 2580000003 HC RX 258: Performed by: HOSPITALIST

## 2021-08-13 PROCEDURE — 6370000000 HC RX 637 (ALT 250 FOR IP): Performed by: INTERNAL MEDICINE

## 2021-08-13 PROCEDURE — 85025 COMPLETE CBC W/AUTO DIFF WBC: CPT

## 2021-08-13 PROCEDURE — 82947 ASSAY GLUCOSE BLOOD QUANT: CPT

## 2021-08-13 PROCEDURE — 80053 COMPREHEN METABOLIC PANEL: CPT

## 2021-08-13 PROCEDURE — 6360000002 HC RX W HCPCS

## 2021-08-13 PROCEDURE — 2580000003 HC RX 258: Performed by: INTERNAL MEDICINE

## 2021-08-13 PROCEDURE — 6360000002 HC RX W HCPCS: Performed by: HOSPITALIST

## 2021-08-13 PROCEDURE — 2500000003 HC RX 250 WO HCPCS: Performed by: INTERNAL MEDICINE

## 2021-08-13 PROCEDURE — 83735 ASSAY OF MAGNESIUM: CPT

## 2021-08-13 PROCEDURE — 84484 ASSAY OF TROPONIN QUANT: CPT

## 2021-08-13 PROCEDURE — 93005 ELECTROCARDIOGRAM TRACING: CPT | Performed by: INTERNAL MEDICINE

## 2021-08-13 PROCEDURE — 94660 CPAP INITIATION&MGMT: CPT

## 2021-08-13 PROCEDURE — 6370000000 HC RX 637 (ALT 250 FOR IP): Performed by: HOSPITALIST

## 2021-08-13 PROCEDURE — 6360000002 HC RX W HCPCS: Performed by: INTERNAL MEDICINE

## 2021-08-13 PROCEDURE — 2500000003 HC RX 250 WO HCPCS

## 2021-08-13 PROCEDURE — 2700000000 HC OXYGEN THERAPY PER DAY

## 2021-08-13 PROCEDURE — 83605 ASSAY OF LACTIC ACID: CPT

## 2021-08-13 RX ORDER — HYDROCORTISONE 25 MG/G
CREAM TOPICAL 2 TIMES DAILY
Status: DISCONTINUED | OUTPATIENT
Start: 2021-08-13 | End: 2021-08-18 | Stop reason: HOSPADM

## 2021-08-13 RX ORDER — LIDOCAINE 4 G/G
1 PATCH TOPICAL DAILY PRN
Status: DISCONTINUED | OUTPATIENT
Start: 2021-08-13 | End: 2021-08-18 | Stop reason: HOSPADM

## 2021-08-13 RX ORDER — DILTIAZEM HYDROCHLORIDE 5 MG/ML
10 INJECTION INTRAVENOUS ONCE
Status: COMPLETED | OUTPATIENT
Start: 2021-08-13 | End: 2021-08-13

## 2021-08-13 RX ORDER — INSULIN GLARGINE 100 [IU]/ML
20 INJECTION, SOLUTION SUBCUTANEOUS 2 TIMES DAILY
Status: DISCONTINUED | OUTPATIENT
Start: 2021-08-13 | End: 2021-08-15

## 2021-08-13 RX ORDER — DILTIAZEM HCL/D5W 125 MG/125
5-15 PLASTIC BAG, INJECTION (ML) INTRAVENOUS CONTINUOUS
Status: DISCONTINUED | OUTPATIENT
Start: 2021-08-13 | End: 2021-08-13 | Stop reason: SDUPTHER

## 2021-08-13 RX ORDER — DILTIAZEM HYDROCHLORIDE 5 MG/ML
INJECTION INTRAVENOUS
Status: COMPLETED
Start: 2021-08-13 | End: 2021-08-13

## 2021-08-13 RX ORDER — MORPHINE SULFATE 4 MG/ML
INJECTION, SOLUTION INTRAMUSCULAR; INTRAVENOUS DAILY PRN
Status: COMPLETED | OUTPATIENT
Start: 2021-08-13 | End: 2021-08-13

## 2021-08-13 RX ORDER — MORPHINE SULFATE 4 MG/ML
INJECTION, SOLUTION INTRAMUSCULAR; INTRAVENOUS
Status: COMPLETED
Start: 2021-08-13 | End: 2021-08-13

## 2021-08-13 RX ORDER — NITROGLYCERIN 0.4 MG/1
TABLET SUBLINGUAL DAILY PRN
Status: COMPLETED | OUTPATIENT
Start: 2021-08-13 | End: 2021-08-13

## 2021-08-13 RX ADMIN — MORPHINE SULFATE 2 MG: 4 INJECTION INTRAVENOUS at 18:02

## 2021-08-13 RX ADMIN — NITROGLYCERIN 0.4 MG: 0.4 TABLET SUBLINGUAL at 18:03

## 2021-08-13 RX ADMIN — DILTIAZEM HYDROCHLORIDE 10 MG: 5 INJECTION INTRAVENOUS at 20:44

## 2021-08-13 RX ADMIN — Medication 2 MG: at 17:56

## 2021-08-13 RX ADMIN — ENOXAPARIN SODIUM 30 MG: 30 INJECTION SUBCUTANEOUS at 08:29

## 2021-08-13 RX ADMIN — REMDESIVIR 100 MG: 100 INJECTION, POWDER, LYOPHILIZED, FOR SOLUTION INTRAVENOUS at 09:51

## 2021-08-13 RX ADMIN — SOTALOL HYDROCHLORIDE 80 MG: 80 TABLET ORAL at 08:29

## 2021-08-13 RX ADMIN — SODIUM CHLORIDE, PRESERVATIVE FREE 10 ML: 5 INJECTION INTRAVENOUS at 08:29

## 2021-08-13 RX ADMIN — FOLIC ACID 1 MG: 1 TABLET ORAL at 08:29

## 2021-08-13 RX ADMIN — Medication 20 UNITS: at 08:33

## 2021-08-13 RX ADMIN — SOTALOL HYDROCHLORIDE 80 MG: 80 TABLET ORAL at 20:45

## 2021-08-13 RX ADMIN — PROMETHAZINE HYDROCHLORIDE 6.25 MG: 25 INJECTION INTRAMUSCULAR; INTRAVENOUS at 20:46

## 2021-08-13 RX ADMIN — DILTIAZEM HYDROCHLORIDE 10 MG/HR: 5 INJECTION INTRAVENOUS at 19:50

## 2021-08-13 RX ADMIN — Medication 20 UNITS: at 20:45

## 2021-08-13 RX ADMIN — TAMSULOSIN HYDROCHLORIDE 0.4 MG: 0.4 CAPSULE ORAL at 08:29

## 2021-08-13 RX ADMIN — ISOSORBIDE MONONITRATE 60 MG: 60 TABLET, EXTENDED RELEASE ORAL at 08:29

## 2021-08-13 RX ADMIN — HYDRALAZINE HYDROCHLORIDE 25 MG: 25 TABLET, FILM COATED ORAL at 05:40

## 2021-08-13 RX ADMIN — SPIRONOLACTONE 50 MG: 50 TABLET ORAL at 08:29

## 2021-08-13 RX ADMIN — Medication 2000 UNITS: at 08:29

## 2021-08-13 RX ADMIN — INSULIN LISPRO 5 UNITS: 100 INJECTION, SOLUTION INTRAVENOUS; SUBCUTANEOUS at 07:11

## 2021-08-13 RX ADMIN — SODIUM CHLORIDE, PRESERVATIVE FREE 10 ML: 5 INJECTION INTRAVENOUS at 20:51

## 2021-08-13 RX ADMIN — HYDRALAZINE HYDROCHLORIDE 25 MG: 25 TABLET, FILM COATED ORAL at 14:16

## 2021-08-13 RX ADMIN — PANTOPRAZOLE SODIUM 40 MG: 40 TABLET, DELAYED RELEASE ORAL at 07:02

## 2021-08-13 RX ADMIN — HYDROCORTISONE 2.5%: 25 CREAM TOPICAL at 20:46

## 2021-08-13 RX ADMIN — TIZANIDINE 4 MG: 4 TABLET ORAL at 02:24

## 2021-08-13 RX ADMIN — GUAIFENESIN AND DEXTROMETHORPHAN 5 ML: 100; 10 SYRUP ORAL at 07:02

## 2021-08-13 RX ADMIN — ENOXAPARIN SODIUM 30 MG: 30 INJECTION SUBCUTANEOUS at 20:45

## 2021-08-13 RX ADMIN — DEXAMETHASONE 6 MG: 4 TABLET ORAL at 18:11

## 2021-08-13 RX ADMIN — HYDRALAZINE HYDROCHLORIDE 25 MG: 25 TABLET, FILM COATED ORAL at 20:45

## 2021-08-13 RX ADMIN — LINACLOTIDE 145 MCG: 145 CAPSULE, GELATIN COATED ORAL at 07:02

## 2021-08-13 ASSESSMENT — PAIN SCALES - GENERAL
PAINLEVEL_OUTOF10: 0
PAINLEVEL_OUTOF10: 5
PAINLEVEL_OUTOF10: 0

## 2021-08-13 ASSESSMENT — PAIN DESCRIPTION - LOCATION: LOCATION: BACK

## 2021-08-13 ASSESSMENT — PAIN DESCRIPTION - FREQUENCY: FREQUENCY: CONTINUOUS

## 2021-08-13 ASSESSMENT — PAIN DESCRIPTION - PAIN TYPE: TYPE: CHRONIC PAIN

## 2021-08-13 ASSESSMENT — PAIN DESCRIPTION - DESCRIPTORS: DESCRIPTORS: CONSTANT;ACHING

## 2021-08-13 NOTE — PROGRESS NOTES
Rapid response called for chest pain. Patient just transferred from CCU to routine medical floor Covid unit when he began to experience midsternal chest pain associated with palpitations. Patient found to have heart rate greater than 150, atrial fibrillation with RVR. Patient has a history of atrial fibrillation and is on sotalol at home. Patient saturating well on room air. Other vitals stable. Troponin pending. Patient transition back to CCU for Cardizem drip. Cardiology consulted.

## 2021-08-13 NOTE — PROGRESS NOTES
ists        Hospitalist Progress Note  8/13/2021 2:05 PM  Subjective:   Admit Date: 8/11/2021  PCP: Frank Sharif MD    Chief Complaint: Dyspnea    Subjective: Patient seen and examined at bedside in CCU. Comfortable. Still feels \"rough. \"    Cumulative Hospital History: 70-year-old obese male with a past medical history of alcoholic cirrhosis, COPD, diabetes, hypertension, atrial fibrillation on sotalol not on anticoagulation, Covid positive since 7/23/2021 presenting to the hospital for dyspnea found to be hypoxic admitted for further management. Tocilizumab given. ROS: 14 point review of systems is negative except as specifically addressed above. ADULT DIET; Regular; 4 carb choices (60 gm/meal);  Low Fat/Low Chol/High Fiber/2 gm Na    Intake/Output Summary (Last 24 hours) at 8/13/2021 1405  Last data filed at 8/13/2021 1230  Gross per 24 hour   Intake 1205 ml   Output 4000 ml   Net -2795 ml     Medications:   dextrose      sodium chloride       Current Facility-Administered Medications   Medication Dose Route Frequency Provider Last Rate Last Admin    lidocaine 4 % external patch 1 patch  1 patch Transdermal Daily PRN Deangelo Byrne MD   1 patch at 08/13/21 0226    insulin glargine (LANTUS) injection vial 20 Units  20 Units Subcutaneous BID Ros Cortez MD   20 Units at 08/13/21 0833    insulin lispro (HUMALOG) injection vial 12 Units  12 Units Subcutaneous TID  Ros Cortez MD   12 Units at 08/13/21 1128    linaclotide (LINZESS) capsule 145 mcg  145 mcg Oral QAM  Deangelo Byrne MD   145 mcg at 08/13/21 0702    insulin lispro (HUMALOG) injection vial 0-12 Units  0-12 Units Subcutaneous TID  Ros Cortez MD   6 Units at 08/13/21 1128    insulin lispro (HUMALOG) injection vial 0-6 Units  0-6 Units Subcutaneous Nightly Ros Cortez MD   4 Units at 08/12/21 2014    glucose (GLUTOSE) 40 % oral gel 15 g  15 g Oral PRN Ros Cortez MD        dextrose 50 % IV solution  12.5 g Intravenous PRN Stephanie Paulson MD        glucagon (rDNA) injection 1 mg  1 mg Intramuscular PRN Stephanie Paulson MD        dextrose 5 % solution  100 mL/hr Intravenous PRN Stephanie Paulson MD        remdesivir 100 mg in sodium chloride 0.9 % 250 mL IVPB  100 mg Intravenous Q24H Stephanie Paulson MD   Stopped at 08/13/21 1021    0.9 % sodium chloride bolus  30 mL Intravenous PRN Stephanie Paulson MD        isosorbide mononitrate (IMDUR) extended release tablet 60 mg  60 mg Oral Daily Stephanie Paulson MD   60 mg at 08/13/21 0829    hydrALAZINE (APRESOLINE) tablet 25 mg  25 mg Oral 3 times per day Stephanie Paulson MD   25 mg at 08/13/21 0540    iopamidol (ISOVUE-370) 76 % injection 90 mL  90 mL Intravenous ONCE PRN Wayne Mccurdy MD        folic acid (FOLVITE) tablet 1 mg  1 mg Oral Daily Paulino Smith MD   1 mg at 08/13/21 0829    nitroGLYCERIN (NITROSTAT) SL tablet 0.4 mg  0.4 mg Sublingual Q5 Min PRN Paulino Smith MD        pantoprazole (PROTONIX) tablet 40 mg  40 mg Oral QAM AC Paulino Smith MD   40 mg at 08/13/21 3655    sotalol (BETAPACE) tablet 80 mg  80 mg Oral BID Paulino Smith MD   80 mg at 08/13/21 5581    spironolactone (ALDACTONE) tablet 50 mg  50 mg Oral Daily Paulino Smith MD   50 mg at 08/13/21 0829    tamsulosin (FLOMAX) capsule 0.4 mg  0.4 mg Oral Daily Paulino Smith MD   0.4 mg at 08/13/21 3643    verapamil (CALAN SR) extended release tablet 120 mg  120 mg Oral Nightly Paulino Smith MD   120 mg at 08/12/21 2013    tiZANidine (ZANAFLEX) tablet 4 mg  4 mg Oral Q8H PRN Paulino Smith MD   4 mg at 08/13/21 0224    sodium chloride flush 0.9 % injection 5-40 mL  5-40 mL Intravenous 2 times per day Paulino Smith MD   10 mL at 08/13/21 0829    sodium chloride flush 0.9 % injection 5-40 mL  5-40 mL Intravenous PRN Paulino Smith MD        0.9 % sodium chloride infusion  25 mL Intravenous PRN Paulino Smith MD        enoxaparin (LOVENOX) injection 30 mg  30 mg Subcutaneous BID Paulino Smith MD   30 mg at 08/13/21 0829    acetaminophen (TYLENOL) tablet 650 mg  650 mg Oral Q6H PRN Rosa Corley MD   650 mg at 08/12/21 0502    Or    acetaminophen (TYLENOL) suppository 650 mg  650 mg Rectal Q6H PRN Rosa Corley MD        guaiFENesin-dextromethorphan [facility] DM) 100-10 MG/5ML syrup 5 mL  5 mL Oral Q4H PRN Rosa Corley MD   5 mL at 08/13/21 8119    Vitamin D (CHOLECALCIFEROL) tablet 2,000 Units  2,000 Units Oral Daily Rosa Corley MD   2,000 Units at 08/13/21 0829    dexamethasone (DECADRON) tablet 6 mg  6 mg Oral Daily Rosa Corley MD   6 mg at 08/12/21 1741    polyethylene glycol (GLYCOLAX) packet 17 g  17 g Oral Daily PRN Rosa Corley MD        melatonin disintegrating tablet 5 mg  5 mg Oral Nightly PRN Rosa Corley MD   5 mg at 08/12/21 2333    calcium carbonate (TUMS) chewable tablet 500 mg  500 mg Oral TID PRN Rosa Corley MD        albuterol sulfate  (90 Base) MCG/ACT inhaler 2 puff  2 puff Inhalation Q4H PRN Rosa Corley MD        fluticasone-umeclidin-vilant (TRELEGY ELLIPTA) 968-15.0-86 MCG/INH inhaler 1 puff  1 puff Inhalation Daily Rosa Corley MD        [facility]) injection 6.25 mg  6.25 mg Intramuscular Q6H PRN Rosa Corley MD   6.25 mg at 08/12/21 2338        Labs:     Recent Labs     08/11/21  1806 08/12/21  0304 08/13/21  0415   WBC 5.5 1.6* 1.7*   RBC 4.42* 3.45* 3.54*   HGB 12.6* 9.9* 10.0*   HCT 38.8* 31.3* 31.6*   MCV 87.8 90.7 89.3   MCH 28.5 28.7 28.2   MCHC 32.5* 31.6* 31.6*    96* 106*     Recent Labs     08/11/21  1806 08/12/21  0304 08/13/21  0415    134* 135*   K 3.7 4.7 4.6   ANIONGAP 14 10 11    98 100   CO2 24 26 24   BUN 12 17 19   CREATININE 0.7 1.2 0.6   GLUCOSE 116* 475* 320*   CALCIUM 9.4 8.2* 8.7*     Recent Labs     08/13/21  0415   MG 1.8     Recent Labs     08/11/21  1806 08/13/21  0415   AST 27 15   ALT 24 18   BILITOT 1.0 0.5   ALKPHOS 97 73     ABGs:No results for input(s): PH, PO2, PCO2, HCO3, BE, O2SAT in the last 72 hours. Troponin T:   Recent Labs     08/11/21  1806   TROPONINI <0.01     INR:   Recent Labs     08/11/21  1806   INR 1.31*     Lactic Acid:   Recent Labs     08/13/21  0445   LACTA 1.0       Objective:   Vitals: BP (!) 151/57   Pulse 78   Temp 97.4 °F (36.3 °C) (Temporal)   Resp 20   Ht 5' 9\" (1.753 m)   Wt 246 lb 1.6 oz (111.6 kg)   SpO2 92%   BMI 36.34 kg/m²   24HR INTAKE/OUTPUT:      Intake/Output Summary (Last 24 hours) at 8/13/2021 1405  Last data filed at 8/13/2021 1230  Gross per 24 hour   Intake 1205 ml   Output 4000 ml   Net -2795 ml     General appearance: Alert, obese  HEENT: AT/NC  Lungs: BLAE  Heart: RRR  Abdomen: BS+, obese  Extremities: pulses+  Neurologic: Alert, gross motor function intact  Skin: warm    Assessment and Plan: Active Problems:    Acute hypoxemic respiratory failure due to severe acute respiratory syndrome coronavirus 2 (SARS-CoV-2) [facility]    Palliative care patient  Resolved Problems:    * No resolved hospital problems. *    Covid: Monitor saturations - currently on room air. Bronchodilators. Tocilizumab given. Decadron. Remdesivir day 2. Vitamin D. Atrial fibrillation: On anticoagulation. Sotalol continues. COPD: Bronchodilators. Monitor O2 saturations. Cirrhosis/pancytopenia: PPI. Diuretics. Monitor I's and O's. Former alcoholic. Monitor CBC. DM: Monitor BG and adjust medication as needed. Insulin increased today. Hypertension: Monitor BP and adjust medication as needed. Supportive management. PT/OT as tolerated. Advance Directive: Full Code    DVT prophylaxis: Lovenox    Discharge planning: TBD- transfer to 4th floor Covid once bed avilable.       Signed:  Iesha Wilcox MD 8/13/2021 2:05 PM  Rounding Hospitalist

## 2021-08-13 NOTE — PROGRESS NOTES
Veronica Stallworth received from [phone] to room # [phone] . Mental Status: Patient is oriented, alert, thought processes intact and able to concentrate and follow conversation. Vitals:    08/13/21 1755   BP: (!) 91/59   Pulse: 105   Resp: (!) 34   Temp: 97.5 °F (36.4 °C)   SpO2: 95%     Placed on cardiac monitor: Yes. Bedside monitor. Belongings with patient at bedside . Family at bedside No.  Oriented Patient to room. Call light within reach. Yes. Transfer was: Well tolerated by patient. .    Patient transferred back to CCU, following rapid rasponse chest pain, in which the patient was assessed to be in AFIB. Patient placed on bedside monitor, and verbal orders received by Dr. Efe Justice for cardizem bolus and gtt, and consult to cardiology. Patient's cardiac rhythm noted to be in ST, Dr. Efe Justice notified. Cardizem orders canceled.     Electronically signed by Elmira Oconnor RN on 8/13/2021 at 5:56 PM

## 2021-08-13 NOTE — PROGRESS NOTES
Amee Mckenna transferred to [facility] from [phone] via wheelchair. Reason for transfer: hemodynamically stable, no longer requiring continuous CCu bedside monitoring   Explained reason for transfer to Patient. Belongings with patient at bedside  Soft chart transferred with patient: Yes. Telemetry box number N/A transferred with patient: N/A. Report given to: Gretchen Hawkins, via telephone.       Electronically signed by Yusfu Israel RN on 8/13/2021 at 3:37 PM

## 2021-08-13 NOTE — CONSULTS
**Physician Signature**  This document was electronically signed by: Duke Adams MD  2021   10:45 AM    **Consult Information**  Member Facility: [address] [place] [address] Drive MRN: [id number]  Visit/Encounter Number: [id number]  Consult ID: [id number]  Facility Time Zone: CT  Date and Time of Request: 2021 06:08 AM  CT  Requesting Clinician: DR Guanaco Deluca  Patient Name: Alan Fletcher  YOB: 1960  Gender: Male  Patient identity was confirmed at the beginning of the consult with the   patient/family/staff using two personal identifiers: Patient name and       **Reason for Consult**  Reason for Consult: [facility]    **Admission**  Admission Date: 2021  Chief reason for ICU admission: COVID - 19    **Core Metrics**  General orienting sentence for patient: : 58 yo man got 1st dose of   Matos Peter , dx COVID 19 , briefly at outside hospital, outpt decadron,   admitted , now on remdesivir, decadron. Also got Toci. Glucoses high, ?   diabetic. 2L nasal cannula. Leukopenic, plts 96K. C/o Left ankle pain with   no redness/swelling. Has hardware. Bradycardic on sotalol, apparently   chronic. Chief physiologic deterioration: None - Stable patient  Is the patient on DVT prophylaxis?: Yes  Prophylaxis type: Pharmacological  DVT Prophylaxis Comments: Lovenox  Is the patient on GI prophylaxis?: Not Indicated  Has this patient reached their nutritional goal?: Yes  Are there current issues with pain management in this patient?: Yes and   issues are being addressed  Are there issues with skin integrity?: No  Are there issues with delirium?: No  Has the patient been mobilized?: Yes  Is this patient currently intubated?: No  Are there ethical or care philosophy or family issues?: No  Do you recommend an in depth evaluation?: No  Disposition Recommendations: Downgrade/transition to ICU    **Physician Signature**  This document was electronically signed by: Duke Adams MD 08/13/2021   10:45 AM

## 2021-08-14 LAB
ALBUMIN SERPL-MCNC: 3 G/DL (ref 3.5–5.2)
ALP BLD-CCNC: 72 U/L (ref 40–130)
ALT SERPL-CCNC: 21 U/L (ref 5–41)
ANION GAP SERPL CALCULATED.3IONS-SCNC: 10 MMOL/L (ref 7–19)
ANISOCYTOSIS: ABNORMAL
AST SERPL-CCNC: 21 U/L (ref 5–40)
BANDED NEUTROPHILS RELATIVE PERCENT: 6 % (ref 0–5)
BASOPHILS ABSOLUTE: 0 K/UL (ref 0–0.2)
BASOPHILS RELATIVE PERCENT: 0 % (ref 0–1)
BILIRUB SERPL-MCNC: 0.6 MG/DL (ref 0.2–1.2)
BUN BLDV-MCNC: 17 MG/DL (ref 8–23)
CALCIUM SERPL-MCNC: 9.1 MG/DL (ref 8.8–10.2)
CHLORIDE BLD-SCNC: 99 MMOL/L (ref 98–111)
CO2: 24 MMOL/L (ref 22–29)
CREAT SERPL-MCNC: 0.7 MG/DL (ref 0.5–1.2)
EKG P AXIS: 50 DEGREES
EKG P AXIS: NORMAL DEGREES
EKG P-R INTERVAL: 144 MS
EKG P-R INTERVAL: NORMAL MS
EKG Q-T INTERVAL: 286 MS
EKG Q-T INTERVAL: 364 MS
EKG QRS DURATION: 100 MS
EKG QRS DURATION: 98 MS
EKG QTC CALCULATION (BAZETT): 406 MS
EKG QTC CALCULATION (BAZETT): 457 MS
EKG T AXIS: -6 DEGREES
EKG T AXIS: 31 DEGREES
EOSINOPHILS ABSOLUTE: 0 K/UL (ref 0–0.6)
EOSINOPHILS RELATIVE PERCENT: 0 % (ref 0–5)
GFR AFRICAN AMERICAN: >59
GFR NON-AFRICAN AMERICAN: >60
GLUCOSE BLD-MCNC: 220 MG/DL (ref 70–99)
GLUCOSE BLD-MCNC: 220 MG/DL (ref 70–99)
GLUCOSE BLD-MCNC: 232 MG/DL (ref 70–99)
GLUCOSE BLD-MCNC: 305 MG/DL (ref 70–99)
GLUCOSE BLD-MCNC: 317 MG/DL (ref 74–109)
HCT VFR BLD CALC: 35.5 % (ref 42–52)
HEMOGLOBIN: 11.1 G/DL (ref 14–18)
IMMATURE GRANULOCYTES #: 0 K/UL
LYMPHOCYTES ABSOLUTE: 0.1 K/UL (ref 1.1–4.5)
LYMPHOCYTES RELATIVE PERCENT: 7 % (ref 20–40)
MAGNESIUM: 1.8 MG/DL (ref 1.6–2.4)
MCH RBC QN AUTO: 28 PG (ref 27–31)
MCHC RBC AUTO-ENTMCNC: 31.3 G/DL (ref 33–37)
MCV RBC AUTO: 89.6 FL (ref 80–94)
METAMYELOCYTES RELATIVE PERCENT: 2 %
MONOCYTES ABSOLUTE: 0.1 K/UL (ref 0–0.9)
MONOCYTES RELATIVE PERCENT: 7 % (ref 0–10)
NEUTROPHILS ABSOLUTE: 1.5 K/UL (ref 1.5–7.5)
NEUTROPHILS RELATIVE PERCENT: 78 % (ref 50–65)
PDW BLD-RTO: 13.5 % (ref 11.5–14.5)
PERFORMED ON: ABNORMAL
PLATELET # BLD: 124 K/UL (ref 130–400)
PLATELET SLIDE REVIEW: ABNORMAL
PMV BLD AUTO: 11.7 FL (ref 9.4–12.4)
POIKILOCYTES: ABNORMAL
POTASSIUM SERPL-SCNC: 4.8 MMOL/L (ref 3.5–5)
RBC # BLD: 3.96 M/UL (ref 4.7–6.1)
SODIUM BLD-SCNC: 133 MMOL/L (ref 136–145)
TEAR DROP CELLS: ABNORMAL
TOTAL PROTEIN: 6.4 G/DL (ref 6.6–8.7)
TROPONIN: <0.01 NG/ML (ref 0–0.03)
WBC # BLD: 1.8 K/UL (ref 4.8–10.8)

## 2021-08-14 PROCEDURE — 6370000000 HC RX 637 (ALT 250 FOR IP): Performed by: INTERNAL MEDICINE

## 2021-08-14 PROCEDURE — 2580000003 HC RX 258: Performed by: HOSPITALIST

## 2021-08-14 PROCEDURE — 93005 ELECTROCARDIOGRAM TRACING: CPT | Performed by: INTERNAL MEDICINE

## 2021-08-14 PROCEDURE — 80053 COMPREHEN METABOLIC PANEL: CPT

## 2021-08-14 PROCEDURE — 84484 ASSAY OF TROPONIN QUANT: CPT

## 2021-08-14 PROCEDURE — 6360000002 HC RX W HCPCS: Performed by: HOSPITALIST

## 2021-08-14 PROCEDURE — 6370000000 HC RX 637 (ALT 250 FOR IP): Performed by: HOSPITALIST

## 2021-08-14 PROCEDURE — 93010 ELECTROCARDIOGRAM REPORT: CPT | Performed by: INTERNAL MEDICINE

## 2021-08-14 PROCEDURE — 1210000000 HC MED SURG R&B

## 2021-08-14 PROCEDURE — 83735 ASSAY OF MAGNESIUM: CPT

## 2021-08-14 PROCEDURE — 2500000003 HC RX 250 WO HCPCS: Performed by: INTERNAL MEDICINE

## 2021-08-14 PROCEDURE — 99223 1ST HOSP IP/OBS HIGH 75: CPT | Performed by: INTERNAL MEDICINE

## 2021-08-14 PROCEDURE — 2580000003 HC RX 258: Performed by: INTERNAL MEDICINE

## 2021-08-14 PROCEDURE — 85025 COMPLETE CBC W/AUTO DIFF WBC: CPT

## 2021-08-14 PROCEDURE — 82947 ASSAY GLUCOSE BLOOD QUANT: CPT

## 2021-08-14 RX ORDER — DILTIAZEM HYDROCHLORIDE 5 MG/ML
20 INJECTION INTRAVENOUS ONCE
Status: COMPLETED | OUTPATIENT
Start: 2021-08-14 | End: 2021-08-14

## 2021-08-14 RX ORDER — DILTIAZEM HYDROCHLORIDE 100 MG/1
10 INJECTION, POWDER, LYOPHILIZED, FOR SOLUTION INTRAVENOUS ONCE
Status: DISCONTINUED | OUTPATIENT
Start: 2021-08-14 | End: 2021-08-14

## 2021-08-14 RX ADMIN — ISOSORBIDE MONONITRATE 60 MG: 60 TABLET, EXTENDED RELEASE ORAL at 08:31

## 2021-08-14 RX ADMIN — Medication 2000 UNITS: at 08:31

## 2021-08-14 RX ADMIN — Medication 5 MG: at 21:03

## 2021-08-14 RX ADMIN — SPIRONOLACTONE 50 MG: 50 TABLET ORAL at 08:30

## 2021-08-14 RX ADMIN — SOTALOL HYDROCHLORIDE 80 MG: 80 TABLET ORAL at 08:31

## 2021-08-14 RX ADMIN — HYDROCORTISONE 2.5%: 25 CREAM TOPICAL at 08:33

## 2021-08-14 RX ADMIN — HYDRALAZINE HYDROCHLORIDE 25 MG: 25 TABLET, FILM COATED ORAL at 21:02

## 2021-08-14 RX ADMIN — Medication 20 UNITS: at 22:46

## 2021-08-14 RX ADMIN — Medication 20 UNITS: at 08:30

## 2021-08-14 RX ADMIN — SOTALOL HYDROCHLORIDE 80 MG: 80 TABLET ORAL at 21:02

## 2021-08-14 RX ADMIN — PANTOPRAZOLE SODIUM 40 MG: 40 TABLET, DELAYED RELEASE ORAL at 05:15

## 2021-08-14 RX ADMIN — TAMSULOSIN HYDROCHLORIDE 0.4 MG: 0.4 CAPSULE ORAL at 08:30

## 2021-08-14 RX ADMIN — REMDESIVIR 100 MG: 100 INJECTION, POWDER, LYOPHILIZED, FOR SOLUTION INTRAVENOUS at 11:30

## 2021-08-14 RX ADMIN — LINACLOTIDE 145 MCG: 145 CAPSULE, GELATIN COATED ORAL at 05:15

## 2021-08-14 RX ADMIN — SODIUM CHLORIDE, PRESERVATIVE FREE 10 ML: 5 INJECTION INTRAVENOUS at 08:37

## 2021-08-14 RX ADMIN — TIZANIDINE 4 MG: 4 TABLET ORAL at 21:03

## 2021-08-14 RX ADMIN — ACETAMINOPHEN 650 MG: 325 TABLET ORAL at 21:03

## 2021-08-14 RX ADMIN — ENOXAPARIN SODIUM 30 MG: 30 INJECTION SUBCUTANEOUS at 08:30

## 2021-08-14 RX ADMIN — DEXAMETHASONE 6 MG: 4 TABLET ORAL at 17:22

## 2021-08-14 RX ADMIN — HYDROCORTISONE 2.5%: 25 CREAM TOPICAL at 21:00

## 2021-08-14 RX ADMIN — SODIUM CHLORIDE, PRESERVATIVE FREE 10 ML: 5 INJECTION INTRAVENOUS at 21:01

## 2021-08-14 RX ADMIN — HYDRALAZINE HYDROCHLORIDE 25 MG: 25 TABLET, FILM COATED ORAL at 05:15

## 2021-08-14 RX ADMIN — FOLIC ACID 1 MG: 1 TABLET ORAL at 08:37

## 2021-08-14 RX ADMIN — HYDRALAZINE HYDROCHLORIDE 25 MG: 25 TABLET, FILM COATED ORAL at 14:35

## 2021-08-14 RX ADMIN — DILTIAZEM HYDROCHLORIDE 20 MG: 5 INJECTION INTRAVENOUS at 18:12

## 2021-08-14 RX ADMIN — ENOXAPARIN SODIUM 30 MG: 30 INJECTION SUBCUTANEOUS at 21:02

## 2021-08-14 RX ADMIN — VERAPAMIL HYDROCHLORIDE 120 MG: 120 TABLET, FILM COATED, EXTENDED RELEASE ORAL at 08:37

## 2021-08-14 ASSESSMENT — ENCOUNTER SYMPTOMS
NAUSEA: 0
VOMITING: 0
EYES NEGATIVE: 1
DIARRHEA: 0
RESPIRATORY NEGATIVE: 1
GASTROINTESTINAL NEGATIVE: 1
SHORTNESS OF BREATH: 0

## 2021-08-14 ASSESSMENT — PAIN SCALES - GENERAL
PAINLEVEL_OUTOF10: 0
PAINLEVEL_OUTOF10: 0
PAINLEVEL_OUTOF10: 1
PAINLEVEL_OUTOF10: 0

## 2021-08-14 NOTE — PROGRESS NOTES
[facility]        [facility] Progress Note  8/14/2021 1:58 PM  Subjective:   Admit Date: 8/11/2021  PCP: Dain Spears MD    Chief Complaint: Dyspnea    Subjective: Patient seen and examined at bedside in CCU. Comfortable. Improving. Less lethargic    Cumulative Hospital History: 58-year-old obese male with a past medical history of alcoholic cirrhosis, COPD, diabetes, hypertension, atrial fibrillation on sotalol not on anticoagulation, Covid positive since 7/23/2021 presenting to the hospital for dyspnea found to be hypoxic admitted for further management. Tocilizumab given. Patient transitioned to 4th floor COVID and shortly after noted to have atrial fibrillation with RVR with rates into the 150s and associated chest pain with rapid response called and patient was transitioned back to CCU for Cardizem drip and cardiology consult. Fortunately patient converted spontaneously shortly after arrival to CCU and is remained in normal sinus rhythm. Transition back to routine medical floor for for Covid. ROS: 14 point review of systems is negative except as specifically addressed above. ADULT DIET; Regular; 4 carb choices (60 gm/meal);  Low Fat/Low Chol/High Fiber/2 gm Na    Intake/Output Summary (Last 24 hours) at 8/14/2021 1358  Last data filed at 8/14/2021 0800  Gross per 24 hour   Intake 681.67 ml   Output 1850 ml   Net -1168.33 ml     Medications:   dextrose      sodium chloride       Current Facility-Administered Medications   Medication Dose Route Frequency Provider Last Rate Last Admin    verapamil (CALAN SR) extended release tablet 120 mg  120 mg Oral Daily Delphine Cisse DO   120 mg at 08/14/21 0837    lidocaine 4 % external patch 1 patch  1 patch Transdermal Daily PRN Jacob Winkler MD   1 patch at 08/14/21 0329    insulin glargine (LANTUS) injection vial 20 Units  20 Units Subcutaneous BID China Farias MD   20 Units at 08/14/21 0830    insulin lispro (HUMALOG) injection vial 12 Units  12 Units Subcutaneous TID  Rodriguez Cedeno MD   12 Units at 08/14/21 1313    hydrocortisone (ANUSOL-HC) 2.5 % rectal cream   Rectal BID Rodriguez Cedeno MD   Given at 08/14/21 4958    linaclotide (LINZESS) capsule 145 mcg  145 mcg Oral QAM AC Simon Morris MD   145 mcg at 08/14/21 0515    insulin lispro (HUMALOG) injection vial 0-12 Units  0-12 Units Subcutaneous TID  Rodriguez Cedeno MD   4 Units at 08/14/21 1312    insulin lispro (HUMALOG) injection vial 0-6 Units  0-6 Units Subcutaneous Nightly Rodriguez Cedeno MD   1 Units at 08/13/21 2047    glucose (GLUTOSE) 40 % oral gel 15 g  15 g Oral PRN Rodriguez Cedeno MD        dextrose 50 % IV solution  12.5 g Intravenous PRN Rodriguez Cedeno MD        glucagon (rDNA) injection 1 mg  1 mg Intramuscular PRN Rodriguez Cedeno MD        dextrose 5 % solution  100 mL/hr Intravenous PRN Rodriguez Cedeno MD        remdesivir 100 mg in sodium chloride 0.9 % 250 mL IVPB  100 mg Intravenous Q24H Rodriguez Cedeno MD   Stopped at 08/14/21 1200    0.9 % sodium chloride bolus  30 mL Intravenous PRN Rodriguez Cedeno MD        isosorbide mononitrate (IMDUR) extended release tablet 60 mg  60 mg Oral Daily Rodriguez Cedeno MD   60 mg at 08/14/21 0831    hydrALAZINE (APRESOLINE) tablet 25 mg  25 mg Oral 3 times per day Rodriguez Cedeno MD   25 mg at 08/14/21 0515    iopamidol (ISOVUE-370) 76 % injection 90 mL  90 mL Intravenous ONCE PRN Hebert Riggins MD        folic acid (FOLVITE) tablet 1 mg  1 mg Oral Daily Simon Morris MD   1 mg at 08/14/21 0837    nitroGLYCERIN (NITROSTAT) SL tablet 0.4 mg  0.4 mg Sublingual Q5 Min PRN Simon Morris MD        pantoprazole (PROTONIX) tablet 40 mg  40 mg Oral QAM  Simon Morris MD   40 mg at 08/14/21 0515    sotalol (BETAPACE) tablet 80 mg  80 mg Oral BID Simon Morris MD   80 mg at 08/14/21 0831    spironolactone (ALDACTONE) tablet 50 mg  50 mg Oral Daily Simon Morris MD   50 mg at 08/14/21 0830    tamsulosin (FLOMAX) capsule 0.4 mg  0.4 mg Oral Daily Anastasia Gonzalez MD   0.4 mg at 08/14/21 0830    tiZANidine (ZANAFLEX) tablet 4 mg  4 mg Oral Q8H PRN Anastasia Gonzalez MD   4 mg at 08/13/21 0224    sodium chloride flush 0.9 % injection 5-40 mL  5-40 mL Intravenous 2 times per day Anastasia Gonzalez MD   10 mL at 08/14/21 0837    sodium chloride flush 0.9 % injection 5-40 mL  5-40 mL Intravenous PRN Anastasia Gonzalez MD        0.9 % sodium chloride infusion  25 mL Intravenous PRN Anastasia Gonzalez MD        enoxaparin (LOVENOX) injection 30 mg  30 mg Subcutaneous BID Anastasia Gonzalez MD   30 mg at 08/14/21 0830    acetaminophen (TYLENOL) tablet 650 mg  650 mg Oral Q6H PRN Anastasia Gonzalez MD   650 mg at 08/12/21 0502    Or    acetaminophen (TYLENOL) suppository 650 mg  650 mg Rectal Q6H PRN Anastasia Gonzalez MD        guaiFENesin-dextromethorphan (ROBITUSSIN DM) 100-10 MG/5ML syrup 5 mL  5 mL Oral Q4H PRN Anastasia Gonzalez MD   5 mL at 08/13/21 2766    Vitamin D (CHOLECALCIFEROL) tablet 2,000 Units  2,000 Units Oral Daily Anastasia Gonzalez MD   2,000 Units at 08/14/21 0831    dexamethasone (DECADRON) tablet 6 mg  6 mg Oral Daily Anatsasia Gonzalez MD   6 mg at 08/13/21 1811    polyethylene glycol (GLYCOLAX) packet 17 g  17 g Oral Daily PRN Anastasia Gonzalez MD        melatonin disintegrating tablet 5 mg  5 mg Oral Nightly PRN Anastasia Gonzalez MD   5 mg at 08/12/21 2333    calcium carbonate (TUMS) chewable tablet 500 mg  500 mg Oral TID PRN Anastasia Gonzalez MD        albuterol sulfate  (90 Base) MCG/ACT inhaler 2 puff  2 puff Inhalation Q4H PRN Anastasia Gonzalez MD        fluticasone-umeclidin-vilant (TRELEGY ELLIPTA) 722-93.2-92 MCG/INH inhaler 1 puff  1 puff Inhalation Daily Anastasia Gonzalez MD   1 puff at 08/13/21 2051    promethazine (PHENERGAN) injection 6.25 mg  6.25 mg Intramuscular Q6H PRN Anastasia Gonzalez MD   6.25 mg at 08/13/21 2046        Labs:     Recent Labs     08/12/21  0304 08/13/21  0415 08/14/21  0520   WBC 1.6* 1.7* 1.8*   RBC 3.45* 3.54* 3.96*   HGB 9.9* 10.0* 11.1*   HCT 31.3* 31.6* 35.5*   MCV 90.7 89.3 89.6   MCH 28.7 28.2 28.0   MCHC 31.6* 31.6* 31.3*   PLT 96* 106* 124*     Recent Labs     08/12/21  0304 08/13/21  0415 08/14/21  0520   * 135* 133*   K 4.7 4.6 4.8   ANIONGAP 10 11 10   CL 98 100 99   CO2 26 24 24   BUN 17 19 17   CREATININE 1.2 0.6 0.7   GLUCOSE 475* 320* 317*   CALCIUM 8.2* 8.7* 9.1     Recent Labs     08/13/21  0415 08/14/21  0520   MG 1.8 1.8     Recent Labs     08/11/21  1806 08/13/21  0415 08/14/21  0520   AST 27 15 21   ALT 24 18 21   BILITOT 1.0 0.5 0.6   ALKPHOS 97 73 72     ABGs:No results for input(s): PH, PO2, PCO2, HCO3, BE, O2SAT in the last 72 hours. Troponin T:   Recent Labs     08/11/21  1806 08/13/21  2100 08/14/21  0520   TROPONINI <0.01 <0.01 <0.01     INR:   Recent Labs     08/11/21 1806   INR 1.31*     Lactic Acid:   Recent Labs     08/13/21  0445   LACTA 1.0       Objective:   Vitals: BP (!) 148/52   Pulse 68   Temp 98.1 °F (36.7 °C) (Temporal)   Resp 17   Ht 5' 9\" (1.753 m)   Wt 246 lb 1.6 oz (111.6 kg)   SpO2 92%   BMI 36.34 kg/m²   24HR INTAKE/OUTPUT:      Intake/Output Summary (Last 24 hours) at 8/14/2021 1358  Last data filed at 8/14/2021 0800  Gross per 24 hour   Intake 681.67 ml   Output 1850 ml   Net -1168.33 ml     General appearance: Alert, obese  HEENT: AT/NC  Lungs: BLAE  Heart: RRR  Abdomen: BS+, obese  Extremities: pulses+  Neurologic: Alert, gross motor function intact  Skin: warm    Assessment and Plan: Active Problems:    Acute hypoxemic respiratory failure due to severe acute respiratory syndrome coronavirus 2 (SARS-CoV-2) disease ([facility]    Palliative care patient  Resolved Problems:    * No resolved hospital problems. *    Covid: Monitor saturations. Bronchodilators. Tocilizumab given. Decadron. Remdesivir day 3. Vitamin D. Atrial fibrillation: NSR after brief episode of RVR yesterday. Not on anticoagulation. Sotalol continues.  Cardiology has evaluated - consider ZIO on discharge and

## 2021-08-14 NOTE — PROGRESS NOTES
Received a call from tele that patient heart rate was 150s, SVT. Contacted Dr. Adebayo Boston and Cardizem 10mg push, EKG, and to contact cardio was ordered. Dr. Bianca Dsouza was contacted and the cardizem was modified to 20mg. After cardizem was administered patient converted back to sinus rhythm. Dr. Adebayo Boston at bedside for EKG and Cardizem administration.  Dr. Bianca Dsouza was notified and OK for patient to stay on 4th floor per Cardio and Hospitalist.     Electronically signed by Nimisha Lemon RN on 8/14/2021 at 6:28 PM

## 2021-08-14 NOTE — CONSULTS
**Physician Signature**  This document was electronically signed by: Macario Guzman MD  2021   11:05 AM    **Consult Information**  Member Facility: [address] [place] [address] MRN: [id number]  Visit/Encounter Number: [id number]  Consult ID: [id number]  Facility Time Zone: CT  Date and Time of Request: 2021 06:01 AM  CT  Requesting Clinician: DR Rubén Duffy  Patient Name: Verito Thacker  YOB: 1960  Gender: Male  Patient identity was confirmed at the beginning of the consult with the   patient/family/staff using two personal identifiers: Patient name and       **Reason for Consult**  Reason for Consult: [facility]    **Admission**  Admission Date: 2021  Chief reason for ICU admission: COVID - 19  Secondary reasons for ICU admission: AFib    **Core Metrics**  General orienting sentence for patient: : 60 yo man got 1st dose of   Matos Peter , dx COVID 19 , briefly at outside hospital, outpt decadron,   admitted , now on remdesivir, decadron. Also got Toci. Glucoses high, ?   diabetic. 2L nasal cannula. Leukopenic, plts 96K. C/o Left ankle pain with   no redness/swelling. Has hardware. Bradycardic on sotalol, apparently   chronic. came back to ICU yesterday after developing afib/RVR. Transient. Back in   sinus rhythm after < 60' on cardizem. Now off.  Currently on sotalol  Chief physiologic deterioration: None - Stable patient  Is the patient on DVT prophylaxis?: Yes  Prophylaxis type: Pharmacological  DVT Prophylaxis Comments: Lovenox  Is the patient on GI prophylaxis?: Not Indicated  Has this patient reached their nutritional goal?: Yes  Are there current issues with pain management in this patient?: Yes and   issues are being addressed  Pain management notes: Chronic back pain, no current voiced complaints  Are there issues with skin integrity?: No  Are there issues with delirium?: No  Has the patient been mobilized?: Yes  Is this patient currently intubated?: No  Are there ethical or care philosophy or family issues?: No  Do you recommend an in depth evaluation?: No  Disposition Recommendations: Downgrade/transition to ICU    **Physician Signature**  This document was electronically signed by: Sukhjinder Hays MD  08/14/2021   11:05 AM
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
## 2021-08-14 NOTE — PROGRESS NOTES
Gabriele Kebede received from CCU to room # 412 . Mental Status: Patient is oriented and alert. Vitals:    08/14/21 1645   BP: (!) 170/66   Pulse: 63   Resp: 20   Temp: 98.2 °F (36.8 °C)   SpO2: 97%     Placed on cardiac monitor: Yes. Box # [id number]. Belongings: Glasses with patient at bedside . Family at bedside No.  Oriented Patient to room. Call light within reach. Yes. Transfer was: Well tolerated by patient. .    Electronically signed by Yamileth Jacome RN on 8/14/2021 at 5:03 PM

## 2021-08-15 LAB
ALBUMIN SERPL-MCNC: 3 G/DL (ref 3.5–5.2)
ALP BLD-CCNC: 75 U/L (ref 40–130)
ALT SERPL-CCNC: 22 U/L (ref 5–41)
ANION GAP SERPL CALCULATED.3IONS-SCNC: 12 MMOL/L (ref 7–19)
AST SERPL-CCNC: 21 U/L (ref 5–40)
BASOPHILS ABSOLUTE: 0 K/UL (ref 0–0.2)
BASOPHILS RELATIVE PERCENT: 0.4 % (ref 0–1)
BILIRUB SERPL-MCNC: 0.6 MG/DL (ref 0.2–1.2)
BUN BLDV-MCNC: 27 MG/DL (ref 8–23)
CALCIUM SERPL-MCNC: 9 MG/DL (ref 8.8–10.2)
CHLORIDE BLD-SCNC: 98 MMOL/L (ref 98–111)
CO2: 24 MMOL/L (ref 22–29)
CREAT SERPL-MCNC: 1 MG/DL (ref 0.5–1.2)
EOSINOPHILS ABSOLUTE: 0 K/UL (ref 0–0.6)
EOSINOPHILS RELATIVE PERCENT: 0 % (ref 0–5)
GFR AFRICAN AMERICAN: >59
GFR NON-AFRICAN AMERICAN: >60
GLUCOSE BLD-MCNC: 254 MG/DL (ref 70–99)
GLUCOSE BLD-MCNC: 294 MG/DL (ref 70–99)
GLUCOSE BLD-MCNC: 354 MG/DL (ref 70–99)
GLUCOSE BLD-MCNC: 395 MG/DL (ref 74–109)
HCT VFR BLD CALC: 35.9 % (ref 42–52)
HEMOGLOBIN: 11.5 G/DL (ref 14–18)
IMMATURE GRANULOCYTES #: 0.1 K/UL
LYMPHOCYTES ABSOLUTE: 0.2 K/UL (ref 1.1–4.5)
LYMPHOCYTES RELATIVE PERCENT: 6.7 % (ref 20–40)
MAGNESIUM: 2.1 MG/DL (ref 1.6–2.4)
MCH RBC QN AUTO: 27.9 PG (ref 27–31)
MCHC RBC AUTO-ENTMCNC: 32 G/DL (ref 33–37)
MCV RBC AUTO: 87.1 FL (ref 80–94)
MONOCYTES ABSOLUTE: 0.3 K/UL (ref 0–0.9)
MONOCYTES RELATIVE PERCENT: 11.1 % (ref 0–10)
NEUTROPHILS ABSOLUTE: 2 K/UL (ref 1.5–7.5)
NEUTROPHILS RELATIVE PERCENT: 79.8 % (ref 50–65)
PDW BLD-RTO: 13.6 % (ref 11.5–14.5)
PERFORMED ON: ABNORMAL
PLATELET # BLD: 125 K/UL (ref 130–400)
PMV BLD AUTO: 12.1 FL (ref 9.4–12.4)
POTASSIUM SERPL-SCNC: 5.3 MMOL/L (ref 3.5–5)
RBC # BLD: 4.12 M/UL (ref 4.7–6.1)
SODIUM BLD-SCNC: 134 MMOL/L (ref 136–145)
TOTAL PROTEIN: 5.9 G/DL (ref 6.6–8.7)
WBC # BLD: 2.5 K/UL (ref 4.8–10.8)

## 2021-08-15 PROCEDURE — 6370000000 HC RX 637 (ALT 250 FOR IP): Performed by: HOSPITALIST

## 2021-08-15 PROCEDURE — 1210000000 HC MED SURG R&B

## 2021-08-15 PROCEDURE — 93005 ELECTROCARDIOGRAM TRACING: CPT | Performed by: INTERNAL MEDICINE

## 2021-08-15 PROCEDURE — 83735 ASSAY OF MAGNESIUM: CPT

## 2021-08-15 PROCEDURE — 6370000000 HC RX 637 (ALT 250 FOR IP): Performed by: INTERNAL MEDICINE

## 2021-08-15 PROCEDURE — 80053 COMPREHEN METABOLIC PANEL: CPT

## 2021-08-15 PROCEDURE — 2580000003 HC RX 258: Performed by: HOSPITALIST

## 2021-08-15 PROCEDURE — 85025 COMPLETE CBC W/AUTO DIFF WBC: CPT

## 2021-08-15 PROCEDURE — 2580000003 HC RX 258: Performed by: INTERNAL MEDICINE

## 2021-08-15 PROCEDURE — 2500000003 HC RX 250 WO HCPCS: Performed by: INTERNAL MEDICINE

## 2021-08-15 PROCEDURE — 6360000002 HC RX W HCPCS: Performed by: HOSPITALIST

## 2021-08-15 PROCEDURE — 82947 ASSAY GLUCOSE BLOOD QUANT: CPT

## 2021-08-15 RX ORDER — SODIUM POLYSTYRENE SULFONATE 15 G/60ML
15 SUSPENSION ORAL; RECTAL EVERY 6 HOURS
Status: COMPLETED | OUTPATIENT
Start: 2021-08-15 | End: 2021-08-15

## 2021-08-15 RX ORDER — DILTIAZEM HYDROCHLORIDE 5 MG/ML
20 INJECTION INTRAVENOUS ONCE
Status: COMPLETED | OUTPATIENT
Start: 2021-08-15 | End: 2021-08-15

## 2021-08-15 RX ORDER — DILTIAZEM HYDROCHLORIDE 180 MG/1
180 CAPSULE, COATED, EXTENDED RELEASE ORAL DAILY
Status: DISCONTINUED | OUTPATIENT
Start: 2021-08-15 | End: 2021-08-18 | Stop reason: HOSPADM

## 2021-08-15 RX ORDER — INSULIN GLARGINE 100 [IU]/ML
30 INJECTION, SOLUTION SUBCUTANEOUS 2 TIMES DAILY
Status: DISCONTINUED | OUTPATIENT
Start: 2021-08-15 | End: 2021-08-16

## 2021-08-15 RX ORDER — LANOLIN ALCOHOL/MO/W.PET/CERES
1 CREAM (GRAM) TOPICAL DAILY
Status: DISCONTINUED | OUTPATIENT
Start: 2021-08-15 | End: 2021-08-18 | Stop reason: HOSPADM

## 2021-08-15 RX ORDER — HYDRALAZINE HYDROCHLORIDE 50 MG/1
50 TABLET, FILM COATED ORAL EVERY 8 HOURS SCHEDULED
Status: DISCONTINUED | OUTPATIENT
Start: 2021-08-15 | End: 2021-08-18 | Stop reason: HOSPADM

## 2021-08-15 RX ADMIN — Medication 30 UNITS: at 09:28

## 2021-08-15 RX ADMIN — ENOXAPARIN SODIUM 30 MG: 30 INJECTION SUBCUTANEOUS at 21:19

## 2021-08-15 RX ADMIN — SODIUM CHLORIDE, PRESERVATIVE FREE 10 ML: 5 INJECTION INTRAVENOUS at 09:14

## 2021-08-15 RX ADMIN — SODIUM CHLORIDE, PRESERVATIVE FREE 10 ML: 5 INJECTION INTRAVENOUS at 19:50

## 2021-08-15 RX ADMIN — HYDRALAZINE HYDROCHLORIDE 25 MG: 25 TABLET, FILM COATED ORAL at 05:49

## 2021-08-15 RX ADMIN — TIZANIDINE 4 MG: 4 TABLET ORAL at 16:52

## 2021-08-15 RX ADMIN — SOTALOL HYDROCHLORIDE 80 MG: 80 TABLET ORAL at 09:10

## 2021-08-15 RX ADMIN — VERAPAMIL HYDROCHLORIDE 120 MG: 120 TABLET, FILM COATED, EXTENDED RELEASE ORAL at 09:10

## 2021-08-15 RX ADMIN — PANTOPRAZOLE SODIUM 40 MG: 40 TABLET, DELAYED RELEASE ORAL at 05:49

## 2021-08-15 RX ADMIN — LINACLOTIDE 145 MCG: 145 CAPSULE, GELATIN COATED ORAL at 05:49

## 2021-08-15 RX ADMIN — HYDROCORTISONE 2.5%: 25 CREAM TOPICAL at 21:20

## 2021-08-15 RX ADMIN — TAMSULOSIN HYDROCHLORIDE 0.4 MG: 0.4 CAPSULE ORAL at 09:11

## 2021-08-15 RX ADMIN — SODIUM POLYSTYRENE SULFONATE 15 G: 15 SUSPENSION ORAL; RECTAL at 14:50

## 2021-08-15 RX ADMIN — SODIUM CHLORIDE 30 ML: 9 INJECTION, SOLUTION INTRAVENOUS at 11:42

## 2021-08-15 RX ADMIN — REMDESIVIR 100 MG: 100 INJECTION, POWDER, LYOPHILIZED, FOR SOLUTION INTRAVENOUS at 11:42

## 2021-08-15 RX ADMIN — ISOSORBIDE MONONITRATE 60 MG: 60 TABLET, EXTENDED RELEASE ORAL at 09:10

## 2021-08-15 RX ADMIN — SPIRONOLACTONE 50 MG: 50 TABLET ORAL at 09:11

## 2021-08-15 RX ADMIN — Medication 5 MG: at 21:19

## 2021-08-15 RX ADMIN — FOLIC ACID TAB 400 MCG 1 MG: 400 TAB at 09:27

## 2021-08-15 RX ADMIN — Medication 2000 UNITS: at 09:10

## 2021-08-15 RX ADMIN — HYDRALAZINE HYDROCHLORIDE 50 MG: 50 TABLET, FILM COATED ORAL at 14:50

## 2021-08-15 RX ADMIN — DILTIAZEM HYDROCHLORIDE 180 MG: 180 CAPSULE, COATED, EXTENDED RELEASE ORAL at 19:50

## 2021-08-15 RX ADMIN — SODIUM POLYSTYRENE SULFONATE 15 G: 15 SUSPENSION ORAL; RECTAL at 09:11

## 2021-08-15 RX ADMIN — DEXAMETHASONE 6 MG: 4 TABLET ORAL at 16:46

## 2021-08-15 RX ADMIN — HYDRALAZINE HYDROCHLORIDE 50 MG: 50 TABLET, FILM COATED ORAL at 21:18

## 2021-08-15 RX ADMIN — DILTIAZEM HYDROCHLORIDE 20 MG: 5 INJECTION INTRAVENOUS at 18:16

## 2021-08-15 RX ADMIN — ENOXAPARIN SODIUM 30 MG: 30 INJECTION SUBCUTANEOUS at 09:11

## 2021-08-15 RX ADMIN — ACETAMINOPHEN 650 MG: 325 TABLET ORAL at 16:52

## 2021-08-15 RX ADMIN — SOTALOL HYDROCHLORIDE 80 MG: 80 TABLET ORAL at 21:19

## 2021-08-15 RX ADMIN — Medication 30 UNITS: at 21:21

## 2021-08-15 RX ADMIN — INSULIN HUMAN 10 UNITS: 100 INJECTION, SOLUTION PARENTERAL at 13:10

## 2021-08-15 ASSESSMENT — PAIN SCALES - GENERAL
PAINLEVEL_OUTOF10: 4
PAINLEVEL_OUTOF10: 8

## 2021-08-15 NOTE — PLAN OF CARE
Problem: Airway Clearance - Ineffective  Goal: Achieve or maintain patent airway  Outcome: Ongoing     Problem: Gas Exchange - Impaired  Goal: Absence of hypoxia  Outcome: Ongoing  Goal: Promote optimal lung function  Outcome: Ongoing     Problem: Breathing Pattern - Ineffective  Goal: Ability to achieve and maintain a regular respiratory rate  Outcome: Ongoing     Problem:  Body Temperature -  Risk of, Imbalanced  Goal: Ability to maintain a body temperature within defined limits  Outcome: Ongoing  Goal: Will regain or maintain usual level of consciousness  Outcome: Ongoing  Goal: Complications related to the disease process, condition or treatment will be avoided or minimized  Outcome: Ongoing     Problem: Isolation Precautions - Risk of Spread of Infection  Goal: Prevent transmission of infection  Outcome: Ongoing     Problem: Nutrition Deficits  Goal: Optimize nutritional status  Outcome: Ongoing     Problem: Risk for Fluid Volume Deficit  Goal: Maintain normal heart rhythm  Outcome: Ongoing  Goal: Maintain absence of muscle cramping  Outcome: Ongoing  Goal: Maintain normal serum potassium, sodium, calcium, phosphorus, and pH  Outcome: Ongoing     Problem: Loneliness or Risk for Loneliness  Goal: Demonstrate positive use of time alone when socialization is not possible  Outcome: Ongoing     Problem: Fatigue  Goal: Verbalize increase energy and improved vitality  Outcome: Ongoing     Problem: Patient Education: Go to Patient Education Activity  Goal: Patient/Family Education  Outcome: Ongoing     Problem: Falls - Risk of:  Goal: Will remain free from falls  Description: Will remain free from falls  Outcome: Ongoing  Goal: Absence of physical injury  Description: Absence of physical injury  Outcome: Ongoing     Problem: Pain:  Goal: Pain level will decrease  Description: Pain level will decrease  Outcome: Ongoing  Goal: Control of acute pain  Description: Control of acute pain  Outcome: Ongoing  Goal: Control of chronic pain  Description: Control of chronic pain  Outcome: Ongoing

## 2021-08-15 NOTE — PROGRESS NOTES
Patient running 140s svt per tele. Hospitalist notified and asked this RN to get an EKG and contact cardio. Dr. Gretel Reyna called and ordered Cardizem 20mg IV push. This order was repeated back to physician to verify. Dr. Gretel Reyna then began an order of CD, and when this nurse went to repeat back the order the physician stated he did not have time to talk and the phone call was ended. Dr. Ministerio Manley notified and placed further orders from there. EKG obtained. Patient converted back to normal sinus rhythm in the 70s after cardizem push administered. Hospitalist notified. No distress noted.    Electronically signed by Quan Landeros RN on 8/15/2021 at 6:30 PM

## 2021-08-15 NOTE — PROGRESS NOTES
[facility]        Hospitalist Progress Note  8/15/2021 11:40 AM  Subjective:   Admit Date: 8/11/2021  PCP: Rayomnd Garcia MD    Chief Complaint: Dyspnea    Subjective: Patient seen and examined at bedside. Comfortable. Feels much better. Cumulative Hospital History: 68-year-old obese male with a past medical history of alcoholic cirrhosis, COPD, diabetes, hypertension, atrial fibrillation on sotalol not on anticoagulation, Covid positive since 7/23/2021 presenting to the hospital for dyspnea found to be hypoxic admitted for further management. Tocilizumab given. Patient transitioned to 4th floor COVID and shortly after noted to have atrial fibrillation with RVR with rates into the 150s and associated chest pain with rapid response called and patient was transitioned back to CCU for Cardizem drip and cardiology consult. Fortunately patient converted spontaneously shortly after arrival to CCU and is remained in normal sinus rhythm. Transition back to routine medical floor for for Covid. ROS: 14 point review of systems is negative except as specifically addressed above. ADULT DIET; Regular; 4 carb choices (60 gm/meal);  Low Fat/Low Chol/High Fiber/2 gm Na    Intake/Output Summary (Last 24 hours) at 8/15/2021 1140  Last data filed at 8/15/2021 1120  Gross per 24 hour   Intake 240 ml   Output 975 ml   Net -735 ml     Medications:   dextrose      sodium chloride       Current Facility-Administered Medications   Medication Dose Route Frequency Provider Last Rate Last Admin    sodium polystyrene (KAYEXALATE) 15 GM/60ML suspension 15 g  15 g Oral Q6H Aldo Ford MD   15 g at 08/15/21 0911    insulin glargine (LANTUS) injection vial 30 Units  30 Units Subcutaneous BID Aldo Ford MD   30 Units at 08/15/21 0928    insulin lispro (HUMALOG) injection vial 20 Units  20 Units Subcutaneous TID WC Aldo Ford MD        hydrALAZINE (APRESOLINE) tablet 50 mg  50 mg Oral 3 times per day Aldo Ford MD        folic acid (FOLVITE) tablet 1 mg  1 mg Oral Daily Rupert Bradley MD   1 mg at 08/15/21 0927    insulin regular (HUMULIN R;NOVOLIN R) injection 10 Units  10 Units Intravenous Once Hira Valenzuela MD        verapamil [place] SR) extended release tablet 120 mg  120 mg Oral Daily Chelsea Houston DO   120 mg at 08/15/21 0910    lidocaine 4 % external patch 1 patch  1 patch Transdermal Daily PRN Rupert Bradley MD   1 patch at 08/14/21 0329    hydrocortisone (ANUSOL-HC) 2.5 % rectal cream   Rectal BID Hira Valenzuela MD   Given at 08/14/21 2100    linaclotide (LINZESS) capsule 145 mcg  145 mcg Oral QAM AC Rupert Bradley MD   145 mcg at 08/15/21 0549    insulin lispro (HUMALOG) injection vial 0-12 Units  0-12 Units Subcutaneous TID WC Hira Valenzuela MD   10 Units at 08/15/21 0927    insulin lispro (HUMALOG) injection vial 0-6 Units  0-6 Units Subcutaneous Nightly Hira Valenzuela MD   2 Units at 08/14/21 2246    glucose (GLUTOSE) 40 % oral gel 15 g  15 g Oral PRN Hira Valenzuela MD        dextrose 50 % IV solution  12.5 g Intravenous PRN Hiar Valenzuela MD        glucagon (rDNA) injection 1 mg  1 mg Intramuscular PRN Hira Valenzuela MD        dextrose 5 % solution  100 mL/hr Intravenous PRN Hira Valenzuela MD        remdesivir 100 mg in sodium chloride 0.9 % 250 mL IVPB  100 mg Intravenous Q24H Hira Valenzuela MD   Stopped at 08/14/21 1200    0.9 % sodium chloride bolus  30 mL Intravenous PRN Hira Valenzuela MD        isosorbide mononitrate (IMDUR) extended release tablet 60 mg  60 mg Oral Daily Hira Valenzuela MD   60 mg at 08/15/21 0910    iopamidol (ISOVUE-370) 76 % injection 90 mL  90 mL Intravenous ONCE PRN Carla Barragan MD        nitroGLYCERIN (NITROSTAT) SL tablet 0.4 mg  0.4 mg Sublingual Q5 Min PRN Rupert Bradley MD        pantoprazole (PROTONIX) tablet 40 mg  40 mg Oral QAM AC Rupert Bradley MD   40 mg at 08/15/21 0549    sotalol (BETAPACE) tablet 80 mg  80 mg Oral BID Rupert Bradley MD   80 mg at 08/15/21 0910    spironolactone (ALDACTONE) tablet 50 mg  50 mg Oral Daily Paulino Smith MD   50 mg at 08/15/21 0911    tamsulosin (FLOMAX) capsule 0.4 mg  0.4 mg Oral Daily Paulino Smith MD   0.4 mg at 08/15/21 0911    tiZANidine (ZANAFLEX) tablet 4 mg  4 mg Oral Q8H PRN Paulino Smith MD   4 mg at 08/14/21 2103    sodium chloride flush 0.9 % injection 5-40 mL  5-40 mL Intravenous 2 times per day Paulino Smith MD   10 mL at 08/15/21 0914    sodium chloride flush 0.9 % injection 5-40 mL  5-40 mL Intravenous PRN Paulino Smith MD        0.9 % sodium chloride infusion  25 mL Intravenous PRELEONORA Smith MD        enoxaparin (LOVENOX) injection 30 mg  30 mg Subcutaneous BID Paulino Smith MD   30 mg at 08/15/21 0911    acetaminophen (TYLENOL) tablet 650 mg  650 mg Oral Q6H PRN Paulino Smith MD   650 mg at 08/14/21 2103    Or    acetaminophen (TYLENOL) suppository 650 mg  650 mg Rectal Q6H PRN Paulino Smith MD        guaiFENesin-dextromethorphan (ROBITUSSIN DM) 100-10 MG/5ML syrup 5 mL  5 mL Oral Q4H PRN Paulino Smith MD   5 mL at 08/13/21 6652    Vitamin D (CHOLECALCIFEROL) tablet 2,000 Units  2,000 Units Oral Daily Paulino Smith MD   2,000 Units at 08/15/21 0910    dexamethasone (DECADRON) tablet 6 mg  6 mg Oral Daily Paulino Smith MD   6 mg at 08/14/21 1722    polyethylene glycol (GLYCOLAX) packet 17 g  17 g Oral Daily PRN Paulino Smith MD        melatonin disintegrating tablet 5 mg  5 mg Oral Nightly PRN Pauilno Smith MD   5 mg at 08/14/21 2103    calcium carbonate (TUMS) chewable tablet 500 mg  500 mg Oral TID PRN Paulino Smith MD        albuterol sulfate  (90 Base) MCG/ACT inhaler 2 puff  2 puff Inhalation Q4H PRN Paulino Smith MD        fluticasone-umeclidin-vilant (TRELEGY ELLIPTA) 175-74.2-44 MCG/INH inhaler 1 puff  1 puff Inhalation Daily Paulino Smith MD   1 puff at 08/13/21 2051    promethazine (PHENERGAN) injection 6.25 mg  6.25 mg Intramuscular Q6H PRN Paulino Smith, MD   6.25 mg at 08/13/21 2046        Labs:     Recent Labs     08/13/21 0415 08/14/21  0520 08/15/21  0327   WBC 1.7* 1.8* 2.5*   RBC 3.54* 3.96* 4.12*   HGB 10.0* 11.1* 11.5*   HCT 31.6* 35.5* 35.9*   MCV 89.3 89.6 87.1   MCH 28.2 28.0 27.9   MCHC 31.6* 31.3* 32.0*   * 124* 125*     Recent Labs     08/13/21  0415 08/14/21  0520 08/15/21  0327   * 133* 134*   K 4.6 4.8 5.3*   ANIONGAP 11 10 12    99 98   CO2 24 24 24   BUN 19 17 27*   CREATININE 0.6 0.7 1.0   GLUCOSE 320* 317* 395*   CALCIUM 8.7* 9.1 9.0     Recent Labs     08/13/21 0415 08/14/21  0520 08/15/21  0327   MG 1.8 1.8 2.1     Recent Labs     08/13/21  0415 08/14/21  0520 08/15/21  0327   AST 15 21 21   ALT 18 21 22   BILITOT 0.5 0.6 0.6   ALKPHOS 73 72 75     ABGs:No results for input(s): PH, PO2, PCO2, HCO3, BE, O2SAT in the last 72 hours. Troponin T:   Recent Labs     08/13/21  2100 08/14/21 0520   TROPONINI <0.01 <0.01     INR:   No results for input(s): INR in the last 72 hours. Lactic Acid:   Recent Labs     08/13/21  0445   LACTA 1.0       Objective:   Vitals: /63   Pulse 63   Temp 96.7 °F (35.9 °C) (Temporal)   Resp 20   Ht 5' 9\" (1.753 m)   Wt 246 lb 1.6 oz (111.6 kg)   SpO2 97%   BMI 36.34 kg/m²   24HR INTAKE/OUTPUT:      Intake/Output Summary (Last 24 hours) at 8/15/2021 1140  Last data filed at 8/15/2021 1120  Gross per 24 hour   Intake 240 ml   Output 975 ml   Net -735 ml     General appearance: Alert, obese  HEENT: AT/NC  Lungs: BLAE  Heart: RRR  Abdomen: BS+, obese  Extremities: pulses+  Neurologic: Alert, gross motor function intact  Skin: warm    Assessment and Plan: Active Problems:    Acute hypoxemic respiratory failure due to severe acute respiratory syndrome coronavirus 2 (SARS-CoV-2) disease ([facility] [address]    Palliative care patient  Resolved Problems:    * No resolved hospital problems. *    Covid: Monitor saturations. Bronchodilators. Tocilizumab given. Decadron. Remdesivir day 4.   Vitamin D.    Atrial fibrillation: NSR. Not on anticoagulation. Sotalol continues. Cardiology has evaluated - consider ZIO on discharge and long-term AC if has recurrent afib. COPD: Bronchodilators. Monitor O2 saturations. Cirrhosis/pancytopenia: PPI. Monitor I's and O's. Former alcoholic. Monitor CBC. DM: Monitor BG and adjust medication as needed. Uncontrolled. Insulin increased. HbA1c. Hypertension: Monitor BP and adjust medication as needed. Supportive management. PT/OT as tolerated.     Advance Directive: Full Code    DVT prophylaxis: Lovenox    Discharge planning: TBD - possibly home tomorrow      Signed:  Sita Powell MD 8/15/2021 11:40 AM  Rounding Hospitalist

## 2021-08-15 NOTE — PLAN OF CARE
Problem: Airway Clearance - Ineffective  Goal: Achieve or maintain patent airway  8/15/2021 1535 by Misael Eckert RN  Outcome: Ongoing  8/15/2021 0340 by Jhonatan Greco RN  Outcome: Ongoing     Problem: Gas Exchange - Impaired  Goal: Absence of hypoxia  8/15/2021 1535 by Misael Eckert RN  Outcome: Ongoing  8/15/2021 0340 by Jhonatan Greco RN  Outcome: Ongoing  Goal: Promote optimal lung function  8/15/2021 1535 by Misael Eckert RN  Outcome: Ongoing  8/15/2021 0340 by Jhonatan Greco RN  Outcome: Ongoing     Problem: Breathing Pattern - Ineffective  Goal: Ability to achieve and maintain a regular respiratory rate  8/15/2021 1535 by Misael Eckert RN  Outcome: Ongoing  8/15/2021 0340 by Jhonatan Greco RN  Outcome: Ongoing     Problem:  Body Temperature -  Risk of, Imbalanced  Goal: Ability to maintain a body temperature within defined limits  8/15/2021 1535 by Misael Eckert RN  Outcome: Ongoing  8/15/2021 0340 by Jhonatan Greco RN  Outcome: Ongoing  Goal: Will regain or maintain usual level of consciousness  8/15/2021 1535 by Misael Eckert RN  Outcome: Ongoing  8/15/2021 0340 by Jhonatan Greco RN  Outcome: Ongoing  Goal: Complications related to the disease process, condition or treatment will be avoided or minimized  8/15/2021 1535 by Misael Eckert RN  Outcome: Ongoing  8/15/2021 0340 by Jhonatan Greco RN  Outcome: Ongoing     Problem: Isolation Precautions - Risk of Spread of Infection  Goal: Prevent transmission of infection  8/15/2021 1535 by Misael Eckert RN  Outcome: Ongoing  8/15/2021 0340 by Jhonatan Greco RN  Outcome: Ongoing     Problem: Nutrition Deficits  Goal: Optimize nutritional status  8/15/2021 1535 by Misael Eckert RN  Outcome: Ongoing  8/15/2021 0340 by Jhonatan Greco RN  Outcome: Ongoing     Problem: Risk for Fluid Volume Deficit  Goal: Maintain normal heart rhythm  8/15/2021 1535 by Misael Eckert RN  Outcome: Ongoing  8/15/2021 0340 by Jhonatan Greco RN  Outcome: Ongoing  Goal: Maintain absence of muscle cramping  8/15/2021 1535 by Chas Bobby RN  Outcome: Ongoing  8/15/2021 0340 by Yuriy Powell RN  Outcome: Ongoing  Goal: Maintain normal serum potassium, sodium, calcium, phosphorus, and pH  8/15/2021 1535 by Chas Bobby RN  Outcome: Ongoing  8/15/2021 0340 by Yuriy Powell RN  Outcome: Ongoing     Problem: Loneliness or Risk for Loneliness  Goal: Demonstrate positive use of time alone when socialization is not possible  8/15/2021 1535 by Chas Bobby RN  Outcome: Ongoing  8/15/2021 0340 by Yuriy Powell RN  Outcome: Ongoing     Problem: Fatigue  Goal: Verbalize increase energy and improved vitality  8/15/2021 1535 by Chas Bobby RN  Outcome: Ongoing  8/15/2021 0340 by Yuriy Powell RN  Outcome: Ongoing     Problem: Patient Education: Go to Patient Education Activity  Goal: Patient/Family Education  8/15/2021 1535 by Chas Bobby RN  Outcome: Ongoing  8/15/2021 0340 by Yuriy Powell RN  Outcome: Ongoing     Problem: Falls - Risk of:  Goal: Will remain free from falls  Description: Will remain free from falls  8/15/2021 1535 by Chas Bobby RN  Outcome: Ongoing  8/15/2021 0340 by Yuriy Powell RN  Outcome: Ongoing  Goal: Absence of physical injury  Description: Absence of physical injury  8/15/2021 1535 by Chas Bobby RN  Outcome: Ongoing  8/15/2021 0340 by Yuriy Powell RN  Outcome: Ongoing     Problem: Pain:  Goal: Pain level will decrease  Description: Pain level will decrease  8/15/2021 1535 by Chas Bobby RN  Outcome: Ongoing  8/15/2021 0340 by Yuriy Powell RN  Outcome: Ongoing  Goal: Control of acute pain  Description: Control of acute pain  8/15/2021 1535 by Chas Bobby RN  Outcome: Ongoing  8/15/2021 0340 by Yuriy Powell RN  Outcome: Ongoing  Goal: Control of chronic pain  Description: Control of chronic pain  8/15/2021 1535 by Chas Bobby RN  Outcome: Ongoing  8/15/2021 0340 by Yuriy Powell RN  Outcome: Ongoing

## 2021-08-16 LAB
ALBUMIN SERPL-MCNC: 2.9 G/DL (ref 3.5–5.2)
ALP BLD-CCNC: 67 U/L (ref 40–130)
ALT SERPL-CCNC: 22 U/L (ref 5–41)
ANION GAP SERPL CALCULATED.3IONS-SCNC: 9 MMOL/L (ref 7–19)
AST SERPL-CCNC: 17 U/L (ref 5–40)
BASOPHILS ABSOLUTE: 0 K/UL (ref 0–0.2)
BASOPHILS RELATIVE PERCENT: 0.3 % (ref 0–1)
BILIRUB SERPL-MCNC: 0.6 MG/DL (ref 0.2–1.2)
BLOOD CULTURE, ROUTINE: NORMAL
BUN BLDV-MCNC: 27 MG/DL (ref 8–23)
CALCIUM SERPL-MCNC: 8.6 MG/DL (ref 8.8–10.2)
CHLORIDE BLD-SCNC: 95 MMOL/L (ref 98–111)
CO2: 27 MMOL/L (ref 22–29)
CREAT SERPL-MCNC: 0.8 MG/DL (ref 0.5–1.2)
CULTURE, BLOOD 2: NORMAL
EKG P AXIS: 34 DEGREES
EKG P AXIS: 38 DEGREES
EKG P-R INTERVAL: 152 MS
EKG P-R INTERVAL: 168 MS
EKG Q-T INTERVAL: 328 MS
EKG Q-T INTERVAL: 362 MS
EKG QRS DURATION: 92 MS
EKG QRS DURATION: 94 MS
EKG QTC CALCULATION (BAZETT): 407 MS
EKG QTC CALCULATION (BAZETT): 464 MS
EKG T AXIS: 37 DEGREES
EKG T AXIS: 52 DEGREES
EOSINOPHILS ABSOLUTE: 0 K/UL (ref 0–0.6)
EOSINOPHILS RELATIVE PERCENT: 0 % (ref 0–5)
GFR AFRICAN AMERICAN: >59
GFR NON-AFRICAN AMERICAN: >60
GLUCOSE BLD-MCNC: 151 MG/DL (ref 70–99)
GLUCOSE BLD-MCNC: 161 MG/DL (ref 70–99)
GLUCOSE BLD-MCNC: 320 MG/DL (ref 70–99)
GLUCOSE BLD-MCNC: 332 MG/DL (ref 74–109)
GLUCOSE BLD-MCNC: 360 MG/DL (ref 70–99)
HBA1C MFR BLD: 8.2 % (ref 4–6)
HCT VFR BLD CALC: 34.4 % (ref 42–52)
HEMOGLOBIN: 11.1 G/DL (ref 14–18)
IMMATURE GRANULOCYTES #: 0.1 K/UL
LYMPHOCYTES ABSOLUTE: 0.2 K/UL (ref 1.1–4.5)
LYMPHOCYTES RELATIVE PERCENT: 5.1 % (ref 20–40)
MAGNESIUM: 2 MG/DL (ref 1.6–2.4)
MCH RBC QN AUTO: 27.6 PG (ref 27–31)
MCHC RBC AUTO-ENTMCNC: 32.3 G/DL (ref 33–37)
MCV RBC AUTO: 85.6 FL (ref 80–94)
MONOCYTES ABSOLUTE: 0.3 K/UL (ref 0–0.9)
MONOCYTES RELATIVE PERCENT: 9.4 % (ref 0–10)
NEUTROPHILS ABSOLUTE: 2.9 K/UL (ref 1.5–7.5)
NEUTROPHILS RELATIVE PERCENT: 82.9 % (ref 50–65)
PDW BLD-RTO: 13.4 % (ref 11.5–14.5)
PERFORMED ON: ABNORMAL
PLATELET # BLD: 109 K/UL (ref 130–400)
PMV BLD AUTO: 12 FL (ref 9.4–12.4)
POTASSIUM SERPL-SCNC: 4.6 MMOL/L (ref 3.5–5)
RBC # BLD: 4.02 M/UL (ref 4.7–6.1)
SODIUM BLD-SCNC: 131 MMOL/L (ref 136–145)
TOTAL PROTEIN: 5.7 G/DL (ref 6.6–8.7)
WBC # BLD: 3.5 K/UL (ref 4.8–10.8)

## 2021-08-16 PROCEDURE — 1210000000 HC MED SURG R&B

## 2021-08-16 PROCEDURE — 83735 ASSAY OF MAGNESIUM: CPT

## 2021-08-16 PROCEDURE — 2500000003 HC RX 250 WO HCPCS: Performed by: INTERNAL MEDICINE

## 2021-08-16 PROCEDURE — 93010 ELECTROCARDIOGRAM REPORT: CPT | Performed by: INTERNAL MEDICINE

## 2021-08-16 PROCEDURE — 83036 HEMOGLOBIN GLYCOSYLATED A1C: CPT

## 2021-08-16 PROCEDURE — 80053 COMPREHEN METABOLIC PANEL: CPT

## 2021-08-16 PROCEDURE — 82947 ASSAY GLUCOSE BLOOD QUANT: CPT

## 2021-08-16 PROCEDURE — 99232 SBSQ HOSP IP/OBS MODERATE 35: CPT | Performed by: INTERNAL MEDICINE

## 2021-08-16 PROCEDURE — 6360000002 HC RX W HCPCS: Performed by: HOSPITALIST

## 2021-08-16 PROCEDURE — 2580000003 HC RX 258: Performed by: INTERNAL MEDICINE

## 2021-08-16 PROCEDURE — 6370000000 HC RX 637 (ALT 250 FOR IP): Performed by: HOSPITALIST

## 2021-08-16 PROCEDURE — 2580000003 HC RX 258: Performed by: HOSPITALIST

## 2021-08-16 PROCEDURE — 6370000000 HC RX 637 (ALT 250 FOR IP): Performed by: INTERNAL MEDICINE

## 2021-08-16 PROCEDURE — 36415 COLL VENOUS BLD VENIPUNCTURE: CPT

## 2021-08-16 PROCEDURE — 85025 COMPLETE CBC W/AUTO DIFF WBC: CPT

## 2021-08-16 RX ORDER — INSULIN GLARGINE 100 [IU]/ML
40 INJECTION, SOLUTION SUBCUTANEOUS 2 TIMES DAILY
Status: DISCONTINUED | OUTPATIENT
Start: 2021-08-16 | End: 2021-08-18 | Stop reason: HOSPADM

## 2021-08-16 RX ADMIN — INSULIN GLARGINE 40 UNITS: 100 INJECTION, SOLUTION SUBCUTANEOUS at 21:08

## 2021-08-16 RX ADMIN — ACETAMINOPHEN 650 MG: 325 TABLET ORAL at 18:44

## 2021-08-16 RX ADMIN — SOTALOL HYDROCHLORIDE 80 MG: 80 TABLET ORAL at 09:19

## 2021-08-16 RX ADMIN — HYDRALAZINE HYDROCHLORIDE 50 MG: 50 TABLET, FILM COATED ORAL at 22:10

## 2021-08-16 RX ADMIN — DILTIAZEM HYDROCHLORIDE 180 MG: 180 CAPSULE, COATED, EXTENDED RELEASE ORAL at 09:20

## 2021-08-16 RX ADMIN — REMDESIVIR 100 MG: 100 INJECTION, POWDER, LYOPHILIZED, FOR SOLUTION INTRAVENOUS at 09:20

## 2021-08-16 RX ADMIN — ENOXAPARIN SODIUM 30 MG: 30 INJECTION SUBCUTANEOUS at 09:20

## 2021-08-16 RX ADMIN — SODIUM CHLORIDE, PRESERVATIVE FREE 10 ML: 5 INJECTION INTRAVENOUS at 09:21

## 2021-08-16 RX ADMIN — HYDRALAZINE HYDROCHLORIDE 50 MG: 50 TABLET, FILM COATED ORAL at 05:37

## 2021-08-16 RX ADMIN — HYDRALAZINE HYDROCHLORIDE 50 MG: 50 TABLET, FILM COATED ORAL at 14:56

## 2021-08-16 RX ADMIN — SODIUM CHLORIDE, PRESERVATIVE FREE 10 ML: 5 INJECTION INTRAVENOUS at 21:06

## 2021-08-16 RX ADMIN — INSULIN HUMAN 15 UNITS: 100 INJECTION, SOLUTION PARENTERAL at 12:59

## 2021-08-16 RX ADMIN — TIZANIDINE 4 MG: 4 TABLET ORAL at 12:16

## 2021-08-16 RX ADMIN — SPIRONOLACTONE 50 MG: 50 TABLET ORAL at 09:20

## 2021-08-16 RX ADMIN — LINACLOTIDE 145 MCG: 145 CAPSULE, GELATIN COATED ORAL at 05:37

## 2021-08-16 RX ADMIN — TAMSULOSIN HYDROCHLORIDE 0.4 MG: 0.4 CAPSULE ORAL at 09:20

## 2021-08-16 RX ADMIN — FOLIC ACID TAB 400 MCG 1 MG: 400 TAB at 09:19

## 2021-08-16 RX ADMIN — PANTOPRAZOLE SODIUM 40 MG: 40 TABLET, DELAYED RELEASE ORAL at 05:37

## 2021-08-16 RX ADMIN — ENOXAPARIN SODIUM 30 MG: 30 INJECTION SUBCUTANEOUS at 21:06

## 2021-08-16 RX ADMIN — HYDROCORTISONE 2.5%: 25 CREAM TOPICAL at 22:12

## 2021-08-16 RX ADMIN — Medication 2000 UNITS: at 09:19

## 2021-08-16 RX ADMIN — PROMETHAZINE HYDROCHLORIDE 6.25 MG: 25 INJECTION INTRAMUSCULAR; INTRAVENOUS at 18:44

## 2021-08-16 RX ADMIN — ACETAMINOPHEN 650 MG: 325 TABLET ORAL at 12:16

## 2021-08-16 RX ADMIN — DEXAMETHASONE 6 MG: 4 TABLET ORAL at 18:35

## 2021-08-16 RX ADMIN — Medication 30 UNITS: at 09:21

## 2021-08-16 RX ADMIN — ISOSORBIDE MONONITRATE 60 MG: 60 TABLET, EXTENDED RELEASE ORAL at 09:19

## 2021-08-16 RX ADMIN — SOTALOL HYDROCHLORIDE 80 MG: 80 TABLET ORAL at 21:06

## 2021-08-16 ASSESSMENT — PAIN SCALES - GENERAL
PAINLEVEL_OUTOF10: 5
PAINLEVEL_OUTOF10: 5

## 2021-08-16 NOTE — PROGRESS NOTES
Cardiology Daily Note Beau Andrews MD      Patient:  You Party  [id number]    Patient Active Problem List    Diagnosis Date Noted    Palliative care patient 08/12/2021    Acute hypoxemic respiratory failure due to severe acute respiratory syndrome coronavirus 2 (SARS-CoV-2) disease ([facility] 08/11/2021    Obstructive sleep apnea     Grade I diastolic dysfunction 98/91/4238    Facet hypertrophy of lumbar region 10/28/2020    Chronic bilateral low back pain without sciatica 10/28/2020    Myofascial muscle pain 10/28/2020    Muscle spasm of back 10/28/2020    Chronic prescription opiate use 10/23/2020    Iron deficiency 05/18/2020    Chronic back pain     COPD (chronic obstructive pulmonary disease) ([facility]     GERD (gastroesophageal reflux disease)     Liver disease        Admit Date:  8/11/2021    Admission Problem List: Present on Admission:   Acute hypoxemic respiratory failure due to severe acute respiratory syndrome coronavirus 2 (SARS-CoV-2) disease ([facility]   Palliative care patient      Cardiac Specific Data:  Specialty Problems     None          Subjective:  Mr. Matt Hewitt seen today resting comfortably denies chest pain dyspnea stable. Blood pressure 138/60 HR 63. The patient has had 2 episodes last few nights of rapid supraventricular tachycardia in each case corrected with a single bolus of intravenous diltiazem. On oral diltiazem started. On sotalol at home. No other issues reported. Objective:   /66   Pulse 63   Temp 97.5 °F (36.4 °C) (Temporal)   Resp 16   Ht 5' 9\" (1.753 m)   Wt 243 lb 3 oz (110.3 kg)   SpO2 95%   BMI 35.91 kg/m²       Intake/Output Summary (Last 24 hours) at 8/16/2021 1002  Last data filed at 8/16/2021 0946  Gross per 24 hour   Intake 980 ml   Output 1675 ml   Net -695 ml       Prior to Admission medications    Medication Sig Start Date End Date Taking?  Authorizing Provider   predniSONE (DELTASONE) 20 MG tablet Take 20 mg by mouth daily 3 tabs for e days,  2 tabs for 3 days 1 tab for 3 days   Yes Historical Provider, MD   Roflumilast (DALIRESP) 500 MCG tablet Take 500 mcg by mouth daily   Yes Historical Provider, MD   verapamil (CALAN SR) 120 MG extended release tablet TAKE 1 TABLET EVERY NIGHT 8/9/21  Yes Paul Escalona MD   butalbital-acetaminophen-caffeine (FIORICET, ESGIC) -61 MG per tablet Take 1 tablet by mouth every 6 hours as needed for Headaches 8/9/21  Yes Paul Escalona MD   tamsulosin [facility]) 0.4 MG capsule Take 1 capsule by mouth daily 7/19/21  Yes Paul Escalona MD   diclofenac sodium (VOLTAREN) 1 % GEL Apply 4 g topically 2 times daily 6/8/21  Yes Paul Escalona MD   ondansetron (ZOFRAN-ODT) 4 MG disintegrating tablet TAKE ONE TABLET BY MOUTH EVERY FOUR HOURS AS NEEDED FOR NAUSEA OR VOMITING 6/2/21  Yes Paul Escalona MD   verapamil (VERELAN) 120 MG extended release capsule Take 1 capsule by mouth nightly 4/29/21  Yes Paul Escalona MD   tiZANidine (ZANAFLEX) 4 MG tablet TAKE 1 TO 2 TABLETS BY MOUTH EVERY 8 HOURS AS NEEDED 4/1/21  Yes Paul Escalona MD   omeprazole (PRILOSEC) 40 MG delayed release capsule Take 1 capsule by mouth daily 3/24/21  Yes Paul Escalona MD   spironolactone (ALDACTONE) 50 MG tablet Take 1 tablet by mouth daily 3/16/21  Yes Paul Escalona MD   isosorbide mononitrate (IMDUR) 30 MG extended release tablet Take 1 tablet by mouth daily 3/16/21  Yes Paul Escalona MD   sotalol (BETAPACE) 80 MG tablet Take 1 tablet by mouth 2 times daily 3/16/21  Yes Paul Escalona MD   celecoxib (CELEBREX) 200 MG capsule Take 1 capsule by mouth daily 3/16/21  Yes Paul Escalona MD   furosemide (LASIX) 20 MG tablet Take 1 tablet by mouth daily 2/23/21  Yes Paul Escalona MD   folic acid (FOLVITE) 1 MG tablet Take 1 tablet by mouth daily 2/18/21  Yes Paul Escalona MD   albuterol sulfate (PROAIR RESPICLICK) 386 (90 Base) MCG/ACT aerosol powder inhalation Inhale 2 puffs into the lungs every 4 hours as needed for Wheezing or Shortness of Breath   Yes Historical Edema: none       Lab Data:  CBC:   Recent Labs     08/14/21  0520 08/15/21  0327 08/16/21  0348   WBC 1.8* 2.5* 3.5*   HGB 11.1* 11.5* 11.1*   HCT 35.5* 35.9* 34.4*   MCV 89.6 87.1 85.6   * 125* 109*     BMP:   Recent Labs     08/14/21  0520 08/15/21  0327 08/16/21  0348   * 134* 131*   K 4.8 5.3* 4.6   CL 99 98 95*   CO2 24 24 27   BUN 17 27* 27*   CREATININE 0.7 1.0 0.8     LIVER PROFILE:   Recent Labs     08/14/21  0520 08/15/21  0327 08/16/21  0348   AST 21 21 17   ALT 21 22 22   BILITOT 0.6 0.6 0.6   ALKPHOS 72 75 67     PT/INR: No results for input(s): PROTIME, INR in the last 72 hours. APTT: No results for input(s): APTT in the last 72 hours. BNP:  No results for input(s): BNP in the last 72 hours. CK, CKMB, Troponin: @LABRCNT (CKTOTAL:3, CKMB:3, TROPONINI:3)@    IMAGING:  CT ABDOMEN PELVIS W IV CONTRAST Additional Contrast? None    Result Date: 8/11/2021  CT ABDOMEN PELVIS W IV CONTRAST 8/11/2021 6:40 PM HISTORY: Pain with abdominal distention and vomiting COMPARISON: 4/7/2021. DLP: 1480 mGy cm. All CT scans are performed using dose optimization techniques as appropriate to the performed exam and include at least one of the following: Automated exposure control, adjustment of the mA and/or kV according to size, and the use of the iterative reconstruction technique. TECHNIQUE: Following the intravenous administration of contrast, helical CT tomographic images of the abdomen and pelvis were acquired. Coronal reformatted images were also provided for review. The study is degraded by motion artifact. FINDINGS: There is a trace right-sided pleural effusion blunting the costophrenic angle with right basilar atelectasis or scarring. Lung bases are otherwise clear. The base of the heart is unremarkable. Bula Dayhoff LIVER: Liver is diminished in caliber with scalloped contour suggesting underlying cirrhotic change. No evidence of focal hepatic mass. There is normal enhancement of the hepatic vasculature. Isamar Narayanan BILIARY SYSTEM: The gallbladder is unremarkable. No intrahepatic or extrahepatic ductal dilatation. PANCREAS: No focal pancreatic lesion. SPLEEN: There is splenomegaly with the spleen measuring 18.7 cm in svmn-bt-egab length. The spleen is homogeneous in density without evidence of focal mass. Clenton Reas KIDNEYS AND ADRENALS: Bilateral kidneys and adrenal glands are unremarkable. The ureters are decompressed and normal in appearance. RETROPERITONEUM: There is heavy atheromatous calcification of the abdominal aorta as well as milder disease in the iliac arteries. There is no evidence of aneurysm. No evidence of retroperitoneal hematoma or lymphadenopathy. Clenton Reas GI TRACT: There is a nonspecific bowel gas pattern with some fluid-filled bowel loops with scattered air-fluid levels. The colon is normal in distribution and appearance. There is no evidence of mechanical bowel obstruction or focal bowel wall thickening. . The appendix is visualized and unremarkable. OTHER: There is no mesenteric mass, lymphadenopathy or fluid collection. The abdominopelvic vasculature is patent. There is a small amount of free fluid within the pelvis suggesting mild ascites. This may be related to the patient's suspected hepatocellular disease. No suspicious bony abnormalities are demonstrated. . Small bilateral fat-containing inguinal hernias are present. PELVIS: There is no free fluid or mass within the pelvis. . The urinary bladder wall is prominent but the urinary bladder is nondistended. There is no free fluid in the pelvis. .    1. The liver is cirrhotic in appearance with mild heterogeneity and diminished caliber with scalloped contours. No discrete hepatic mass is identified. There is splenomegaly. A small amount of free fluid is noted within the lower abdomen and pelvis suggesting mild ascites. Portal hypertension should be considered. 2. There is nonspecific bowel gas pattern with some fluid-filled bowel loops with scattered air-fluid levels.  This may represent a mild enteritis. The colon is normal in distribution and appearance. No evidence of mechanical obstruction. The appendix is identified and is normal in appearance. 3. Small bilateral fat-containing inguinal hernias. . 4. Trace right-sided pleural effusion with right basilar atelectasis or scarring. 5. Prominence of the urinary bladder wall. This may be related to incomplete distention. Signed by Dr Olena Gresham    Result Date: 8/11/2021  EXAMINATION: Chest one view 11/20/2021 HISTORY: Shortness of air. FINDINGS: Today's exam is compared to previous study of 10/23/2020. There is mild scarring within the lung bases. Lungs are otherwise clear. There is no evidence of consolidative pneumonia or effusion. No evidence of pneumothorax. . Mild scarring in the lung bases. No acute disease. Signed by Dr Aj Escobar    Result Date: 8/11/2021  CTA PULMONARY W CONTRAST 8/11/2021 6:39 PM HISTORY: Worsening shortness of air. Chest pain and hypoxia COMPARISON: None. DLP: 779 mGy cm. All CT scans are performed using dose optimization techniques as appropriate to the performed exam and include at least one of the following: Automated exposure control, adjustment of the mA and/or kV according to size, and the use of the iterative reconstruction technique. TECHNIQUE: Helical tomographic images of the chest were obtained after the administration of intravenous contrast following angiogram protocol. Additionally, 3D MIP reconstructions in the coronal and sagittal planes were provided. FINDINGS:  Pulmonary arteries: There is adequate enhancement of the pulmonary arteries to evaluate for central and segmental pulmonary emboli. There are no filling defects within the main, lobar, segmental or visualized subsegmental pulmonary arteries. The pulmonary vessels are within normal limits. Aorta and great vessels:  The aorta is well opacified and demonstrates no aneurysm, stenosis or dissection. The great vessels are normal in appearance. Moderate coronary calcifications are present. . Neck base: The imaged portion of the base of the neck appears unremarkable. Lungs: There is mild scarring or atelectasis within the lingular segment left upper lobe and right middle lobe as well as the right posterior lung base. No evidence of acute consolidative pneumonia or effusion. . The trachea and bronchial tree are patent. Heart: The heart is normal in size. There is no pericardial effusion. Lymph nodes: No pathologically enlarged mediastinal, hilar, or axillary lymph nodes are present. Bones and soft tissues: The osseous structures of the thorax and surrounding soft tissues demonstrate no acute process. Upper abdomen: The liver is scalloped in contour and diminished in caliber suggesting there may be underlying cirrhotic change. The spleen is enlarged suggesting there could also be portal hypertension. The spleen is not imaged in its entirety. .     1. No evidence of pulmonary thromboembolic disease. No evidence of acute consolidative pneumonia. 2. There is some scarring within the right lung base as well as within the right middle lobe and lingular segment of the left upper lobe. No effusion is present. 3. Coronary calcifications are present. No evidence of cardiomegaly or pericardial effusion. 4. The liver is cirrhotic in appearance with scalloped contours and diminished caliber but not imaged in its entirety. There also is splenomegaly raising the possibility of portal hypertension. . Signed by Dr Jm Carvajal:  1. Complaints of shortness of breath progressive the last 2 weeks  2. Covid-19 status positive  3. Paroxysmal atrial fibrillation episode yesterday now converted sinus rhythm  4. COPD  5. Cirrhosis  6. Gastroesophageal reflux disease  7. Tobacco abuse discontinued 2002 80 pack years  8. Cardiac catheterization 10/23/2020 normal coronary arteries  9.  Acute hypoxemic respiratory failure  10. Chronic back pain  11. Iron deficiency  12. Chronic prescription opiate use  13. Obstructive sleep apnea  14. Echocardiogram 10/23/2020 mild TR normal LV function grade 1 diastolic dysfunction  15. Chronic antiarrhythmic therapy with sotalol at home    Plan:  1. Continue current medical therapy Cardizem added  2. Monitor another 24  3.  Suggest cardiac event monitor upon discharge    Tamara Tipton MD, MD 8/16/2021 10:02 AM

## 2021-08-16 NOTE — PLAN OF CARE
Problem: Airway Clearance - Ineffective  Goal: Achieve or maintain patent airway  8/16/2021 0319 by Jeniffer Fang RN  Outcome: Ongoing  8/15/2021 1535 by Timi Leon RN  Outcome: Ongoing     Problem: Gas Exchange - Impaired  Goal: Absence of hypoxia  8/16/2021 0319 by Jeniffer Fang RN  Outcome: Ongoing  8/15/2021 1535 by Timi Leon RN  Outcome: Ongoing  Goal: Promote optimal lung function  8/16/2021 0319 by Jeniffer Fang RN  Outcome: Ongoing  8/15/2021 1535 by Timi Leon RN  Outcome: Ongoing     Problem: Breathing Pattern - Ineffective  Goal: Ability to achieve and maintain a regular respiratory rate  8/16/2021 0319 by Jeniffer Fang RN  Outcome: Ongoing  8/15/2021 1535 by Timi Leon RN  Outcome: Ongoing     Problem:  Body Temperature -  Risk of, Imbalanced  Goal: Ability to maintain a body temperature within defined limits  8/16/2021 0319 by Jeniffer Fang RN  Outcome: Ongoing  8/15/2021 1535 by Timi Leon RN  Outcome: Ongoing  Goal: Will regain or maintain usual level of consciousness  8/16/2021 0319 by Jeniffer Fang RN  Outcome: Ongoing  8/15/2021 1535 by Timi Leon RN  Outcome: Ongoing  Goal: Complications related to the disease process, condition or treatment will be avoided or minimized  8/16/2021 0319 by Jeniffer Fang RN  Outcome: Ongoing  8/15/2021 1535 by Timi Leon RN  Outcome: Ongoing     Problem: Isolation Precautions - Risk of Spread of Infection  Goal: Prevent transmission of infection  8/16/2021 0319 by Jeniffer Fang RN  Outcome: Ongoing  8/15/2021 1535 by Timi Leon RN  Outcome: Ongoing     Problem: Nutrition Deficits  Goal: Optimize nutritional status  8/16/2021 0319 by Jeniffer Fang RN  Outcome: Ongoing  8/15/2021 1535 by Timi Leon RN  Outcome: Ongoing     Problem: Risk for Fluid Volume Deficit  Goal: Maintain normal heart rhythm  8/16/2021 0319 by Jeniffer Fang RN  Outcome: Ongoing  8/15/2021 1535 by Timi Leon RN  Outcome: Ongoing  Goal: Maintain absence of muscle cramping  8/16/2021 0319 by Lupe Joseph RN  Outcome: Ongoing  8/15/2021 1535 by Cindy Figueredo RN  Outcome: Ongoing  Goal: Maintain normal serum potassium, sodium, calcium, phosphorus, and pH  8/16/2021 0319 by Lupe Joseph RN  Outcome: Ongoing  8/15/2021 1535 by Cindy Figueredo RN  Outcome: Ongoing     Problem: Loneliness or Risk for Loneliness  Goal: Demonstrate positive use of time alone when socialization is not possible  8/16/2021 0319 by Lupe Joseph RN  Outcome: Ongoing  8/15/2021 1535 by Cindy Figueredo RN  Outcome: Ongoing     Problem: Fatigue  Goal: Verbalize increase energy and improved vitality  8/16/2021 0319 by Lupe Joseph RN  Outcome: Ongoing  8/15/2021 1535 by Cindy Figueredo RN  Outcome: Ongoing     Problem: Patient Education: Go to Patient Education Activity  Goal: Patient/Family Education  8/16/2021 0319 by Lupe Joseph RN  Outcome: Ongoing  8/15/2021 1535 by Cindy Figueredo RN  Outcome: Ongoing     Problem: Falls - Risk of:  Goal: Will remain free from falls  Description: Will remain free from falls  8/16/2021 0319 by Lupe Joseph RN  Outcome: Ongoing  8/15/2021 1535 by Cindy Figueredo RN  Outcome: Ongoing  Goal: Absence of physical injury  Description: Absence of physical injury  8/16/2021 0319 by Lupe Joseph RN  Outcome: Ongoing  8/15/2021 1535 by Cindy Figuereod RN  Outcome: Ongoing     Problem: Pain:  Goal: Pain level will decrease  Description: Pain level will decrease  8/16/2021 0319 by Lupe Joseph RN  Outcome: Ongoing  8/15/2021 1535 by Cindy Figueredo RN  Outcome: Ongoing  Goal: Control of acute pain  Description: Control of acute pain  8/16/2021 0319 by Lupe Joseph RN  Outcome: Ongoing  8/15/2021 1535 by Cindy Figueredo RN  Outcome: Ongoing  Goal: Control of chronic pain  Description: Control of chronic pain  8/16/2021 0319 by Lupe Joseph RN  Outcome: Ongoing  8/15/2021 1535 by Cindy Figueredo RN  Outcome: Ongoing

## 2021-08-16 NOTE — PROGRESS NOTES
25 Units Subcutaneous TID  Barney Ruiz MD        hydrALAZINE (APRESOLINE) tablet 50 mg  50 mg Oral 3 times per day Barney Ruiz MD   50 mg at 48/89/11 0412    folic acid (FOLVITE) tablet 1 mg  1 mg Oral Daily Sally Klein MD   1 mg at 08/16/21 0919    dilTIAZem (CARDIZEM CD) extended release capsule 180 mg  180 mg Oral Daily Barney Ruiz MD   180 mg at 08/16/21 0920    lidocaine 4 % external patch 1 patch  1 patch Transdermal Daily PRN Sally Klein MD   1 patch at 08/14/21 0329    hydrocortisone (ANUSOL-HC) 2.5 % rectal cream   Rectal BID Barney Ruiz MD   Given at 08/15/21 2120    linaclotide (LINZESS) capsule 145 mcg  145 mcg Oral QAM AC Sally Klein MD   145 mcg at 08/16/21 0537    insulin lispro (HUMALOG) injection vial 0-12 Units  0-12 Units Subcutaneous TID WC Barney Ruiz MD   8 Units at 08/16/21 0921    insulin lispro (HUMALOG) injection vial 0-6 Units  0-6 Units Subcutaneous Nightly Barney Ruiz MD   3 Units at 08/15/21 2121    glucose (GLUTOSE) 40 % oral gel 15 g  15 g Oral PRN Barney Ruiz MD        dextrose 50 % IV solution  12.5 g Intravenous PRN Barney Ruiz MD        glucagon (rDNA) injection 1 mg  1 mg Intramuscular PRN Barney Ruiz MD        dextrose 5 % solution  100 mL/hr Intravenous PRN Barney Ruiz MD        0.9 % sodium chloride bolus  30 mL Intravenous PRN Barney Ruiz MD   Stopped at 08/15/21 1152    isosorbide mononitrate (IMDUR) extended release tablet 60 mg  60 mg Oral Daily Barney Ruiz MD   60 mg at 08/16/21 0919    iopamidol (ISOVUE-370) 76 % injection 90 mL  90 mL Intravenous ONCE PRN Kyleigh Cervantes MD        nitroGLYCERIN (NITROSTAT) SL tablet 0.4 mg  0.4 mg Sublingual Q5 Min PRN Sally Klein MD        pantoprazole (PROTONIX) tablet 40 mg  40 mg Oral QAM AC Sally Klein MD   40 mg at 08/16/21 0537    sotalol (BETAPACE) tablet 80 mg  80 mg Oral BID Sally Klein MD   80 mg at 08/16/21 0919    spironolactone (ALDACTONE) tablet 50 mg  50 mg Oral Daily Sally Klein MD   50 mg at 08/16/21 0920    tamsulosin (FLOMAX) capsule 0.4 mg  0.4 mg Oral Daily Sally Klein MD   0.4 mg at 08/16/21 0920    tiZANidine (ZANAFLEX) tablet 4 mg  4 mg Oral Q8H PRN Sally Klein MD   4 mg at 08/15/21 1652    sodium chloride flush 0.9 % injection 5-40 mL  5-40 mL Intravenous 2 times per day Sally Klein MD   10 mL at 08/16/21 0921    sodium chloride flush 0.9 % injection 5-40 mL  5-40 mL Intravenous PRN Sally Klein MD        0.9 % sodium chloride infusion  25 mL Intravenous PRN Sally Klein MD        enoxaparin (LOVENOX) injection 30 mg  30 mg Subcutaneous BID Sally Klein MD   30 mg at 08/16/21 0920    acetaminophen (TYLENOL) tablet 650 mg  650 mg Oral Q6H PRN Sally Klein MD   650 mg at 08/15/21 1652    Or    acetaminophen (TYLENOL) suppository 650 mg  650 mg Rectal Q6H PRN Sally Klein MD        guaiFENesin-dextromethorphan (ROBITUSSIN DM) 100-10 MG/5ML syrup 5 mL  5 mL Oral Q4H PRN Sally Klein MD   5 mL at 08/13/21 7982    Vitamin D (CHOLECALCIFEROL) tablet 2,000 Units  2,000 Units Oral Daily Sally Klein MD   2,000 Units at 08/16/21 0919    dexamethasone (DECADRON) tablet 6 mg  6 mg Oral Daily Sally Klein MD   6 mg at 08/15/21 1646    polyethylene glycol (GLYCOLAX) packet 17 g  17 g Oral Daily PRN Sally Klein MD        melatonin disintegrating tablet 5 mg  5 mg Oral Nightly PRN Sally Klein MD   5 mg at 08/15/21 2119    calcium carbonate (TUMS) chewable tablet 500 mg  500 mg Oral TID PRN Sally Klein MD        albuterol sulfate  (90 Base) MCG/ACT inhaler 2 puff  2 puff Inhalation Q4H PRN Sally Klein MD        fluticasone-umeclidin-vilant (TRELEGY ELLIPTA) 012-17.6-56 MCG/INH inhaler 1 puff  1 puff Inhalation Daily Sally Klein MD   1 puff at 08/13/21 2051    promethazine (PHENERGAN) injection 6.25 mg  6.25 mg Intramuscular Q6H PRN Sally Klein MD   6.25 mg at 08/13/21 2046        Labs:     Recent Labs 08/14/21  0520 08/15/21  0327 08/16/21  0348   WBC 1.8* 2.5* 3.5*   RBC 3.96* 4.12* 4.02*   HGB 11.1* 11.5* 11.1*   HCT 35.5* 35.9* 34.4*   MCV 89.6 87.1 85.6   MCH 28.0 27.9 27.6   MCHC 31.3* 32.0* 32.3*   * 125* 109*     Recent Labs     08/14/21  0520 08/15/21  0327 08/16/21  0348   * 134* 131*   K 4.8 5.3* 4.6   ANIONGAP 10 12 9   CL 99 98 95*   CO2 24 24 27   BUN 17 27* 27*   CREATININE 0.7 1.0 0.8   GLUCOSE 317* 395* 332*   CALCIUM 9.1 9.0 8.6*     Recent Labs     08/14/21  0520 08/15/21  0327 08/16/21  0348   MG 1.8 2.1 2.0     Recent Labs     08/14/21  0520 08/15/21  0327 08/16/21  0348   AST 21 21 17   ALT 21 22 22   BILITOT 0.6 0.6 0.6   ALKPHOS 72 75 67     ABGs:No results for input(s): PH, PO2, PCO2, HCO3, BE, O2SAT in the last 72 hours. Troponin T:   Recent Labs     08/13/21  2100 08/14/21 0520   TROPONINI <0.01 <0.01     INR:   No results for input(s): INR in the last 72 hours. Lactic Acid:   No results for input(s): LACTA in the last 72 hours. Objective:   Vitals: /66   Pulse 63   Temp 97.5 °F (36.4 °C) (Temporal)   Resp 16   Ht 5' 9\" (1.753 m)   Wt 243 lb 3 oz (110.3 kg)   SpO2 95%   BMI 35.91 kg/m²   24HR INTAKE/OUTPUT:      Intake/Output Summary (Last 24 hours) at 8/16/2021 1044  Last data filed at 8/16/2021 0946  Gross per 24 hour   Intake 980 ml   Output 1675 ml   Net -695 ml     General appearance: Alert, obese  HEENT: AT/NC  Lungs: BLAE  Heart: RRR  Abdomen: BS+, obese  Extremities: pulses+  Neurologic: Alert, gross motor function intact  Skin: warm    Assessment and Plan: Active Problems:    Acute hypoxemic respiratory failure due to severe acute respiratory syndrome coronavirus 2 (SARS-CoV-2) disease ([facility]    Palliative care patient  Resolved Problems:    * No resolved hospital problems. *    Covid: Saturating well on room air. Bronchodilators. Tocilizumab given during admisison. Decadron. Remdesivir day 5 today. Vitamin D.   Home oxygen screen prior to discharge ordered. Home health ordered. Atrial fibrillation: NSR. Not on anticoagulation. Sotalol continues. ZIO on discharge ordered. Verapamil transitioned to Cardizem p.o. Daily occurrences of A. fib with RVR around 5 or 6 PM - break with cardizem pushes. Suggestion by cardiology to monitor him another 24 hours which we will do. EKG daily. COPD: Bronchodilators. Monitor O2 saturations. Cirrhosis/pancytopenia: PPI. Monitor I's and O's. Former alcoholic. Monitor CBC. DM: Monitor BG and adjust medication as needed. Uncontrolled. Insulin increased again today. HbA1c 8.2. Hypertension: Monitor BP and adjust medication as needed. Supportive management. PT/OT as tolerated.     Advance Directive: Full Code    DVT prophylaxis: Lovenox    Discharge planning: TBD      Signed:  Jah Monet MD 8/16/2021 10:44 AM  Rounding Hospitalist

## 2021-08-16 NOTE — PLAN OF CARE
Problem: Airway Clearance - Ineffective  Goal: Achieve or maintain patent airway  8/16/2021 1538 by Chas Bobby RN  Outcome: Ongoing  8/16/2021 0319 by Yuriy Powell RN  Outcome: Ongoing     Problem: Gas Exchange - Impaired  Goal: Absence of hypoxia  8/16/2021 1538 by Chas Bobby RN  Outcome: Ongoing  8/16/2021 0319 by Yuriy Powell RN  Outcome: Ongoing  Goal: Promote optimal lung function  8/16/2021 1538 by Chas Bobby RN  Outcome: Ongoing  8/16/2021 0319 by Yuriy Powell RN  Outcome: Ongoing     Problem: Breathing Pattern - Ineffective  Goal: Ability to achieve and maintain a regular respiratory rate  8/16/2021 1538 by Chas Bobby RN  Outcome: Ongoing  8/16/2021 0319 by Yuriy Powell RN  Outcome: Ongoing     Problem:  Body Temperature -  Risk of, Imbalanced  Goal: Ability to maintain a body temperature within defined limits  8/16/2021 1538 by Chas Bobby RN  Outcome: Ongoing  8/16/2021 0319 by Yuriy Powell RN  Outcome: Ongoing  Goal: Will regain or maintain usual level of consciousness  8/16/2021 1538 by Chas Bobby RN  Outcome: Ongoing  8/16/2021 0319 by Yuriy Powell RN  Outcome: Ongoing  Goal: Complications related to the disease process, condition or treatment will be avoided or minimized  8/16/2021 1538 by Chas Bobby RN  Outcome: Ongoing  8/16/2021 0319 by Yuriy Powell RN  Outcome: Ongoing     Problem: Isolation Precautions - Risk of Spread of Infection  Goal: Prevent transmission of infection  8/16/2021 1538 by Chas Bobby RN  Outcome: Ongoing  8/16/2021 0319 by Yuriy Powell RN  Outcome: Ongoing     Problem: Nutrition Deficits  Goal: Optimize nutritional status  8/16/2021 1538 by Chas Bobyb RN  Outcome: Ongoing  8/16/2021 0319 by Yuriy Powell RN  Outcome: Ongoing     Problem: Risk for Fluid Volume Deficit  Goal: Maintain normal heart rhythm  8/16/2021 1538 by Chas Bobby RN  Outcome: Ongoing  8/16/2021 0319 by Yuriy Powell RN  Outcome: Ongoing  Goal: Maintain absence of muscle cramping  8/16/2021 1538 by Redd Bautista RN  Outcome: Ongoing  8/16/2021 0319 by Shaquille Colindres RN  Outcome: Ongoing  Goal: Maintain normal serum potassium, sodium, calcium, phosphorus, and pH  8/16/2021 1538 by Redd Bautista RN  Outcome: Ongoing  8/16/2021 0319 by Shaquille Colindres RN  Outcome: Ongoing     Problem: Loneliness or Risk for Loneliness  Goal: Demonstrate positive use of time alone when socialization is not possible  8/16/2021 1538 by Redd Bautista RN  Outcome: Ongoing  8/16/2021 0319 by Shaquille Colindres RN  Outcome: Ongoing     Problem: Fatigue  Goal: Verbalize increase energy and improved vitality  8/16/2021 1538 by Redd Bautista RN  Outcome: Ongoing  8/16/2021 0319 by Shaquille Colindres RN  Outcome: Ongoing     Problem: Patient Education: Go to Patient Education Activity  Goal: Patient/Family Education  8/16/2021 1538 by Redd Bautista RN  Outcome: Ongoing  8/16/2021 0319 by Shaquille Colindres RN  Outcome: Ongoing     Problem: Falls - Risk of:  Goal: Will remain free from falls  Description: Will remain free from falls  8/16/2021 1538 by Redd Bautista RN  Outcome: Ongoing  8/16/2021 0319 by Shaquille Colindres RN  Outcome: Ongoing  Goal: Absence of physical injury  Description: Absence of physical injury  8/16/2021 1538 by Redd Bautista RN  Outcome: Ongoing  8/16/2021 0319 by Shaquille Colindres RN  Outcome: Ongoing     Problem: Pain:  Goal: Pain level will decrease  Description: Pain level will decrease  8/16/2021 1538 by Redd Bautista RN  Outcome: Ongoing  8/16/2021 0319 by Shaquille Colindres RN  Outcome: Ongoing  Goal: Control of acute pain  Description: Control of acute pain  8/16/2021 1538 by Redd Bautista RN  Outcome: Ongoing  8/16/2021 0319 by Shaquille Colindres RN  Outcome: Ongoing  Goal: Control of chronic pain  Description: Control of chronic pain  8/16/2021 1538 by Redd Bautista RN  Outcome: Ongoing  8/16/2021 0319 by Shaquille Colindres RN  Outcome: Ongoing     Problem: Serum Glucose Level - Abnormal:  Goal: Ability to maintain appropriate glucose levels has stabilized  Description: Ability to maintain appropriate glucose levels has stabilized  Outcome: Ongoing

## 2021-08-17 LAB
ALBUMIN SERPL-MCNC: 3.1 G/DL (ref 3.5–5.2)
ALP BLD-CCNC: 75 U/L (ref 40–130)
ALT SERPL-CCNC: 30 U/L (ref 5–41)
ANION GAP SERPL CALCULATED.3IONS-SCNC: 12 MMOL/L (ref 7–19)
AST SERPL-CCNC: 25 U/L (ref 5–40)
BASOPHILS ABSOLUTE: 0 K/UL (ref 0–0.2)
BASOPHILS RELATIVE PERCENT: 0.3 % (ref 0–1)
BILIRUB SERPL-MCNC: 0.6 MG/DL (ref 0.2–1.2)
BUN BLDV-MCNC: 27 MG/DL (ref 8–23)
CALCIUM SERPL-MCNC: 8.6 MG/DL (ref 8.8–10.2)
CHLORIDE BLD-SCNC: 95 MMOL/L (ref 98–111)
CO2: 20 MMOL/L (ref 22–29)
CREAT SERPL-MCNC: 0.9 MG/DL (ref 0.5–1.2)
EOSINOPHILS ABSOLUTE: 0 K/UL (ref 0–0.6)
EOSINOPHILS RELATIVE PERCENT: 0 % (ref 0–5)
GFR AFRICAN AMERICAN: >59
GFR NON-AFRICAN AMERICAN: >60
GLUCOSE BLD-MCNC: 173 MG/DL (ref 70–99)
GLUCOSE BLD-MCNC: 299 MG/DL (ref 70–99)
GLUCOSE BLD-MCNC: 324 MG/DL (ref 70–99)
GLUCOSE BLD-MCNC: 374 MG/DL (ref 70–99)
GLUCOSE BLD-MCNC: 426 MG/DL (ref 74–109)
HCT VFR BLD CALC: 37.2 % (ref 42–52)
HEMOGLOBIN: 11.8 G/DL (ref 14–18)
IMMATURE GRANULOCYTES #: 0.1 K/UL
LYMPHOCYTES ABSOLUTE: 0.2 K/UL (ref 1.1–4.5)
LYMPHOCYTES RELATIVE PERCENT: 3.3 % (ref 20–40)
MAGNESIUM: 1.9 MG/DL (ref 1.6–2.4)
MCH RBC QN AUTO: 27.7 PG (ref 27–31)
MCHC RBC AUTO-ENTMCNC: 31.7 G/DL (ref 33–37)
MCV RBC AUTO: 87.3 FL (ref 80–94)
MONOCYTES ABSOLUTE: 0.2 K/UL (ref 0–0.9)
MONOCYTES RELATIVE PERCENT: 3.3 % (ref 0–10)
NEUTROPHILS ABSOLUTE: 6.5 K/UL (ref 1.5–7.5)
NEUTROPHILS RELATIVE PERCENT: 91.4 % (ref 50–65)
PDW BLD-RTO: 13.9 % (ref 11.5–14.5)
PERFORMED ON: ABNORMAL
PLATELET # BLD: 86 K/UL (ref 130–400)
PMV BLD AUTO: 12.7 FL (ref 9.4–12.4)
POTASSIUM SERPL-SCNC: 5.1 MMOL/L (ref 3.5–5)
RBC # BLD: 4.26 M/UL (ref 4.7–6.1)
SODIUM BLD-SCNC: 127 MMOL/L (ref 136–145)
TOTAL PROTEIN: 5.8 G/DL (ref 6.6–8.7)
WBC # BLD: 7.1 K/UL (ref 4.8–10.8)

## 2021-08-17 PROCEDURE — 6360000002 HC RX W HCPCS: Performed by: HOSPITALIST

## 2021-08-17 PROCEDURE — 97535 SELF CARE MNGMENT TRAINING: CPT

## 2021-08-17 PROCEDURE — 97162 PT EVAL MOD COMPLEX 30 MIN: CPT

## 2021-08-17 PROCEDURE — 2580000003 HC RX 258: Performed by: INTERNAL MEDICINE

## 2021-08-17 PROCEDURE — 97110 THERAPEUTIC EXERCISES: CPT

## 2021-08-17 PROCEDURE — 80053 COMPREHEN METABOLIC PANEL: CPT

## 2021-08-17 PROCEDURE — 94761 N-INVAS EAR/PLS OXIMETRY MLT: CPT

## 2021-08-17 PROCEDURE — 97165 OT EVAL LOW COMPLEX 30 MIN: CPT

## 2021-08-17 PROCEDURE — 82947 ASSAY GLUCOSE BLOOD QUANT: CPT

## 2021-08-17 PROCEDURE — 85025 COMPLETE CBC W/AUTO DIFF WBC: CPT

## 2021-08-17 PROCEDURE — 2580000003 HC RX 258: Performed by: HOSPITALIST

## 2021-08-17 PROCEDURE — 6370000000 HC RX 637 (ALT 250 FOR IP): Performed by: INTERNAL MEDICINE

## 2021-08-17 PROCEDURE — 36415 COLL VENOUS BLD VENIPUNCTURE: CPT

## 2021-08-17 PROCEDURE — 6370000000 HC RX 637 (ALT 250 FOR IP): Performed by: HOSPITALIST

## 2021-08-17 PROCEDURE — 1210000000 HC MED SURG R&B

## 2021-08-17 PROCEDURE — 83735 ASSAY OF MAGNESIUM: CPT

## 2021-08-17 RX ORDER — SODIUM CHLORIDE 9 MG/ML
INJECTION, SOLUTION INTRAVENOUS CONTINUOUS
Status: ACTIVE | OUTPATIENT
Start: 2021-08-17 | End: 2021-08-17

## 2021-08-17 RX ADMIN — SPIRONOLACTONE 50 MG: 50 TABLET ORAL at 08:29

## 2021-08-17 RX ADMIN — PANTOPRAZOLE SODIUM 40 MG: 40 TABLET, DELAYED RELEASE ORAL at 04:53

## 2021-08-17 RX ADMIN — SODIUM CHLORIDE, PRESERVATIVE FREE 10 ML: 5 INJECTION INTRAVENOUS at 20:42

## 2021-08-17 RX ADMIN — SODIUM CHLORIDE: 9 INJECTION, SOLUTION INTRAVENOUS at 12:01

## 2021-08-17 RX ADMIN — ENOXAPARIN SODIUM 30 MG: 30 INJECTION SUBCUTANEOUS at 20:43

## 2021-08-17 RX ADMIN — HYDRALAZINE HYDROCHLORIDE 50 MG: 50 TABLET, FILM COATED ORAL at 20:43

## 2021-08-17 RX ADMIN — HYDROCORTISONE 2.5%: 25 CREAM TOPICAL at 20:44

## 2021-08-17 RX ADMIN — ENOXAPARIN SODIUM 30 MG: 30 INJECTION SUBCUTANEOUS at 08:31

## 2021-08-17 RX ADMIN — HYDRALAZINE HYDROCHLORIDE 50 MG: 50 TABLET, FILM COATED ORAL at 04:53

## 2021-08-17 RX ADMIN — TAMSULOSIN HYDROCHLORIDE 0.4 MG: 0.4 CAPSULE ORAL at 08:29

## 2021-08-17 RX ADMIN — SOTALOL HYDROCHLORIDE 80 MG: 80 TABLET ORAL at 08:30

## 2021-08-17 RX ADMIN — INSULIN GLARGINE 40 UNITS: 100 INJECTION, SOLUTION SUBCUTANEOUS at 08:44

## 2021-08-17 RX ADMIN — ISOSORBIDE MONONITRATE 60 MG: 60 TABLET, EXTENDED RELEASE ORAL at 08:29

## 2021-08-17 RX ADMIN — LINACLOTIDE 145 MCG: 145 CAPSULE, GELATIN COATED ORAL at 04:53

## 2021-08-17 RX ADMIN — INSULIN GLARGINE 40 UNITS: 100 INJECTION, SOLUTION SUBCUTANEOUS at 20:44

## 2021-08-17 RX ADMIN — DILTIAZEM HYDROCHLORIDE 180 MG: 180 CAPSULE, COATED, EXTENDED RELEASE ORAL at 08:30

## 2021-08-17 RX ADMIN — SOTALOL HYDROCHLORIDE 80 MG: 80 TABLET ORAL at 20:43

## 2021-08-17 RX ADMIN — Medication 2000 UNITS: at 08:29

## 2021-08-17 RX ADMIN — HYDRALAZINE HYDROCHLORIDE 50 MG: 50 TABLET, FILM COATED ORAL at 13:56

## 2021-08-17 ASSESSMENT — PAIN - FUNCTIONAL ASSESSMENT
PAIN_FUNCTIONAL_ASSESSMENT: PREVENTS OR INTERFERES SOME ACTIVE ACTIVITIES AND ADLS
PAIN_FUNCTIONAL_ASSESSMENT: PREVENTS OR INTERFERES SOME ACTIVE ACTIVITIES AND ADLS

## 2021-08-17 ASSESSMENT — PAIN DESCRIPTION - LOCATION
LOCATION: BACK;FOOT
LOCATION: BACK;FOOT

## 2021-08-17 ASSESSMENT — PAIN DESCRIPTION - PAIN TYPE
TYPE: CHRONIC PAIN
TYPE: CHRONIC PAIN

## 2021-08-17 ASSESSMENT — PAIN DESCRIPTION - DESCRIPTORS
DESCRIPTORS: CONSTANT;ACHING
DESCRIPTORS: ACHING

## 2021-08-17 ASSESSMENT — PAIN SCALES - GENERAL
PAINLEVEL_OUTOF10: 6
PAINLEVEL_OUTOF10: 6

## 2021-08-17 NOTE — PROGRESS NOTES
[facility]        Hospitalist Progress Note  8/17/2021 10:37 AM  Subjective:   Admit Date: 8/11/2021  PCP: Raymond Garcia MD    Chief Complaint: Dyspnea    Subjective: Patient seen and examined at bedside. Comfortable. Wants to go home. [facility] History: 68-year-old obese male with a past medical history of alcoholic cirrhosis, COPD, diabetes, hypertension, atrial fibrillation on sotalol not on anticoagulation, Covid positive since 7/23/2021 presenting to the hospital for dyspnea found to be hypoxic admitted for further management. Tocilizumab given. Patient transitioned to 4th floor COVID and shortly after noted to have atrial fibrillation with RVR with rates into the 150s and associated chest pain with rapid response called and patient was transitioned back to CCU for Cardizem drip and cardiology consult. Fortunately patient converted spontaneously shortly after arrival to CCU and is remained in normal sinus rhythm. Transition back to routine medical floor for for Covid. Cardiology on board for intermittent runs of A. fib with RVR. Verapamil transitioned to Cardizem with cardiology. ROS: 14 point review of systems is negative except as specifically addressed above. ADULT DIET; Regular; 3 carb choices (45 gm/meal);  Low Fat/Low Chol/High Fiber/2 gm Na    Intake/Output Summary (Last 24 hours) at 8/17/2021 1037  Last data filed at 8/17/2021 0754  Gross per 24 hour   Intake --   Output 2175 ml   Net -2175 ml     Medications:   sodium chloride      dextrose      sodium chloride       Current Facility-Administered Medications   Medication Dose Route Frequency Provider Last Rate Last Admin    0.9 % sodium chloride infusion   Intravenous Continuous Aldo Ford MD        insulin glargine (LANTUS) injection vial 40 Units  40 Units Subcutaneous BID Aldo Ford MD   40 Units at 08/16/21 2108    insulin lispro (HUMALOG) injection vial 25 Units  25 Units Subcutaneous TID VINOD Lr Mary Godfrey MD   25 Units at 08/16/21 1300    hydrALAZINE (APRESOLINE) tablet 50 mg  50 mg Oral 3 times per day Bertrand Ryan MD   50 mg at 38/41/66 1693    folic acid (FOLVITE) tablet 1 mg  1 mg Oral Daily Delphine Lewis MD   1 mg at 08/16/21 0919    dilTIAZem (CARDIZEM CD) extended release capsule 180 mg  180 mg Oral Daily Bertrand Ryan MD   180 mg at 08/17/21 0830    lidocaine 4 % external patch 1 patch  1 patch Transdermal Daily PRN Delphine Lewis MD   1 patch at 08/14/21 0329    hydrocortisone (ANUSOL-HC) 2.5 % rectal cream   Rectal BID Bertrand Ryan MD   Given at 08/16/21 2212    linaclotide (LINZESS) capsule 145 mcg  145 mcg Oral QAM AC Delphine Lewis MD   145 mcg at 08/17/21 0453    insulin lispro (HUMALOG) injection vial 0-12 Units  0-12 Units Subcutaneous TID WC Bertrand Ryan MD   2 Units at 08/16/21 1837    insulin lispro (HUMALOG) injection vial 0-6 Units  0-6 Units Subcutaneous Nightly Bertrand Ryan MD   3 Units at 08/15/21 2121    glucose (GLUTOSE) 40 % oral gel 15 g  15 g Oral PRN Bertrand Ryan MD        dextrose 50 % IV solution  12.5 g Intravenous PRN Bertrand Ryan MD        glucagon (rDNA) injection 1 mg  1 mg Intramuscular PRN Bertrand Ryan MD        dextrose 5 % solution  100 mL/hr Intravenous PRN Bertrand Ryan MD        0.9 % sodium chloride bolus  30 mL Intravenous PRN Bertrand Ryan MD   Stopped at 08/15/21 1152    isosorbide mononitrate (IMDUR) extended release tablet 60 mg  60 mg Oral Daily Bertrand Ryan MD   60 mg at 08/17/21 0829    iopamidol (ISOVUE-370) 76 % injection 90 mL  90 mL Intravenous ONCE PRN Yanet Hankins MD        nitroGLYCERIN (NITROSTAT) SL tablet 0.4 mg  0.4 mg Sublingual Q5 Min PRN Delphine Lewis MD        pantoprazole (PROTONIX) tablet 40 mg  40 mg Oral QAM AC Delphine Lewis MD   40 mg at 08/17/21 0453    sotalol (BETAPACE) tablet 80 mg  80 mg Oral BID Delphine Lewis MD   80 mg at 08/17/21 0830    spironolactone (ALDACTONE) tablet 50 mg  50 mg Oral Daily Delphine Lewis MD   50 mg at 08/17/21 0829    tamsulosin (FLOMAX) capsule 0.4 mg  0.4 mg Oral Daily Delphine Lewis MD   0.4 mg at 08/17/21 0829    tiZANidine (ZANAFLEX) tablet 4 mg  4 mg Oral Q8H PRN Delphine Lewis MD   4 mg at 08/16/21 1216    sodium chloride flush 0.9 % injection 5-40 mL  5-40 mL Intravenous 2 times per day Delphine Lewis MD   10 mL at 08/16/21 2106    sodium chloride flush 0.9 % injection 5-40 mL  5-40 mL Intravenous PRN Delphine Lewis MD        0.9 % sodium chloride infusion  25 mL Intravenous PRN Delphine Lewis MD        enoxaparin (LOVENOX) injection 30 mg  30 mg Subcutaneous BID Delphine Lewis MD   30 mg at 08/17/21 0831    acetaminophen (TYLENOL) tablet 650 mg  650 mg Oral Q6H PRN Delphine Lewis MD   650 mg at 08/16/21 1844    Or    acetaminophen (TYLENOL) suppository 650 mg  650 mg Rectal Q6H PRN Delphine Lewis MD        guaiFENesin-dextromethorphan (ROBITUSSIN DM) 100-10 MG/5ML syrup 5 mL  5 mL Oral Q4H PRN Delphine Lewis MD   5 mL at 08/13/21 7314    Vitamin D (CHOLECALCIFEROL) tablet 2,000 Units  2,000 Units Oral Daily Delphine Lewis MD   2,000 Units at 08/17/21 0829    polyethylene glycol (GLYCOLAX) packet 17 g  17 g Oral Daily PRN Delphine Lewis MD        melatonin disintegrating tablet 5 mg  5 mg Oral Nightly PRN Delphine Lewis MD   5 mg at 08/15/21 2119    calcium carbonate (TUMS) chewable tablet 500 mg  500 mg Oral TID PRN Delphine Lewis MD        albuterol sulfate  (90 Base) MCG/ACT inhaler 2 puff  2 puff Inhalation Q4H PRN Delphine Lewis MD        fluticasone-umeclidin-vilant (TRELEGY ELLIPTA) 015-25.3-48 MCG/INH inhaler 1 puff  1 puff Inhalation Daily Delphine Lewis MD   1 puff at 08/13/21 2051    promethazine (PHENERGAN) injection 6.25 mg  6.25 mg Intramuscular Q6H PRN Delphine Lewis MD   6.25 mg at 08/16/21 1844        Labs:     Recent Labs     08/15/21  0327 08/16/21  0348 08/17/21  0348   WBC 2.5* 3.5* 7.1   RBC 4.12* 4.02* 4.26*   HGB 11.5* 11.1* 11.8* HCT 35.9* 34.4* 37.2*   MCV 87.1 85.6 87.3   MCH 27.9 27.6 27.7   MCHC 32.0* 32.3* 31.7*   * 109* 86*     Recent Labs     08/15/21  0327 08/16/21  0348 08/17/21  0348   * 131* 127*   K 5.3* 4.6 5.1*   ANIONGAP 12 9 12   CL 98 95* 95*   CO2 24 27 20*   BUN 27* 27* 27*   CREATININE 1.0 0.8 0.9   GLUCOSE 395* 332* 426*   CALCIUM 9.0 8.6* 8.6*     Recent Labs     08/15/21  0327 08/16/21  0348 08/17/21  0348   MG 2.1 2.0 1.9     Recent Labs     08/15/21  0327 08/16/21  0348 08/17/21  0348   AST 21 17 25   ALT 22 22 30   BILITOT 0.6 0.6 0.6   ALKPHOS 75 67 75     ABGs:No results for input(s): PH, PO2, PCO2, HCO3, BE, O2SAT in the last 72 hours. Troponin T:   No results for input(s): TROPONINI in the last 72 hours. INR:   No results for input(s): INR in the last 72 hours. Lactic Acid:   No results for input(s): LACTA in the last 72 hours. Objective:   Vitals: /64   Pulse 67   Temp 97.5 °F (36.4 °C) (Temporal)   Resp 20   Ht 5' 9\" (1.753 m)   Wt 243 lb 3 oz (110.3 kg)   SpO2 96%   BMI 35.91 kg/m²   24HR INTAKE/OUTPUT:      Intake/Output Summary (Last 24 hours) at 8/17/2021 1037  Last data filed at 8/17/2021 0754  Gross per 24 hour   Intake --   Output 2175 ml   Net -2175 ml     General appearance: Alert, obese  HEENT: AT/NC  Lungs: BLAE  Heart: RRR  Abdomen: BS+, obese  Extremities: pulses+  Neurologic: Alert, gross motor function intact  Skin: warm    Assessment and Plan: Active Problems:    Acute hypoxemic respiratory failure due to severe acute respiratory syndrome coronavirus 2 (SARS-CoV-2) disease ([facility] [address]    Palliative care patient  Resolved Problems:    * No resolved hospital problems. *    Covid: Saturating well on room air. Bronchodilators. Tocilizumab given during admisison. Decadron stopped - very uncontrolled BG. Remdesivir course completed. Vitamin D. Home oxygen screen prior to discharge ordered. Home health ordered. Atrial fibrillation: NSR. Not on AC. Cardizem.  Haylee Ellis on discharge. COPD: Bronchodilators. Monitor O2 saturations. Cirrhosis/pancytopenia: PPI. Monitor I's and O's. Former alcoholic. Monitor CBC. DM: Monitor BG and adjust medication as needed. Uncontrolled - patient is eating multiple snacks and drinking orange juice throughout the day - I have asked him to stop this and placed a nursing order for snacks and juices to be removed from his room. HbA1c 8.2. Hypertension: Monitor BP and adjust medication as needed. Supportive management. PT/OT as tolerated.     Advance Directive: Full Code    DVT prophylaxis: Lovenox    Discharge planning: TBD - home soon      Signed:  Kelli Willis MD 8/17/2021 10:37 AM  [facility] Hospitalist

## 2021-08-17 NOTE — PROGRESS NOTES
Physical Therapy    Facility/Department: [facility] ONCOLOGY UNIT  Initial Assessment    NAME: Rosalino Davila  : 1960  MRN: [id number]    Date of Service: 2021    Discharge Recommendations:  Continue to assess pending progress, 24 hour supervision or assist, Patient would benefit from continued therapy after discharge        Assessment   Body structures, Functions, Activity limitations: Decreased functional mobility ; Decreased endurance  Assessment: Pt. will benefit from progressive amb. to increase endurance and mobility. Pt. a slight fall risk due to low endurance and quickly fatiguing. Anticipate pt would be able to d/c home with PRN A if available. Treatment Diagnosis: deconditioning  Prognosis: Good  Decision Making: Medium Complexity  PT Education: Goals;PT Role;Plan of Care;Transfer Training;Functional Mobility Training;General Safety;Gait Training  Patient Education: use of call light  Barriers to Learning: none noted  REQUIRES PT FOLLOW UP: Yes  Activity Tolerance  Activity Tolerance: Patient limited by fatigue;Patient limited by endurance       Patient Diagnosis(es): The primary encounter diagnosis was Acute respiratory failure with hypoxia ([address] A diagnosis of COVID-19 was also pertinent to this visit. has a past medical history of Ascites, Bradycardia, Chronic back pain, Cirrhosis ([address] COPD (chronic obstructive pulmonary disease) ([address] Diverticulitis, Esophageal varices ([address] GERD (gastroesophageal reflux disease), Headache, Liver disease, Palliative care patient, Prostate enlargement, and SVT (supraventricular tachycardia) ([address] has a past surgical history that includes fracture surgery (Left, ); hernia repair; and Cardiac catheterization.     Restrictions  Restrictions/Precautions  Restrictions/Precautions: Fall Risk, Isolation, Contact Precautions  Required Braces or Orthoses?: No  Position Activity Restriction  Other position/activity restrictions: droplet +  Vision/Hearing  Hearing: Within functional limits     Subjective  General  Chart Reviewed: Yes  Patient assessed for rehabilitation services?: Yes  Response To Previous Treatment: Not applicable  Family / Caregiver Present: No  Referring Practitioner: William Montoya MD  Referral Date : 08/13/21  Diagnosis: acute respiratory failure with hypoxia, covid 19  Follows Commands: Within Functional Limits  General Comment  Comments: RNSergey PT. Subjective  Subjective: Pt. reports he has not been up much, but did go to the bathroom the other day. Reports that sent him into afib. Pain Screening  Patient Currently in Pain: Yes  Pain Assessment  Pain Assessment: 0-10  Pain Level: 6  Pain Type: Chronic pain  Pain Location: Back; Foot  Pain Descriptors: Constant; Aching  Functional Pain Assessment: Prevents or interferes some active activities and ADLs  Non-Pharmaceutical Pain Intervention(s): Repositioned; Ambulation/Increased Activity  Response to Pain Intervention: Patient Satisfied  Vital Signs  Pulse: 79  Patient Currently in Pain: Yes  Oxygen Therapy  SpO2: 96 %  Pulse Oximeter Device Mode: Intermittent  O2 Device: None (Room air)  Pre Treatment Pain Screening  Intervention List: Patient able to continue with treatment;Nurse/physician notified    Orientation  Orientation  Overall Orientation Status: Within Functional Limits  Social/Functional History  Social/Functional History  Lives With: Alone  Type of Home: House  Bathroom Shower/Tub: Shower chair without back  Home Equipment: Rolling walker, Cane  ADL Assistance: Independent  Ambulation Assistance: Independent  Transfer Assistance: Independent  Cognition   Cognition  Overall Cognitive Status: WFL    Objective     Observation/Palpation  Posture: Good    AROM RLE (degrees)  RLE AROM: WFL  AROM LLE (degrees)  LLE AROM : WFL  Strength RLE  Strength RLE: WFL  Comment: grossly 4/5  Strength LLE  Strength LLE: WFL  Comment: grossly 4/5     Sensation  Overall Sensation Status:  (reports tingling/numbness R lateral thigh, L 4th and 5th fingers numb from old shoulder injury)  Bed mobility  Supine to Sit: Stand by assistance  Sit to Supine: Unable to assess  Comment: sat on EOB x 5 mins SBA  Transfers  Sit to Stand: Contact guard assistance  Stand to sit: Contact guard assistance  Comment: used RW  Ambulation  Ambulation?: Yes  Ambulation 1  Surface: level tile  Device: Rolling Walker  Assistance: Contact guard assistance  Quality of Gait: steady  Gait Deviations: Slow Shira;Decreased step length;Decreased step height  Distance: 40'  Comments: 1 standing rest break, pt instructed in pursed lip breathing, but states he couldn't do it since he's a mouth breather. Balance  Posture: Good  Sitting - Static: Good;+  Sitting - Dynamic: Good;-  Standing - Static: +  Standing - Dynamic: Fair;-        Plan   Plan  Times per week: 3-7  Plan weeks: 2  Current Treatment Recommendations: Strengthening, Balance Training, Functional Mobility Training, Transfer Training, Gait Training, Endurance Training, Safety Education & Training, Positioning, Equipment Evaluation, Education, & procurement, Patient/Caregiver Education & Training  Plan Comment: cont. PT per POC.   Safety Devices  Type of devices: Gait belt, Patient at risk for falls, Nurse notified, Call light within reach, Left in chair, Chair alarm in place    G-Code       OutComes Score                                                  AM-PAC Score             Goals  Short term goals  Time Frame for Short term goals: 2 wks  Short term goal 1: supine to sit indep  Short term goal 2: sit to stand indep  Short term goal 3: amb. 100' with RW SBA  Patient Goals   Patient goals : go home soon       Therapy Time   Individual Concurrent Group Co-treatment   Time In           Time Out           Minutes                   Joy Buckner PT    Electronically signed by Joy Buckner PT on 8/17/2021 at 10:27 AM

## 2021-08-17 NOTE — PROGRESS NOTES
Occupational Therapy   Occupational Therapy Initial Assessment  Date: 2021   Patient Name: Amy Johnson  MRN: [id number]     : 1960    Date of Service: 2021    Discharge Recommendations:  Home with assist PRN  OT Equipment Recommendations  Equipment Needed: No    Assessment   Assessment: Evaluation completed and tx initiated. No losses of balance during ADL or light home management. Recommendations made this visit. No overt barriers to stated discharge plan  Treatment Diagnosis: Acute hypoxic respiratory failure, Coronavirus  REQUIRES OT FOLLOW UP: No  Activity Tolerance  Activity Tolerance: Patient Tolerated treatment well  Activity Tolerance: Maintain SPO2 at 96% after paced activity  Safety Devices  Safety Devices in place: Yes  Type of devices: Chair alarm in place;Call light within reach           Patient Diagnosis(es): The primary encounter diagnosis was Acute respiratory failure with hypoxia ([address] A diagnosis of COVID-19 was also pertinent to this visit. has a past medical history of Ascites, Bradycardia, Chronic back pain, Cirrhosis ([address] COPD (chronic obstructive pulmonary disease) ([address] Diverticulitis, Esophageal varices ([address] GERD (gastroesophageal reflux disease), Headache, Liver disease, Palliative care patient, Prostate enlargement, and SVT (supraventricular tachycardia) ([address] has a past surgical history that includes fracture surgery (Left, ); hernia repair; and Cardiac catheterization.     Treatment Diagnosis: Acute hypoxic respiratory failure, Coronavirus      Restrictions  Restrictions/Precautions  Restrictions/Precautions: Fall Risk, Isolation, Contact Precautions  Required Braces or Orthoses?: No  Position Activity Restriction  Other position/activity restrictions: droplet +    Subjective   General  Chart Reviewed: Yes  Patient assessed for rehabilitation services?: Yes  Family / Caregiver Present: No  Patient Currently in Pain: Yes  Pain Assessment  Pain Assessment: 0-10  Pain Level: 6  Pain Type: Chronic pain  Pain Location: Back; Foot  Pain Descriptors: Aching  Functional Pain Assessment: Prevents or interferes some active activities and ADLs  Non-Pharmaceutical Pain Intervention(s): Ambulation/Increased Activity;Repositioned  Response to Pain Intervention: Patient Satisfied (does not ask nurse to be contacted for meds)  Vital Signs  Temp: 97.1 °F (36.2 °C)  Temp Source: Temporal  Pulse: 54  Heart Rate Source: Monitor  Resp: 20  BP: (!) 126/59  BP Location: Right upper arm  MAP (mmHg): 77  Patient Currently in Pain: Yes  Oxygen Therapy  SpO2: 95 %  Pulse Oximeter Device Mode: Intermittent  O2 Device: None (Room air)  Social/Functional History  Social/Functional History  Lives With: Alone  Type of Home: House  Bathroom Shower/Tub: Shower chair without back  Home Equipment: Rolling walker, Cane  ADL Assistance: Independent  Ambulation Assistance: Independent  Transfer Assistance: Independent       Objective        Orientation  Overall Orientation Status: Within Normal Limits  Observation/Palpation  Posture: Good  Functional Mobility  Functional - Mobility Device: Rolling Walker  Assist Level: Contact guard assistance  Functional Mobility Comments: light ambulatory ADL  Toilet Transfers  Toilet - Technique: Ambulating  Toilet Transfer: Contact guard assistance  ADL  Feeding: Independent  Grooming: Independent  UE Bathing: Supervision  LE Bathing: Contact guard assistance  UE Dressing: Independent  LE Dressing: Contact guard assistance  Toileting: Contact guard assistance        Bed mobility  Supine to Sit: Independent  Transfers  Stand Step Transfers: Supervision     Cognition  Overall Cognitive Status: WNL        Sensation  Overall Sensation Status:  (reports tingling/numbness R lateral thigh, L 4th and 5th fingers numb from old shoulder injury)        LUE PROM (degrees)  LUE PROM: WNL  LUE AROM (degrees)  LUE AROM : WNL  RUE PROM (degrees)  RUE PROM: WNL  RUE AROM (degrees)  RUE AROM : WNL                    Tx initiated:  Training in therapeutic activity, pacing energy conservation, and positioning. (15 mins)    Plan   Plan  Plan Comment: No further visits planned    G-Code     OutComes Score                                                  AM-PAC Score             Goals  Short term goals  Short term goal 1: Patient will be independent to state/verbalize therapeutic activity/positioning, and energy conservation techniques (met)       Therapy Time   Individual Concurrent Group Co-treatment   Time In           Time Out           Minutes                   Rosa Diallo OT Electronically signed by Rosa Diallo OT on 8/17/2021 at 1:21 PM

## 2021-08-17 NOTE — PROGRESS NOTES
Nutrition Assessment     Type and Reason for Visit: Initial, RD Nutrition Re-Screen/LOS    Nutrition Recommendations/Plan: Continue current diet. Nutrition Assessment:  LOS day 6. Pt adequately nourished AEB adqeuate po intakes. 6% wt loss since admission noted. Accuchecks 151-360, Hgb A1C 8.2%, aware steriods stopped and MD restricted diet to 3 CHO and advised pt to stop consuming juices and high CHO snacks- agree. Continur current diet at this time. Monitor for education needs. Malnutrition Assessment:  Malnutrition Status: No malnutrition    Current Nutrition Therapies:    ADULT DIET; Regular; 3 carb choices (45 gm/meal); Low Fat/Low Chol/High Fiber/2 gm Na    Anthropometric Measures:  · Height: 5' 9\" (175.3 cm)  · Current Body Wt: 243 lb (110.2 kg)   · BMI: 35.9    Nutrition Interventions:   Food and/or Nutrient Delivery:  Continue Current Diet   Coordination of Nutrition Care:  Continue to monitor while inpatient    Goals:  Po intake remains adequate; accuchecks < 200.        Nutrition Monitoring and Evaluation:   Behavioral-Environmental Outcomes:  Readiness for Change   Food/Nutrient Intake Outcomes:  Food and Nutrient Intake  Physical Signs/Symptoms Outcomes:  Biochemical Data, Nutrition Focused Physical Findings, Weight     Discharge Planning:    Continue current diet, Recommend pursue outpatient diabetes education     Electronically signed by Mikhail Ricci MS, RD, LD on 8/17/21 at 3:21 PM CDT    Contact: [phone]

## 2021-08-18 VITALS
RESPIRATION RATE: 14 BRPM | OXYGEN SATURATION: 96 % | BODY MASS INDEX: 36.6 KG/M2 | DIASTOLIC BLOOD PRESSURE: 60 MMHG | HEIGHT: 69 IN | TEMPERATURE: 98.6 F | HEART RATE: 62 BPM | WEIGHT: 247.13 LBS | SYSTOLIC BLOOD PRESSURE: 143 MMHG

## 2021-08-18 LAB
ALBUMIN SERPL-MCNC: 2.9 G/DL (ref 3.5–5.2)
ALP BLD-CCNC: 80 U/L (ref 40–130)
ALT SERPL-CCNC: 26 U/L (ref 5–41)
ANION GAP SERPL CALCULATED.3IONS-SCNC: 10 MMOL/L (ref 7–19)
AST SERPL-CCNC: 19 U/L (ref 5–40)
BASOPHILS ABSOLUTE: 0 K/UL (ref 0–0.2)
BASOPHILS RELATIVE PERCENT: 0.3 % (ref 0–1)
BILIRUB SERPL-MCNC: 0.6 MG/DL (ref 0.2–1.2)
BUN BLDV-MCNC: 30 MG/DL (ref 8–23)
CALCIUM SERPL-MCNC: 8.7 MG/DL (ref 8.8–10.2)
CHLORIDE BLD-SCNC: 100 MMOL/L (ref 98–111)
CO2: 23 MMOL/L (ref 22–29)
CREAT SERPL-MCNC: 0.7 MG/DL (ref 0.5–1.2)
EOSINOPHILS ABSOLUTE: 0 K/UL (ref 0–0.6)
EOSINOPHILS RELATIVE PERCENT: 0 % (ref 0–5)
GFR AFRICAN AMERICAN: >59
GFR NON-AFRICAN AMERICAN: >60
GLUCOSE BLD-MCNC: 203 MG/DL (ref 70–99)
GLUCOSE BLD-MCNC: 261 MG/DL (ref 74–109)
GLUCOSE BLD-MCNC: 314 MG/DL (ref 70–99)
HCT VFR BLD CALC: 36.9 % (ref 42–52)
HEMOGLOBIN: 12 G/DL (ref 14–18)
IMMATURE GRANULOCYTES #: 0.3 K/UL
LYMPHOCYTES ABSOLUTE: 0.3 K/UL (ref 1.1–4.5)
LYMPHOCYTES RELATIVE PERCENT: 4.7 % (ref 20–40)
MAGNESIUM: 2.1 MG/DL (ref 1.6–2.4)
MCH RBC QN AUTO: 28.4 PG (ref 27–31)
MCHC RBC AUTO-ENTMCNC: 32.5 G/DL (ref 33–37)
MCV RBC AUTO: 87.2 FL (ref 80–94)
MONOCYTES ABSOLUTE: 0.5 K/UL (ref 0–0.9)
MONOCYTES RELATIVE PERCENT: 7.5 % (ref 0–10)
NEUTROPHILS ABSOLUTE: 5.5 K/UL (ref 1.5–7.5)
NEUTROPHILS RELATIVE PERCENT: 83 % (ref 50–65)
PDW BLD-RTO: 13.7 % (ref 11.5–14.5)
PERFORMED ON: ABNORMAL
PERFORMED ON: ABNORMAL
PLATELET # BLD: 78 K/UL (ref 130–400)
PMV BLD AUTO: 12.8 FL (ref 9.4–12.4)
POTASSIUM SERPL-SCNC: 4.6 MMOL/L (ref 3.5–5)
RBC # BLD: 4.23 M/UL (ref 4.7–6.1)
SODIUM BLD-SCNC: 133 MMOL/L (ref 136–145)
TOTAL PROTEIN: 5.4 G/DL (ref 6.6–8.7)
WBC # BLD: 6.6 K/UL (ref 4.8–10.8)

## 2021-08-18 PROCEDURE — 85025 COMPLETE CBC W/AUTO DIFF WBC: CPT

## 2021-08-18 PROCEDURE — 6360000002 HC RX W HCPCS: Performed by: HOSPITALIST

## 2021-08-18 PROCEDURE — 80053 COMPREHEN METABOLIC PANEL: CPT

## 2021-08-18 PROCEDURE — 93246 EXT ECG>7D<15D RECORDING: CPT

## 2021-08-18 PROCEDURE — 83735 ASSAY OF MAGNESIUM: CPT

## 2021-08-18 PROCEDURE — 6370000000 HC RX 637 (ALT 250 FOR IP): Performed by: INTERNAL MEDICINE

## 2021-08-18 PROCEDURE — 2580000003 HC RX 258: Performed by: HOSPITALIST

## 2021-08-18 PROCEDURE — 82947 ASSAY GLUCOSE BLOOD QUANT: CPT

## 2021-08-18 PROCEDURE — 6370000000 HC RX 637 (ALT 250 FOR IP): Performed by: HOSPITALIST

## 2021-08-18 PROCEDURE — 36415 COLL VENOUS BLD VENIPUNCTURE: CPT

## 2021-08-18 RX ORDER — HYDRALAZINE HYDROCHLORIDE 50 MG/1
50 TABLET, FILM COATED ORAL EVERY 8 HOURS SCHEDULED
Qty: 90 TABLET | Refills: 0 | Status: SHIPPED | OUTPATIENT
Start: 2021-08-18

## 2021-08-18 RX ORDER — ONDANSETRON 4 MG/1
4 TABLET, ORALLY DISINTEGRATING ORAL ONCE
Status: COMPLETED | OUTPATIENT
Start: 2021-08-18 | End: 2021-08-18

## 2021-08-18 RX ORDER — DILTIAZEM HYDROCHLORIDE 180 MG/1
180 CAPSULE, COATED, EXTENDED RELEASE ORAL DAILY
Qty: 30 CAPSULE | Refills: 0 | Status: SHIPPED | OUTPATIENT
Start: 2021-08-18 | End: 2021-08-24 | Stop reason: SDUPTHER

## 2021-08-18 RX ORDER — ISOSORBIDE MONONITRATE 60 MG/1
60 TABLET, EXTENDED RELEASE ORAL DAILY
Qty: 30 TABLET | Refills: 0 | Status: SHIPPED | OUTPATIENT
Start: 2021-08-18 | End: 2021-08-24 | Stop reason: SDUPTHER

## 2021-08-18 RX ORDER — OXYCODONE HYDROCHLORIDE 5 MG/1
10 TABLET ORAL ONCE
Status: COMPLETED | OUTPATIENT
Start: 2021-08-18 | End: 2021-08-18

## 2021-08-18 RX ORDER — CHOLECALCIFEROL (VITAMIN D3) 50 MCG
2000 TABLET ORAL DAILY
Qty: 30 TABLET | Refills: 0 | Status: SHIPPED | OUTPATIENT
Start: 2021-08-18 | End: 2021-09-17

## 2021-08-18 RX ORDER — ONDANSETRON 4 MG/1
TABLET, ORALLY DISINTEGRATING ORAL
Status: DISCONTINUED
Start: 2021-08-18 | End: 2021-08-18 | Stop reason: HOSPADM

## 2021-08-18 RX ADMIN — ISOSORBIDE MONONITRATE 60 MG: 60 TABLET, EXTENDED RELEASE ORAL at 10:18

## 2021-08-18 RX ADMIN — ENOXAPARIN SODIUM 30 MG: 30 INJECTION SUBCUTANEOUS at 10:20

## 2021-08-18 RX ADMIN — HYDRALAZINE HYDROCHLORIDE 50 MG: 50 TABLET, FILM COATED ORAL at 14:35

## 2021-08-18 RX ADMIN — OXYCODONE 10 MG: 5 TABLET ORAL at 14:48

## 2021-08-18 RX ADMIN — PANTOPRAZOLE SODIUM 40 MG: 40 TABLET, DELAYED RELEASE ORAL at 05:13

## 2021-08-18 RX ADMIN — SODIUM CHLORIDE, PRESERVATIVE FREE 10 ML: 5 INJECTION INTRAVENOUS at 10:34

## 2021-08-18 RX ADMIN — TAMSULOSIN HYDROCHLORIDE 0.4 MG: 0.4 CAPSULE ORAL at 10:19

## 2021-08-18 RX ADMIN — SOTALOL HYDROCHLORIDE 80 MG: 80 TABLET ORAL at 10:19

## 2021-08-18 RX ADMIN — LINACLOTIDE 145 MCG: 145 CAPSULE, GELATIN COATED ORAL at 05:13

## 2021-08-18 RX ADMIN — ONDANSETRON 4 MG: 4 TABLET, ORALLY DISINTEGRATING ORAL at 10:15

## 2021-08-18 RX ADMIN — Medication 2000 UNITS: at 10:17

## 2021-08-18 RX ADMIN — SPIRONOLACTONE 50 MG: 50 TABLET ORAL at 10:19

## 2021-08-18 RX ADMIN — DILTIAZEM HYDROCHLORIDE 180 MG: 180 CAPSULE, COATED, EXTENDED RELEASE ORAL at 10:19

## 2021-08-18 RX ADMIN — HYDRALAZINE HYDROCHLORIDE 50 MG: 50 TABLET, FILM COATED ORAL at 05:13

## 2021-08-18 RX ADMIN — INSULIN GLARGINE 40 UNITS: 100 INJECTION, SOLUTION SUBCUTANEOUS at 10:21

## 2021-08-18 ASSESSMENT — PAIN SCALES - GENERAL: PAINLEVEL_OUTOF10: 9

## 2021-08-18 NOTE — DISCHARGE SUMMARY
Discharge Summary      Dionne Bernard  :  1960  MRN:  [id number]    Admit date:  2021  Discharge date:      Admitting Physician:  William Montoya MD    Advance Directive: Full Code    Consults: cardiology    Primary Care Physician:  Nimisha Vinson MD    Discharge Diagnoses:  Principal Problem:    Acute hypoxemic respiratory failure due to severe acute respiratory syndrome coronavirus 2 (SARS-CoV-2) disease [facility])  Active Problems:    Palliative care patient  Resolved Problems:    * No resolved hospital problems. *      Significant Diagnostic Studies:   CT ABDOMEN PELVIS W IV CONTRAST Additional Contrast? None    Result Date: 2021  CT ABDOMEN PELVIS W IV CONTRAST 2021 6:40 PM HISTORY: Pain with abdominal distention and vomiting COMPARISON: 2021. DLP: 1480 mGy cm. All CT scans are performed using dose optimization techniques as appropriate to the performed exam and include at least one of the following: Automated exposure control, adjustment of the mA and/or kV according to size, and the use of the iterative reconstruction technique. TECHNIQUE: Following the intravenous administration of contrast, helical CT tomographic images of the abdomen and pelvis were acquired. Coronal reformatted images were also provided for review. The study is degraded by motion artifact. FINDINGS: There is a trace right-sided pleural effusion blunting the costophrenic angle with right basilar atelectasis or scarring. Lung bases are otherwise clear. The base of the heart is unremarkable. Harjinder Reynoso LIVER: Liver is diminished in caliber with scalloped contour suggesting underlying cirrhotic change. No evidence of focal hepatic mass. There is normal enhancement of the hepatic vasculature. Harjinder Reynoso BILIARY SYSTEM: The gallbladder is unremarkable. No intrahepatic or extrahepatic ductal dilatation. PANCREAS: No focal pancreatic lesion. SPLEEN: There is splenomegaly with the spleen measuring 18.7 cm in eftk-ti-bdch length.  The spleen is homogeneous in density without evidence of focal mass. Niki Dye KIDNEYS AND ADRENALS: Bilateral kidneys and adrenal glands are unremarkable. The ureters are decompressed and normal in appearance. RETROPERITONEUM: There is heavy atheromatous calcification of the abdominal aorta as well as milder disease in the iliac arteries. There is no evidence of aneurysm. No evidence of retroperitoneal hematoma or lymphadenopathy. Niki Dye GI TRACT: There is a nonspecific bowel gas pattern with some fluid-filled bowel loops with scattered air-fluid levels. The colon is normal in distribution and appearance. There is no evidence of mechanical bowel obstruction or focal bowel wall thickening. . The appendix is visualized and unremarkable. OTHER: There is no mesenteric mass, lymphadenopathy or fluid collection. The abdominopelvic vasculature is patent. There is a small amount of free fluid within the pelvis suggesting mild ascites. This may be related to the patient's suspected hepatocellular disease. No suspicious bony abnormalities are demonstrated. . Small bilateral fat-containing inguinal hernias are present. PELVIS: There is no free fluid or mass within the pelvis. . The urinary bladder wall is prominent but the urinary bladder is nondistended. There is no free fluid in the pelvis. .    1. The liver is cirrhotic in appearance with mild heterogeneity and diminished caliber with scalloped contours. No discrete hepatic mass is identified. There is splenomegaly. A small amount of free fluid is noted within the lower abdomen and pelvis suggesting mild ascites. Portal hypertension should be considered. 2. There is nonspecific bowel gas pattern with some fluid-filled bowel loops with scattered air-fluid levels. This may represent a mild enteritis. The colon is normal in distribution and appearance. No evidence of mechanical obstruction. The appendix is identified and is normal in appearance. 3. Small bilateral fat-containing inguinal hernias. . 4. Trace right-sided pleural effusion with right basilar atelectasis or scarring. 5. Prominence of the urinary bladder wall. This may be related to incomplete distention. Signed by Dr Raegan Davenport    Result Date: 8/11/2021  EXAMINATION: Chest one view 11/20/2021 HISTORY: Shortness of air. FINDINGS: Today's exam is compared to previous study of 10/23/2020. There is mild scarring within the lung bases. Lungs are otherwise clear. There is no evidence of consolidative pneumonia or effusion. No evidence of pneumothorax. . Mild scarring in the lung bases. No acute disease. Signed by Dr Jeramy Higginbotham    Result Date: 8/11/2021  CTA PULMONARY W CONTRAST 8/11/2021 6:39 PM HISTORY: Worsening shortness of air. Chest pain and hypoxia COMPARISON: None. DLP: 779 mGy cm. All CT scans are performed using dose optimization techniques as appropriate to the performed exam and include at least one of the following: Automated exposure control, adjustment of the mA and/or kV according to size, and the use of the iterative reconstruction technique. TECHNIQUE: Helical tomographic images of the chest were obtained after the administration of intravenous contrast following angiogram protocol. Additionally, 3D MIP reconstructions in the coronal and sagittal planes were provided. FINDINGS:  Pulmonary arteries: There is adequate enhancement of the pulmonary arteries to evaluate for central and segmental pulmonary emboli. There are no filling defects within the main, lobar, segmental or visualized subsegmental pulmonary arteries. The pulmonary vessels are within normal limits. Aorta and great vessels: The aorta is well opacified and demonstrates no aneurysm, stenosis or dissection. The great vessels are normal in appearance. Moderate coronary calcifications are present. . Neck base: The imaged portion of the base of the neck appears unremarkable. Lungs:  There is mild scarring or atelectasis within the lingular segment left upper lobe and right middle lobe as well as the right posterior lung base. No evidence of acute consolidative pneumonia or effusion. . The trachea and bronchial tree are patent. Heart: The heart is normal in size. There is no pericardial effusion. Lymph nodes: No pathologically enlarged mediastinal, hilar, or axillary lymph nodes are present. Bones and soft tissues: The osseous structures of the thorax and surrounding soft tissues demonstrate no acute process. Upper abdomen: The liver is scalloped in contour and diminished in caliber suggesting there may be underlying cirrhotic change. The spleen is enlarged suggesting there could also be portal hypertension. The spleen is not imaged in its entirety. .     1. No evidence of pulmonary thromboembolic disease. No evidence of acute consolidative pneumonia. 2. There is some scarring within the right lung base as well as within the right middle lobe and lingular segment of the left upper lobe. No effusion is present. 3. Coronary calcifications are present. No evidence of cardiomegaly or pericardial effusion. 4. The liver is cirrhotic in appearance with scalloped contours and diminished caliber but not imaged in its entirety. There also is splenomegaly raising the possibility of portal hypertension. . Signed by Dr Silvino Hinson:   CBC:   Recent Labs     08/16/21 0348 08/17/21  0348 08/18/21  0422   WBC 3.5* 7.1 6.6   HGB 11.1* 11.8* 12.0*   * 86* 78*     BMP:    Recent Labs     08/16/21 0348 08/17/21  0348 08/18/21  0422   * 127* 133*   K 4.6 5.1* 4.6   CL 95* 95* 100   CO2 27 20* 23   BUN 27* 27* 30*   CREATININE 0.8 0.9 0.7   GLUCOSE 332* 426* 261*     INR: No results for input(s): INR in the last 72 hours. ABGs:No results for input(s): PH, PO2, PCO2, HCO3, BE, O2SAT in the last 72 hours. Lactic Acid:No results for input(s): LACTA in the last 72 hours.     Procedures: None  Hospital Course: 41-year-old obese male with a past medical history of alcoholic cirrhosis, COPD, diabetes, hypertension, atrial fibrillation on sotalol not on anticoagulation, Covid positive since 7/23/2021 presenting to the hospital for dyspnea found to be hypoxic admitted for further management. Tocilizumab given. Patient transitioned to 4th floor COVID and shortly after noted to have atrial fibrillation with RVR with rates into the 150s and associated chest pain with rapid response called and patient was transitioned back to CCU for Cardizem drip and cardiology consult. Fortunately patient converted spontaneously shortly after arrival to CCU and is remained in normal sinus rhythm. Transition back to routine medical floor for for Covid. Cardiology on board for intermittent runs of A. fib with RVR. Verapamil transitioned to Cardizem with cardiology. Patient has received full course of remdesivir while inpatient. Patient has been screened for home oxygen which she does not require. Home health has been ordered for the patient. Patient is currently hemodynamically safe for discharge home today to follow-up with PCP and cardiology as an outpatient. Patient would benefit from outpatient lung imaging after resolution of COVID-19. Patient has been counseled regarding need for dietary and lifestyle modification with a goal of weight loss and improvement in blood sugars. Patient will be prescribed 1 month of Eliquis 2.5 mg p.o. twice daily as prophylaxis.     Physical Exam:  Vitals: /61   Pulse 55   Temp 97.4 °F (36.3 °C) (Temporal)   Resp 20   Ht 5' 9\" (1.753 m)   Wt 247 lb 2 oz (112.1 kg)   SpO2 97%   BMI 36.49 kg/m²   24HR INTAKE/OUTPUT:      Intake/Output Summary (Last 24 hours) at 8/18/2021 1021  Last data filed at 8/18/2021 8478  Gross per 24 hour   Intake 720 ml   Output 3325 ml   Net -2605 ml     General appearance: Alert, obese  HEENT: AT/NC  Lungs: BLAE  Heart: RRR  Abdomen: BS+, obese  Extremities: pulses+  Neurologic: Alert, gross motor function intact  Skin: warm     Discharge Medications:       Caffie Smoke   Home Medication Instructions NTR:[id number]    Printed on:08/18/21 1021   Medication Information                      albuterol sulfate (PROAIR RESPICLICK) 934 (90 Base) MCG/ACT aerosol powder inhalation  Inhale 2 puffs into the lungs every 4 hours as needed for Wheezing or Shortness of Breath             apixaban (ELIQUIS) 2.5 MG TABS tablet  Take 1 tablet by mouth 2 times daily             butalbital-acetaminophen-caffeine (FIORICET, ESGIC) -40 MG per tablet  Take 1 tablet by mouth every 6 hours as needed for Headaches             celecoxib (CELEBREX) 200 MG capsule  Take 1 capsule by mouth daily             diclofenac sodium (VOLTAREN) 1 % GEL  Apply 4 g topically 2 times daily             dilTIAZem (CARDIZEM CD) 180 MG extended release capsule  Take 1 capsule by mouth daily             Elastic Bandages & Supports (MEDICAL COMPRESSION STOCKINGS) MISC  1 each by Does not apply route daily 20-30 mmHg  Sized to fit             Fluticasone-Umeclidin-Vilant (TRELEGY ELLIPTA IN)  Inhale 1 puff into the lungs daily             folic acid (FOLVITE) 1 MG tablet  Take 1 tablet by mouth daily             furosemide (LASIX) 20 MG tablet  Take 1 tablet by mouth daily             hydrALAZINE (APRESOLINE) 50 MG tablet  Take 1 tablet by mouth every 8 hours             isosorbide mononitrate (IMDUR) 60 MG extended release tablet  Take 1 tablet by mouth daily             linaCLOtide (LINZESS) 72 MCG CAPS capsule  Take 1 capsule by mouth every morning (before breakfast) Indications: gets samples             nitroGLYCERIN (NITROSTAT) 0.4 MG SL tablet  Place 1 tablet under the tongue every 5 minutes as needed for Chest pain up to max of 3 total doses. If no relief after 1 dose, call 911.              omeprazole (PRILOSEC) 40 MG delayed release capsule  Take 1 capsule by mouth daily             ondansetron (ZOFRAN-ODT) 4 MG

## 2021-08-18 NOTE — CARE COORDINATION
Eliquis coupon has been provided for pt in chart if needed at [facility].    Electronically signed by Wm Moss on 8/18/2021 at 10:49 AM
Informed by Jaz Paez  ept is concerned about pets at home while pt is admitted. SW contacted the [employer] who reports can get animals from the home and possibly have foster placement while  ept is admitted. EVA attempted to contact the pt on his cell but received VM; left the number for animal control so the pt can call to inform number and breed of animals as well as give consent to get the animals from the home.
SW informed that pt has exhausted all possible transportation rides. Spoke with pt, he stated that he has nobody that can come pick him. Inquired if pt has money to cover cab, he stated that he does not. Called [facility]  Mileage: 32  Fare Amount: $54     PMF voucher provided for pt.    Electronically signed by Nghia Stroud on 8/18/2021 at 3:12 PM
Spoke with patient regarding MD orders for [place] services. Patient agreeable and has chosen [facility]. Referral Faxed. [address] [place] [address] [phone]. [phone]. Please notify [facility] when patient discharges and fax DC Summary,  DC med list and any new [place] orders. The Patient was provided with a choice of provider and agrees   with the discharge plan. [x] Yes [] No    Freedom of choice list was provided with basic dialogue that supports the patient's individualized plan of care/goals, treatment preferences and shares the quality data associated with the providers.  [x] Yes [] No  Electronically signed by Chris Flower on 8/16/2021 at 11:11 AM
OXYGEN prior to admission:  None pta but possible upon [facility])  Ph: [phone]  Fa: [phone]  Informed of need to bring portable home O2 tank to hospital on day of DISCHARGE:  At home medical  Name of person committed to bringing portable tank at discharge:  supplier    Active with HD/PD prior to admission: none  Nephrologist:  Daniela 167:  n/a    Transition Plan:  2L NC on 8/12; pta home- alone    Transportation PLAN for Discharge:  Reports a friend will be assisting with dc transport     Barriers to discharge:  Establishing home O2 if needed, provide eliquis coupon for free 30day supply at dc. Additional CM/SW Notes: Pt contacted SW to thank for assisting with pets that are left in the home. Pt states able to contact [employer] to get his pets from the home and find a placement for them until the pt is dc'd. SW stated would be following the pt until dc and if needed further assistance could contact SW again. No SNF services necessary at this time.       [address]  Care Management Department  Ph:  [phone]   Fax: [phone]

## 2021-08-18 NOTE — PROGRESS NOTES
Physical Therapy    Name: Luisa Ceron  MRN: [id number]  Date of Service:  8/18/2021 08/18/21 0255   Subjective   Subjective Attempt: Via RN- pt is waiting on cab to be d/c and does not want to participate in therapy.             Electronically signed by Catherine Dejesus PTA on 8/18/2021 at 4:15 PM

## 2021-08-19 ENCOUNTER — CARE COORDINATION (OUTPATIENT)
Dept: CASE MANAGEMENT | Age: 61
End: 2021-08-19

## 2021-08-19 ENCOUNTER — TELEPHONE (OUTPATIENT)
Dept: ADMINISTRATIVE | Age: 61
End: 2021-08-19

## 2021-08-19 DIAGNOSIS — J96.01 ACUTE HYPOXEMIC RESPIRATORY FAILURE DUE TO SEVERE ACUTE RESPIRATORY SYNDROME CORONAVIRUS 2 (SARS-COV-2) DISEASE (HCC): Primary | ICD-10-CM

## 2021-08-19 DIAGNOSIS — U07.1 ACUTE HYPOXEMIC RESPIRATORY FAILURE DUE TO SEVERE ACUTE RESPIRATORY SYNDROME CORONAVIRUS 2 (SARS-COV-2) DISEASE (HCC): Primary | ICD-10-CM

## 2021-08-19 PROCEDURE — 1111F DSCHRG MED/CURRENT MED MERGE: CPT | Performed by: FAMILY MEDICINE

## 2021-08-19 NOTE — TELEPHONE ENCOUNTER
Spoke with Yessica with [facility] [phone] and she is headed to admit him to home care now. She will go over all his medications and try to answer any questions he has and will get back with me, if I need to do or address anything.

## 2021-08-19 NOTE — TELEPHONE ENCOUNTER
Pt called distressed because he has many new Prescriptions and he said he has a lot of questions and he cannot wait until the Monday appt to discuss these, he requests that someone call him ASAP, his phone is also running out and his  was left at hospital, please call when available.

## 2021-08-19 NOTE — CARE COORDINATION
Sven [facility] Transitions Initial Follow Up Call    Call within 2 business days of discharge: Yes    Patient: Rachana Street Patient : 1960   MRN: [id number]  Reason for Admission: Acute Hypoxic Respiratory Failure secondary to COVI D 19  Discharge Date: 21 RARS: Readmission Risk Score: 17      Last Discharge [facility]       Complaint Diagnosis Description Type Department Provider    21 Shortness of Breath Acute respiratory failure with hypoxia ([address] . .. ED to Hosp-Admission (Discharged) (ADMITTED) [facility] Hira Valenzuela MD; VITO Gramajo. Spoke with: [address]: [facility]      Non-face-to-face services provided:  Reviewed encounter information for continuity of care prior to follow up Care Transitions phone call - chart notes, consults, progress notes, test results, med list, appointments, AVS, other information. Care Transitions 24 Hour Call    Schedule Follow Up Appointment with PCP: Declined  Do you have any ongoing symptoms?: No  Do you have a copy of your discharge instructions?: Yes  Do you have all of your prescriptions and are they filled?: Yes  Have you been contacted by a [address]?: No  Have you scheduled your follow up appointment?: No  Were you discharged with any Home Care or Post Acute Services: No  Do you feel like you have everything you need to keep you well at home?: Yes  Care Transitions Interventions       Placed a call to the number listed for patient and spoke with him for an initial follow up call for Care Transitions Coordination following discharge from the hospital.  He reported that he is doing fair. He said he has recovered from most of his symptoms. He said that he does not require oxygen and his pulse ox readings are running from 96-98% most times when he checks it. He said that he is not having any shortness of breath, cough, congestion, sore throat, ear ache, fever or other symptoms.   He said he is eating well, drinking well. He said he has never been told that he is diabetic, but that he was running a little high while in the hospital and they think it was because of steroids. He said he has never had a Hgb A1C that he is aware of or been told about. I did look back and saw that he had one on the 16th, results 8.2. Did discuss normal range and need for follow up after he has recovered with PCP to have it rechecked. He voiced understanding. He did do a med review. Ursula Hines he has everything and is taking it as ordered. Is aware of what to stop from previous meds. He said he has not heard from home health yet. He is aware that PCP will be calling with an appointment for follow up. He said that he biggest concern at this time is that he has little energy, stamina. Discussed staying active, but listening to his body and resting when needed. He said he has been sick since before 7/23, which is when he was first tested positive. He is ready to be recovered, but is aware that his previous medical issues will have a factor in his recovery. Discussed nutritional and fluid needs. Discussed what to report to CTN or PCP. Discussed COVID 19 information, risks, signs, quarantine, infection control, vaccines, etc.  Patient aware and voices understanding. Informed of CTC process, purpose, future calls, etc and is agreeable with appropriate follow up. Ensured to have CTN contact information to be used as needed. Encouraged to call as needed for new issues, questions, etc.  Given the opportunity to ask questions and answered appropriately. CTN to follow up as indicated. Transitions of Care Initial Call    Was this an external facility discharge? No    Challenges to be reviewed by the provider   Additional needs identified to be addressed with provider: No       Method of communication with provider : none    Advance Care Planning:   Does patient have an Advance Directive: not on file.      Advance Care Planning   Healthcare Decision Maker:    Primary Decision Maker: Filiberto Reynolds - Brother/Sister - [phone]    Was this a readmission? No  Patient stated reason for admission: \"I just was not getting any better. \"  Patients top risk factors for readmission: functional physical ability, medical condition-COPD, A Fib, HTN, DM, Covid 19 positive diagnosis and medication management    Care Transition Nurse (CTN) contacted the patient by telephone to perform post hospital discharge assessment. Verified name and  with patient as identifiers. Provided introduction to self, and explanation of the CTN role. CTN reviewed discharge instructions, medical action plan and red flags with patient who verbalized understanding. Patient given an opportunity to ask questions and does not have any further questions or concerns at this time. Were discharge instructions available to patient? Yes. Reviewed appropriate site of care based on symptoms and resources available to patient including: PCP, Urgent care clinics, Home health, When to call 911 and [address]. The patient agrees to contact the PCP office for questions related to their healthcare. Medication reconciliation was performed with patient, who verbalizes understanding of administration of home medications. Advised obtaining a 90-day supply of all daily and as-needed medications. Covid Risk Education     Educated patient about risk for severe COVID-19 due to risk factors according to CDC guidelines. CTN reviewed discharge instructions, medical action plan and red flag symptoms with the patient who verbalized understanding. Discussed COVID vaccination status: Yes. Education provided on COVID-19 vaccination as appropriate. Discussed exposure protocols and quarantine with CDC Guidelines.  Patient was given an opportunity to verbalize any questions and concerns and agrees to contact CTN or health care provider for questions related to their healthcare. Reviewed and educated patient on any new and changed medications related to discharge diagnosis. Was patient discharged with a pulse oximeter? Yes Discussed and confirmed pulse oximeter discharge instructions and when to notify provider or seek emergency care. CTN provided contact information. Plan for follow-up call in 5-7 days based on severity of symptoms and risk factors.   Plan for next call: - disease process mgmt, symptom mgmt, diet/hydration, pain control needs, medication mgmt, activity level, home safety needs, infection control, fall precautions, seeking medical attention, who/when to call prn any needs, etc.            Follow Up  Future Appointments   Date Time Provider Olu Jo   8/23/2021 10:15 AM Dain Spears MD [facility]   9/20/2021  2:30 PM Khushi Charles MD [facility]       Haley Webb RN

## 2021-08-23 ENCOUNTER — TELEPHONE (OUTPATIENT)
Dept: ADMINISTRATIVE | Age: 61
End: 2021-08-23

## 2021-08-23 NOTE — TELEPHONE ENCOUNTER
Patient called in stating that he has covid for over a month and spend  8 days in the hospital    Patient stating that he still has the following smyptoms:  - loss of taste and smell  -headaches  nausea  -Weakness    Patient is requesting call back at [phone]

## 2021-08-24 ENCOUNTER — VIRTUAL VISIT (OUTPATIENT)
Dept: FAMILY MEDICINE CLINIC | Age: 61
End: 2021-08-24
Payer: MEDICARE

## 2021-08-24 ENCOUNTER — CARE COORDINATION (OUTPATIENT)
Dept: CASE MANAGEMENT | Age: 61
End: 2021-08-24

## 2021-08-24 ENCOUNTER — TELEPHONE (OUTPATIENT)
Dept: FAMILY MEDICINE CLINIC | Age: 61
End: 2021-08-24

## 2021-08-24 DIAGNOSIS — I48.91 ATRIAL FIBRILLATION, UNSPECIFIED TYPE (HCC): ICD-10-CM

## 2021-08-24 DIAGNOSIS — K13.79 MOUTH SORES: Primary | ICD-10-CM

## 2021-08-24 DIAGNOSIS — G43.909 MIGRAINE WITHOUT STATUS MIGRAINOSUS, NOT INTRACTABLE, UNSPECIFIED MIGRAINE TYPE: ICD-10-CM

## 2021-08-24 DIAGNOSIS — I25.10 CORONARY ARTERY DISEASE INVOLVING NATIVE HEART WITHOUT ANGINA PECTORIS, UNSPECIFIED VESSEL OR LESION TYPE: ICD-10-CM

## 2021-08-24 DIAGNOSIS — U09.9 LONG COVID: Primary | ICD-10-CM

## 2021-08-24 PROCEDURE — 99443 PR PHYS/QHP TELEPHONE EVALUATION 21-30 MIN: CPT | Performed by: FAMILY MEDICINE

## 2021-08-24 RX ORDER — DILTIAZEM HYDROCHLORIDE 180 MG/1
180 CAPSULE, COATED, EXTENDED RELEASE ORAL DAILY
Qty: 30 CAPSULE | Refills: 5 | Status: SHIPPED | OUTPATIENT
Start: 2021-08-24

## 2021-08-24 RX ORDER — ISOSORBIDE MONONITRATE 60 MG/1
60 TABLET, EXTENDED RELEASE ORAL DAILY
Qty: 30 TABLET | Refills: 0 | Status: SHIPPED | OUTPATIENT
Start: 2021-08-24

## 2021-08-24 RX ORDER — LIDOCAINE HYDROCHLORIDE 20 MG/ML
15 SOLUTION OROPHARYNGEAL
Qty: 100 ML | Refills: 0 | Status: SHIPPED | OUTPATIENT
Start: 2021-08-24

## 2021-08-24 RX ORDER — BUTALBITAL, ACETAMINOPHEN AND CAFFEINE 50; 325; 40 MG/1; MG/1; MG/1
1 TABLET ORAL EVERY 6 HOURS PRN
Qty: 15 TABLET | Refills: 0 | Status: SHIPPED | OUTPATIENT
Start: 2021-08-24 | End: 2021-09-14 | Stop reason: SDUPTHER

## 2021-08-24 NOTE — CARE COORDINATION
Sven [facility] Transitions Follow Up Call    2021    Patient: Caryle Hauser  Patient : 1960   MRN: [id number]    Discharge Date: 21 RARS: Readmission Risk Score: [address] Transitions Subsequent and Final Call    Subsequent and Final Calls  Have your medications changed?: No  Do you have any questions related to your medications?: No  Do you currently have any active services?: No  Do you have any needs or concerns that I can assist you with?: No  Identified Barriers: None  Care Transitions Interventions  Other Interventions:         Placed a call to the home and spoke with patient. He reported that he has not had a good week. He said not bad, just not better. He said that he is still very weak, not gaining much strength. He is eating well, appetite is good. His concern though is that he is having to eat a lot of microwave meals because he doesn't have the strength to cook as yet. He said some friends are bringing a home cooked meal occasionally and they helps greatly. He said that he is concerned about the sodium in the preservatives in the other meals and tries to limit it. He said he is not coughing much or really having any respiratory issues. He said that he is just tired, weak. He can't do much as far as chores, etc.  He does not feel that he is sick enough to go to the hospital or be seen by the doctor. HE realizes it is just going to take time. TO call prn any needs. CTN to follow up in a few days.      Follow Up  Future Appointments   Date Time Provider Olu Jo   2021  2:30 PM Cherelle David MD N [facility] [place]       Nancy Avina, STEFANIE

## 2021-08-24 NOTE — PROGRESS NOTES
Naheed Leung is a 59 y.o. male evaluated via telephone on 8/24/2021. Consent:  He and/or health care decision maker is aware that that he may receive a bill for this telephone service, depending on his insurance coverage, and has provided verbal consent to proceed: Yes      Documentation:  I communicated with the patient and/or health care decision maker about here for follow-up of hospital stay. Patient is a 56-year-old white male. He was admitted to [facility]) secondary to COVID-19. Fortunately he did not develop severe disease but he developed some issues with atrial fibrillation. He was switched from verapamil to Cardizem. He also was given low-dose Eliquis. Reason for Eliquis use appears to be mainly DVT prophylaxis but he may benefit from long-term anticoagulation therapy secondary to his A. fib. Main issue I have with that is the fact that he have history of varices. He said that he has some black stools. Denies any other symptoms. He said that he feels better but still having headaches frequently. Fioricet seems to help. He has not required any oxygen. He had multiple complaints about his treatment mainly with the cleanness of the hospital.   Details of this discussion including any medical advice provided: Saskia Farr. Refill Fioricet. Record from [facility] visit were reviewed with him today. We will continue same treatment plan. Medications were reviewed. Refills were sent to the pharmacy. We will do blood work to monitor hemoglobin. Discontinue Eliquis if blood problem worsen. He will also see cardiology in near future at [facility]. Follow-up in 4 weeks      I affirm this is a Patient Initiated Episode with a Patient who has not had a related appointment within my department in the past 7 days or scheduled within the next 24 hours.     Patient identification was verified at the start of the visit: Yes    Total Time: minutes: 21-30 minutes    The visit was conducted pursuant to the emergency declaration under the [facility], [address] [place] [address] [facility] authority and the [employer] General Act. Patient identification was verified, and a caregiver was present when appropriate. The patient was located in a state where the provider was credentialed to provide care.     Note: not billable if this call serves to triage the patient into an appointment for the relevant concern      Irma Brown MD

## 2021-08-24 NOTE — TELEPHONE ENCOUNTER
Patient called stating he forgot to ask for something for the sores in his mouth. He said they hurt really bad and wanted to know if you could get him something to help with those. Please advise.

## 2021-08-30 ENCOUNTER — CARE COORDINATION (OUTPATIENT)
Dept: CASE MANAGEMENT | Age: 61
End: 2021-08-30

## 2021-08-30 NOTE — CARE COORDINATION
Sven [facility] Transitions Follow Up Call    2021    Patient: Rachana Street  Patient : 1960   MRN: [id number]    Discharge Date: 21 RARS: Readmission Risk Score: 16         Spoke with: N/A    Care Transitions Subsequent and Final Call    Subsequent and Final Calls  Care Transitions Interventions  Other Interventions:         Attempted to make contact with patient/caregiver for a routine follow up call without success. Unable to leave a HIPAA compliant message regarding intent of call and call back information. Call went to an unidentifiable voice messaging sytem (mobile voice mail or telephone answering machine). Will try again at a later time.          Follow Up  Future Appointments   Date Time Provider Olu Jo   2021  2:30 PM Benoit Joaquin MD N [facility] [place]       Governor STEFANIE [place]

## 2021-08-31 PROCEDURE — 93227 XTRNL ECG REC<48 HR R&I: CPT | Performed by: INTERNAL MEDICINE

## 2021-08-31 NOTE — PROCEDURES
Cardiac event monitor report    Dates of recording 8/18/2021 through 8/18/2021    Summary impressions:    1. Sinus rhythm minimal heart rate 59 average 75 maximal 109    2. 2 episode supraventricular tachycardia fastest interval 14 beats at a rate of 154 longest 12 beats an average rate of 92    3. No episodes of ventricular tachycardia were recorded    4. No pauses greater than 3.0 seconds were recorded    5. No episodes of complete or Mobitz 2 atrioventricular block were recorded    6. No episodes of atrial fibrillation were recorded    7. No triggered or diary events were recorded    8.  Rare ectopy otherwise noted

## 2021-09-03 ENCOUNTER — CARE COORDINATION (OUTPATIENT)
Dept: CASE MANAGEMENT | Age: 61
End: 2021-09-03

## 2021-09-03 NOTE — CARE COORDINATION
Sven [facility] Transitions Follow Up Call    9/3/2021    Patient: Pastor Godfrey  Patient : 1960   MRN: [id number]  Reason for Admission: COVID 19   Discharge Date: 21 RARS: Readmission Risk Score: 16         Spoke with: Ismael 51 Transitions Subsequent and Final Call    Subsequent and Final Calls  Have your medications changed?: No  Do you have any questions related to your medications?: No  Do you currently have any active services?: No  Do you have any needs or concerns that I can assist you with?: No  Identified Barriers: None  Care Transitions Interventions  Other Interventions:         Placed a call to the patient for follow up. He reported that he is having a horrible day. He was only answering questions with yes or no. I asked if he was having trouble breathing today. He said yes. I asked if it is worse. He said yes. I asked he if felt like he needed to come to the ED. He said no. He then said that his pulse ox was running about 96%. I asked if he had oxygen. He said yes. I asked if he was wearing it all the time. He said no. I instructed him to wear it all the time if he needs to for comfort. He said ok. I asked if he had cough, congested, fever, other symptoms. He said no. He said he was having a lot of pain and couldn't get any medication. I asked again if he felt like he needed to come to ED or to be seen by his PCP. I offered to call to see if we can get him worked in for an appointment today. He said he is on vacation. I said maybe one of his partners could see him. He then told me that he is in an assisted living. I asked which one. He said [facility] after being told by someone in the background. [facility] is a SNF. I said to him that he has nurses there with him to check on him regularly. He said yes. He said again that he can't get any more medicine. I asked what medicine needed. He said pain medicine.   I asked what kind of pain he was having. He did not say. He just said that he does not want to come back to ED and he doesn't want to be seen by one of his PCP's partners. He said he will be alright and not to worry. Encouraged him to come to ED if he feels like he needs to, but told him that I was relieved to know that he is at the SNF and has someone to watch over him. Will follow up in a few days.      Follow Up  Future Appointments   Date Time Provider Olu Jo   9/20/2021  2:30 PM aSntana Reyes MD N [facility] [place]       Allean Cogan, STEFANIE

## 2021-09-08 ENCOUNTER — CARE COORDINATION (OUTPATIENT)
Dept: CASE MANAGEMENT | Age: 61
End: 2021-09-08

## 2021-09-08 NOTE — CARE COORDINATION
[facility] Transitions Follow Up Call    2021    Patient: Gabriele Kebede  Patient : 1960   MRN: [id number]   Discharge Date: 21 RARS: Readmission Risk Score: 17         Spoke with: Ismael 51 Transitions Subsequent and Final Call    Subsequent and Final Calls  Care Transitions Interventions  Other Interventions:         Placed a call to patient to check on his status. He said he is not having a good day. I asked what was wrong and he said that he just is having a bad day. I asked if he is at [facility] and he said no, [facility]. He said that he went in with A Fib. He sounded quite weak. Did not keep him. Told him to call if he needed anything that I could help him. He voiced understanding. Will disengage at this time, as patient is in a local hospital, acute care.      Follow Up  Future Appointments   Date Time Provider Olu Jo   2021  2:30 PM Steven Lindsay MD N [facility] [place]       Zachariah Arita RN

## 2021-09-14 DIAGNOSIS — G43.909 MIGRAINE WITHOUT STATUS MIGRAINOSUS, NOT INTRACTABLE, UNSPECIFIED MIGRAINE TYPE: ICD-10-CM

## 2021-09-14 RX ORDER — BUTALBITAL, ACETAMINOPHEN AND CAFFEINE 50; 325; 40 MG/1; MG/1; MG/1
1 TABLET ORAL EVERY 4 HOURS PRN
Qty: 120 TABLET | Refills: 2 | Status: SHIPPED | OUTPATIENT
Start: 2021-09-14

## 2021-09-15 DIAGNOSIS — M54.9 CHRONIC BACK PAIN, UNSPECIFIED BACK LOCATION, UNSPECIFIED BACK PAIN LATERALITY: Primary | ICD-10-CM

## 2021-09-15 DIAGNOSIS — G47.00 INSOMNIA, UNSPECIFIED TYPE: ICD-10-CM

## 2021-09-15 DIAGNOSIS — G89.29 CHRONIC BACK PAIN, UNSPECIFIED BACK LOCATION, UNSPECIFIED BACK PAIN LATERALITY: Primary | ICD-10-CM

## 2021-09-15 RX ORDER — ZOLPIDEM TARTRATE 5 MG/1
5 TABLET ORAL NIGHTLY
Qty: 30 TABLET | Refills: 2 | Status: SHIPPED | OUTPATIENT
Start: 2021-09-15 | End: 2021-09-21

## 2021-09-15 RX ORDER — TRAMADOL HYDROCHLORIDE 50 MG/1
50 TABLET ORAL EVERY 6 HOURS
Qty: 120 TABLET | Refills: 2 | Status: SHIPPED | OUTPATIENT
Start: 2021-09-15 | End: 2021-10-15

## 2021-09-21 DIAGNOSIS — G47.00 INSOMNIA, UNSPECIFIED TYPE: ICD-10-CM

## 2021-09-21 RX ORDER — ZOLPIDEM TARTRATE 10 MG/1
10 TABLET ORAL NIGHTLY
Qty: 30 TABLET | Refills: 2 | Status: SHIPPED | OUTPATIENT
Start: 2021-09-21 | End: 2021-10-21

## 2022-02-21 ENCOUNTER — HOSPITAL ENCOUNTER (OUTPATIENT)
Dept: MRI IMAGING | Age: 62
Discharge: HOME | End: 2022-02-21
Payer: MEDICARE

## 2022-02-21 DIAGNOSIS — M54.16 LUMBAR RADICULOPATHY: ICD-10-CM

## 2022-02-21 PROCEDURE — 72148 MRI LUMBAR SPINE W/O DYE: CPT

## 2025-01-27 NOTE — PROGRESS NOTES
Pt has requested pain meds. Several times. He wants more then what he gets at home, states home meds does not work. Pt has appt with pain management on the 10/28. tgreeno rn No